# Patient Record
Sex: FEMALE | Race: WHITE | URBAN - METROPOLITAN AREA
[De-identification: names, ages, dates, MRNs, and addresses within clinical notes are randomized per-mention and may not be internally consistent; named-entity substitution may affect disease eponyms.]

---

## 2017-05-11 ENCOUNTER — HISTORICAL (OUTPATIENT)
Dept: ADMINISTRATIVE | Facility: HOSPITAL | Age: 57
End: 2017-05-11

## 2017-05-11 LAB
ABS NEUT (OLG): 1.52 X10(3)/MCL (ref 2.1–9.2)
ALBUMIN SERPL-MCNC: 3.7 GM/DL (ref 3.4–5)
ALBUMIN/GLOB SERPL: 1.2 {RATIO}
ALP SERPL-CCNC: 121 UNIT/L (ref 38–126)
ALT SERPL-CCNC: 35 UNIT/L (ref 12–78)
AST SERPL-CCNC: 34 UNIT/L (ref 15–37)
BASOPHILS # BLD AUTO: 0 X10(3)/MCL (ref 0–0.2)
BASOPHILS NFR BLD AUTO: 0.4 %
BILIRUB SERPL-MCNC: 0.4 MG/DL (ref 0.2–1)
BILIRUBIN DIRECT+TOT PNL SERPL-MCNC: 0.1 MG/DL (ref 0–0.2)
BILIRUBIN DIRECT+TOT PNL SERPL-MCNC: 0.3 MG/DL (ref 0–0.8)
BUN SERPL-MCNC: 21 MG/DL (ref 7–18)
CALCIUM SERPL-MCNC: 8.8 MG/DL (ref 8.5–10.1)
CHLORIDE SERPL-SCNC: 105 MMOL/L (ref 98–107)
CO2 SERPL-SCNC: 28 MMOL/L (ref 21–32)
CREAT SERPL-MCNC: 0.99 MG/DL (ref 0.55–1.02)
EOSINOPHIL # BLD AUTO: 0.1 X10(3)/MCL (ref 0–0.9)
EOSINOPHIL NFR BLD AUTO: 3.7 %
ERYTHROCYTE [DISTWIDTH] IN BLOOD BY AUTOMATED COUNT: 14.4 % (ref 11.5–17)
FERRITIN SERPL-MCNC: 66.3 NG/ML (ref 8–388)
GLOBULIN SER-MCNC: 3.2 GM/DL (ref 2.4–3.5)
GLUCOSE SERPL-MCNC: 95 MG/DL (ref 74–106)
HCT VFR BLD AUTO: 35.1 % (ref 37–47)
HGB BLD-MCNC: 11.4 GM/DL (ref 12–16)
IRON SATN MFR SERPL: 14.8 % (ref 20–50)
IRON SERPL-MCNC: 50 MCG/DL (ref 50–175)
LYMPHOCYTES # BLD AUTO: 0.8 X10(3)/MCL (ref 0.6–4.6)
LYMPHOCYTES NFR BLD AUTO: 30.2 %
MCH RBC QN AUTO: 27.7 PG (ref 27–31)
MCHC RBC AUTO-ENTMCNC: 32.5 GM/DL (ref 33–36)
MCV RBC AUTO: 85.4 FL (ref 80–94)
MONOCYTES # BLD AUTO: 0.2 X10(3)/MCL (ref 0.1–1.3)
MONOCYTES NFR BLD AUTO: 9 %
NEUTROPHILS # BLD AUTO: 1.5 X10(3)/MCL (ref 2.1–9.2)
NEUTROPHILS NFR BLD AUTO: 56.7 %
PLATELET # BLD AUTO: 73 X10(3)/MCL (ref 130–400)
PMV BLD AUTO: 10.8 FL (ref 9.4–12.4)
POTASSIUM SERPL-SCNC: 4.4 MMOL/L (ref 3.5–5.1)
PROT SERPL-MCNC: 6.9 GM/DL (ref 6.4–8.2)
RBC # BLD AUTO: 4.11 X10(6)/MCL (ref 4.2–5.4)
SODIUM SERPL-SCNC: 139 MMOL/L (ref 136–145)
TIBC SERPL-MCNC: 338 MCG/DL (ref 250–450)
TRANSFERRIN SERPL-MCNC: 257 MG/DL (ref 200–360)
WBC # SPEC AUTO: 2.7 X10(3)/MCL (ref 4.5–11.5)

## 2017-06-30 ENCOUNTER — HISTORICAL (OUTPATIENT)
Dept: ADMINISTRATIVE | Facility: HOSPITAL | Age: 57
End: 2017-06-30

## 2017-06-30 LAB
ABS NEUT (OLG): 1.38 X10(3)/MCL (ref 2.1–9.2)
ALBUMIN SERPL-MCNC: 4.1 GM/DL (ref 3.4–5)
ALBUMIN/GLOB SERPL: 1.3 {RATIO}
ALP SERPL-CCNC: 131 UNIT/L (ref 38–126)
ALT SERPL-CCNC: 43 UNIT/L (ref 12–78)
AST SERPL-CCNC: 27 UNIT/L (ref 15–37)
BASOPHILS # BLD AUTO: 0 X10(3)/MCL (ref 0–0.2)
BASOPHILS NFR BLD AUTO: 0.5 %
BILIRUB SERPL-MCNC: 0.5 MG/DL (ref 0.2–1)
BILIRUBIN DIRECT+TOT PNL SERPL-MCNC: 0.1 MG/DL (ref 0–0.2)
BILIRUBIN DIRECT+TOT PNL SERPL-MCNC: 0.4 MG/DL (ref 0–0.8)
BUN SERPL-MCNC: 25 MG/DL (ref 7–18)
CALCIUM SERPL-MCNC: 9.3 MG/DL (ref 8.5–10.1)
CHLORIDE SERPL-SCNC: 102 MMOL/L (ref 98–107)
CO2 SERPL-SCNC: 22 MMOL/L (ref 21–32)
CREAT SERPL-MCNC: 1.12 MG/DL (ref 0.55–1.02)
EOSINOPHIL # BLD AUTO: 0.1 X10(3)/MCL (ref 0–0.9)
EOSINOPHIL NFR BLD AUTO: 4.1 %
EOSINOPHIL NFR BLD MANUAL: 4 % (ref 0–8)
ERYTHROCYTE [DISTWIDTH] IN BLOOD BY AUTOMATED COUNT: 14.7 % (ref 11.5–17)
FERRITIN SERPL-MCNC: 503.6 NG/ML (ref 8–388)
GLOBULIN SER-MCNC: 3.2 GM/DL (ref 2.4–3.5)
GLUCOSE SERPL-MCNC: 258 MG/DL (ref 74–106)
HCT VFR BLD AUTO: 39 % (ref 37–47)
HGB BLD-MCNC: 13 GM/DL (ref 12–16)
IRON SATN MFR SERPL: 26.2 % (ref 20–50)
IRON SERPL-MCNC: 84 MCG/DL (ref 50–175)
LYMPHOCYTES # BLD AUTO: 0.5 X10(3)/MCL (ref 0.6–4.6)
LYMPHOCYTES NFR BLD AUTO: 24.2 %
LYMPHOCYTES NFR BLD MANUAL: 2 %
LYMPHOCYTES NFR BLD MANUAL: 30 % (ref 13–40)
MCH RBC QN AUTO: 29.7 PG (ref 27–31)
MCHC RBC AUTO-ENTMCNC: 33.3 GM/DL (ref 33–36)
MCV RBC AUTO: 89.2 FL (ref 80–94)
MONOCYTES # BLD AUTO: 0.2 X10(3)/MCL (ref 0.1–1.3)
MONOCYTES NFR BLD AUTO: 8.2 %
MONOCYTES NFR BLD MANUAL: 10 % (ref 2–11)
NEUTROPHILS # BLD AUTO: 1.4 X10(3)/MCL (ref 2.1–9.2)
NEUTROPHILS NFR BLD AUTO: 63 %
NEUTROPHILS NFR BLD MANUAL: 56 % (ref 47–80)
PLATELET # BLD AUTO: 50 X10(3)/MCL (ref 130–400)
PLATELET # BLD EST: ABNORMAL 10*3/UL
PMV BLD AUTO: 10.7 FL (ref 9.4–12.4)
POTASSIUM SERPL-SCNC: 4.7 MMOL/L (ref 3.5–5.1)
PROT SERPL-MCNC: 7.3 GM/DL (ref 6.4–8.2)
RBC # BLD AUTO: 4.37 X10(6)/MCL (ref 4.2–5.4)
RBC MORPH BLD: NORMAL
SODIUM SERPL-SCNC: 135 MMOL/L (ref 136–145)
TIBC SERPL-MCNC: 320 MCG/DL (ref 250–450)
TRANSFERRIN SERPL-MCNC: 265 MG/DL (ref 200–360)
WBC # SPEC AUTO: 2.2 X10(3)/MCL (ref 4.5–11.5)

## 2017-07-19 ENCOUNTER — HISTORICAL (OUTPATIENT)
Dept: ADMINISTRATIVE | Facility: HOSPITAL | Age: 57
End: 2017-07-19

## 2017-07-19 LAB
ERYTHROCYTE [DISTWIDTH] IN BLOOD BY AUTOMATED COUNT: 13.4 % (ref 11.5–14.5)
HCT VFR BLD AUTO: 37.5 % (ref 35–46)
HGB BLD-MCNC: 12.7 GM/DL (ref 12–16)
MCH RBC QN AUTO: 29.9 PG (ref 26–34)
MCHC RBC AUTO-ENTMCNC: 33.9 GM/DL (ref 31–37)
MCV RBC AUTO: 88.2 FL (ref 80–100)
PLATELET # BLD AUTO: 53 X10(3)/MCL (ref 130–400)
PMV BLD AUTO: 11.2 FL (ref 7.4–10.4)
RBC # BLD AUTO: 4.25 X10(6)/MCL (ref 4–5.2)
WBC # SPEC AUTO: 2.4 X10(3)/MCL (ref 4.5–11)

## 2017-07-24 ENCOUNTER — HISTORICAL (OUTPATIENT)
Dept: ADMINISTRATIVE | Facility: HOSPITAL | Age: 57
End: 2017-07-24

## 2017-07-24 LAB
ALBUMIN SERPL-MCNC: 4.3 GM/DL (ref 3.4–5)
ALBUMIN/GLOB SERPL: 1.3 RATIO (ref 1.1–2)
ALP SERPL-CCNC: 124 UNIT/L (ref 38–126)
ALT SERPL-CCNC: 48 UNIT/L (ref 12–78)
AST SERPL-CCNC: 33 UNIT/L (ref 15–37)
BILIRUB SERPL-MCNC: 0.3 MG/DL (ref 0.2–1)
BILIRUBIN DIRECT+TOT PNL SERPL-MCNC: 0.1 MG/DL (ref 0–0.5)
BILIRUBIN DIRECT+TOT PNL SERPL-MCNC: 0.2 MG/DL (ref 0–0.8)
BUN SERPL-MCNC: 22 MG/DL (ref 7–18)
CALCIUM SERPL-MCNC: 9.3 MG/DL (ref 8.5–10.1)
CHLORIDE SERPL-SCNC: 105 MMOL/L (ref 98–107)
CO2 SERPL-SCNC: 19 MMOL/L (ref 21–32)
CREAT SERPL-MCNC: 1.05 MG/DL (ref 0.55–1.02)
CRP SERPL HS-MCNC: 16.7 MG/L (ref 0–3)
ERYTHROCYTE [SEDIMENTATION RATE] IN BLOOD: 13 MM/HR (ref 0–20)
GLOBULIN SER-MCNC: 3.2 GM/DL (ref 2.4–3.5)
GLUCOSE SERPL-MCNC: 120 MG/DL (ref 74–106)
POTASSIUM SERPL-SCNC: 4.6 MMOL/L (ref 3.5–5.1)
PROT SERPL-MCNC: 7.5 GM/DL (ref 6.4–8.2)
SODIUM SERPL-SCNC: 139 MMOL/L (ref 136–145)

## 2017-07-25 ENCOUNTER — HISTORICAL (OUTPATIENT)
Dept: ADMINISTRATIVE | Facility: HOSPITAL | Age: 57
End: 2017-07-25

## 2017-07-25 LAB
ABS NEUT (OLG): 1.03 X10(3)/MCL (ref 2.1–9.2)
ABS NEUT (OLG): 1.11 X10(3)/MCL (ref 2.1–9.2)
ALBUMIN SERPL-MCNC: 3.6 GM/DL (ref 3.4–5)
ALBUMIN SERPL-MCNC: 3.8 GM/DL (ref 3.4–5)
ALBUMIN/GLOB SERPL: 1.1 RATIO (ref 1.1–2)
ALBUMIN/GLOB SERPL: 1.1 {RATIO}
ALP SERPL-CCNC: 104 UNIT/L (ref 38–126)
ALP SERPL-CCNC: 107 UNIT/L (ref 38–126)
ALT SERPL-CCNC: 37 UNIT/L (ref 12–78)
ALT SERPL-CCNC: 39 UNIT/L (ref 12–78)
AMPHET UR QL SCN: NORMAL
APPEARANCE, UA: CLEAR
APTT PPP: 29.8 SECOND(S) (ref 20.6–36)
AST SERPL-CCNC: 27 UNIT/L (ref 15–37)
AST SERPL-CCNC: 31 UNIT/L (ref 15–37)
BACTERIA SPEC CULT: ABNORMAL /HPF
BARBITURATE SCN PRESENT UR: NORMAL
BASOPHILS # BLD AUTO: 0 X10(3)/MCL (ref 0–0.2)
BASOPHILS NFR BLD AUTO: 0 %
BENZODIAZ UR QL SCN: NORMAL
BILIRUB SERPL-MCNC: 0.3 MG/DL (ref 0.2–1)
BILIRUB SERPL-MCNC: 0.4 MG/DL (ref 0.2–1)
BILIRUB UR QL STRIP: NEGATIVE
BILIRUBIN DIRECT+TOT PNL SERPL-MCNC: 0.1 MG/DL (ref 0–0.2)
BILIRUBIN DIRECT+TOT PNL SERPL-MCNC: 0.1 MG/DL (ref 0–0.5)
BILIRUBIN DIRECT+TOT PNL SERPL-MCNC: 0.2 MG/DL (ref 0–0.8)
BILIRUBIN DIRECT+TOT PNL SERPL-MCNC: 0.3 MG/DL (ref 0–0.8)
BUN SERPL-MCNC: 23 MG/DL (ref 7–18)
BUN SERPL-MCNC: 23 MG/DL (ref 7–18)
CALCIUM SERPL-MCNC: 8.9 MG/DL (ref 8.5–10.1)
CALCIUM SERPL-MCNC: 9.1 MG/DL (ref 8.5–10.1)
CANNABINOIDS UR QL SCN: NORMAL
CHLORIDE SERPL-SCNC: 105 MMOL/L (ref 98–107)
CHLORIDE SERPL-SCNC: 105 MMOL/L (ref 98–107)
CHOLEST SERPL-MCNC: 320 MG/DL (ref 0–200)
CHOLEST/HDLC SERPL: 10.3 {RATIO} (ref 0–4)
CK SERPL-CCNC: 88 UNIT/L (ref 26–192)
CO2 SERPL-SCNC: 25 MMOL/L (ref 21–32)
CO2 SERPL-SCNC: 25 MMOL/L (ref 21–32)
COCAINE UR QL SCN: NORMAL
COLOR UR: YELLOW
CREAT SERPL-MCNC: 0.98 MG/DL (ref 0.55–1.02)
CREAT SERPL-MCNC: 1.05 MG/DL (ref 0.55–1.02)
CRP SERPL HS-MCNC: 14.5 MG/L (ref 0–3)
EOSINOPHIL # BLD AUTO: 0.1 X10(3)/MCL (ref 0–0.9)
EOSINOPHIL # BLD AUTO: 0.1 X10(3)/MCL (ref 0–0.9)
EOSINOPHIL NFR BLD AUTO: 5 %
EOSINOPHIL NFR BLD AUTO: 6 %
ERYTHROCYTE [DISTWIDTH] IN BLOOD BY AUTOMATED COUNT: 13.2 % (ref 11.5–17)
ERYTHROCYTE [DISTWIDTH] IN BLOOD BY AUTOMATED COUNT: 13.4 % (ref 11.5–17)
ERYTHROCYTE [SEDIMENTATION RATE] IN BLOOD: 19 MM/HR (ref 0–20)
EST. AVERAGE GLUCOSE BLD GHB EST-MCNC: 137 MG/DL
GLOBULIN SER-MCNC: 3.3 GM/DL (ref 2.4–3.5)
GLOBULIN SER-MCNC: 3.4 GM/DL (ref 2.4–3.5)
GLUCOSE (UA): ABNORMAL
GLUCOSE SERPL-MCNC: 124 MG/DL (ref 74–106)
GLUCOSE SERPL-MCNC: 153 MG/DL (ref 74–106)
HBA1C MFR BLD: 6.4 % (ref 4.2–6.3)
HCT VFR BLD AUTO: 35.5 % (ref 37–47)
HCT VFR BLD AUTO: 36.2 % (ref 37–47)
HDLC SERPL-MCNC: 31 MG/DL (ref 35–60)
HGB BLD-MCNC: 11.9 GM/DL (ref 12–16)
HGB BLD-MCNC: 12.4 GM/DL (ref 12–16)
HGB UR QL STRIP: NEGATIVE
INR PPP: 1.05 (ref 0–1.27)
KETONES UR QL STRIP: NEGATIVE
LDLC SERPL CALC-MCNC: 111 MG/DL (ref 0–129)
LEUKOCYTE ESTERASE UR QL STRIP: NEGATIVE
LYMPHOCYTES # BLD AUTO: 0.7 X10(3)/MCL (ref 0.6–4.6)
LYMPHOCYTES # BLD AUTO: 0.7 X10(3)/MCL (ref 0.6–4.6)
LYMPHOCYTES NFR BLD AUTO: 34 %
LYMPHOCYTES NFR BLD AUTO: 35 %
MCH RBC QN AUTO: 30 PG (ref 27–31)
MCH RBC QN AUTO: 30.4 PG (ref 27–31)
MCHC RBC AUTO-ENTMCNC: 33.5 GM/DL (ref 33–36)
MCHC RBC AUTO-ENTMCNC: 34.3 GM/DL (ref 33–36)
MCV RBC AUTO: 88.7 FL (ref 80–94)
MCV RBC AUTO: 89.4 FL (ref 80–94)
MONOCYTES # BLD AUTO: 0.1 X10(3)/MCL (ref 0.1–1.3)
MONOCYTES # BLD AUTO: 0.2 X10(3)/MCL (ref 0.1–1.3)
MONOCYTES NFR BLD AUTO: 6 %
MONOCYTES NFR BLD AUTO: 8 %
NEUTROPHILS # BLD AUTO: 1.03 X10(3)/MCL (ref 1.4–7.9)
NEUTROPHILS # BLD AUTO: 1.11 X10(3)/MCL (ref 2.1–9.2)
NEUTROPHILS NFR BLD AUTO: 51 %
NEUTROPHILS NFR BLD AUTO: 55 %
NITRITE UR QL STRIP: NEGATIVE
OPIATES UR QL SCN: NORMAL
PCP UR QL: NORMAL
PH UR STRIP.AUTO: 6.5 [PH] (ref 5–7.5)
PH UR STRIP: 6.5 [PH] (ref 5–9)
PLATELET # BLD AUTO: 48 X10(3)/MCL (ref 130–400)
PLATELET # BLD AUTO: 55 X10(3)/MCL (ref 130–400)
PMV BLD AUTO: 10.2 FL (ref 9.4–12.4)
PMV BLD AUTO: 10.8 FL (ref 9.4–12.4)
POTASSIUM SERPL-SCNC: 3.9 MMOL/L (ref 3.5–5.1)
POTASSIUM SERPL-SCNC: 4.2 MMOL/L (ref 3.5–5.1)
PROT SERPL-MCNC: 6.9 GM/DL (ref 6.4–8.2)
PROT SERPL-MCNC: 7.2 GM/DL (ref 6.4–8.2)
PROT UR QL STRIP: NEGATIVE
PROTHROMBIN TIME: 13.5 SECOND(S) (ref 12.1–14.2)
RBC # BLD AUTO: 3.97 X10(6)/MCL (ref 4.2–5.4)
RBC # BLD AUTO: 4.08 X10(6)/MCL (ref 4.2–5.4)
RBC #/AREA URNS HPF: ABNORMAL /[HPF]
SODIUM SERPL-SCNC: 139 MMOL/L (ref 136–145)
SODIUM SERPL-SCNC: 142 MMOL/L (ref 136–145)
SP GR FLD REFRACTOMETRY: 1.01 (ref 1–1.03)
SP GR UR STRIP: 1.01 (ref 1–1.03)
SQUAMOUS EPITHELIAL, UA: ABNORMAL
TRIGL SERPL-MCNC: 892 MG/DL (ref 30–150)
TROPONIN I SERPL-MCNC: <0.02 NG/ML (ref 0.02–0.49)
UROBILINOGEN UR STRIP-ACNC: 1
VLDLC SERPL CALC-MCNC: 178 MG/DL
WBC # SPEC AUTO: 2 X10(3)/MCL (ref 4.5–11.5)
WBC # SPEC AUTO: 2 X10(3)/MCL (ref 4.5–11.5)
WBC #/AREA URNS HPF: 6 /HPF (ref 0–3)

## 2017-11-29 ENCOUNTER — HISTORICAL (OUTPATIENT)
Dept: ADMINISTRATIVE | Facility: HOSPITAL | Age: 57
End: 2017-11-29

## 2017-11-29 LAB
ALBUMIN SERPL-MCNC: 3.9 GM/DL (ref 3.4–5)
ALBUMIN/GLOB SERPL: 1.1 {RATIO}
ALP SERPL-CCNC: 106 UNIT/L (ref 38–126)
ALT SERPL-CCNC: 41 UNIT/L (ref 12–78)
AST SERPL-CCNC: 32 UNIT/L (ref 15–37)
BILIRUB SERPL-MCNC: 0.4 MG/DL (ref 0.2–1)
BILIRUBIN DIRECT+TOT PNL SERPL-MCNC: 0.1 MG/DL (ref 0–0.2)
BILIRUBIN DIRECT+TOT PNL SERPL-MCNC: 0.3 MG/DL (ref 0–0.8)
BUN SERPL-MCNC: 25 MG/DL (ref 7–18)
CALCIUM SERPL-MCNC: 9.1 MG/DL (ref 8.5–10.1)
CHLORIDE SERPL-SCNC: 104 MMOL/L (ref 98–107)
CO2 SERPL-SCNC: 22 MMOL/L (ref 21–32)
CREAT SERPL-MCNC: 1.09 MG/DL (ref 0.55–1.02)
CRP SERPL HS-MCNC: 14.4 MG/L (ref 0–3)
ERYTHROCYTE [SEDIMENTATION RATE] IN BLOOD: 19 MM/HR (ref 0–20)
GLOBULIN SER-MCNC: 3.6 GM/DL (ref 2.4–3.5)
GLUCOSE SERPL-MCNC: 118 MG/DL (ref 74–106)
POTASSIUM SERPL-SCNC: 4.6 MMOL/L (ref 3.5–5.1)
PROT SERPL-MCNC: 7.5 GM/DL (ref 6.4–8.2)
SODIUM SERPL-SCNC: 136 MMOL/L (ref 136–145)

## 2017-12-29 ENCOUNTER — HISTORICAL (OUTPATIENT)
Dept: ADMINISTRATIVE | Facility: HOSPITAL | Age: 57
End: 2017-12-29

## 2017-12-29 LAB
ABS NEUT (OLG): 1.73 X10(3)/MCL (ref 2.1–9.2)
ALBUMIN SERPL-MCNC: 3.7 GM/DL (ref 3.4–5)
ALBUMIN/GLOB SERPL: 1.1 {RATIO}
ALP SERPL-CCNC: 105 UNIT/L (ref 38–126)
ALT SERPL-CCNC: 38 UNIT/L (ref 12–78)
AST SERPL-CCNC: 31 UNIT/L (ref 15–37)
BILIRUB SERPL-MCNC: 0.3 MG/DL (ref 0.2–1)
BILIRUBIN DIRECT+TOT PNL SERPL-MCNC: 0.1 MG/DL (ref 0–0.2)
BILIRUBIN DIRECT+TOT PNL SERPL-MCNC: 0.2 MG/DL (ref 0–0.8)
BUN SERPL-MCNC: 25 MG/DL (ref 7–18)
CALCIUM SERPL-MCNC: 8.5 MG/DL (ref 8.5–10.1)
CHLORIDE SERPL-SCNC: 102 MMOL/L (ref 98–107)
CO2 SERPL-SCNC: 21 MMOL/L (ref 21–32)
CREAT SERPL-MCNC: 1.09 MG/DL (ref 0.55–1.02)
EOSINOPHIL # BLD AUTO: 0.1 X10(3)/MCL (ref 0–0.9)
EOSINOPHIL NFR BLD AUTO: 3.3 %
ERYTHROCYTE [DISTWIDTH] IN BLOOD BY AUTOMATED COUNT: 13.3 % (ref 11.5–17)
FERRITIN SERPL-MCNC: 213.3 NG/ML (ref 8–388)
GLOBULIN SER-MCNC: 3.4 GM/DL (ref 2.4–3.5)
GLUCOSE SERPL-MCNC: 201 MG/DL (ref 74–106)
HCT VFR BLD AUTO: 38 % (ref 37–47)
HGB BLD-MCNC: 12.7 GM/DL (ref 12–16)
IRON SATN MFR SERPL: 18.3 % (ref 20–50)
IRON SERPL-MCNC: 58 MCG/DL (ref 50–175)
LYMPHOCYTES # BLD AUTO: 0.7 X10(3)/MCL (ref 0.6–4.6)
LYMPHOCYTES NFR BLD AUTO: 25.5 %
MCH RBC QN AUTO: 29.5 PG (ref 27–31)
MCHC RBC AUTO-ENTMCNC: 33.4 GM/DL (ref 33–36)
MCV RBC AUTO: 88.4 FL (ref 80–94)
MONOCYTES # BLD AUTO: 0.2 X10(3)/MCL (ref 0.1–1.3)
MONOCYTES NFR BLD AUTO: 7.4 %
NEUTROPHILS # BLD AUTO: 1.7 X10(3)/MCL (ref 2.1–9.2)
NEUTROPHILS NFR BLD AUTO: 63.8 %
PLATELET # BLD AUTO: 59 X10(3)/MCL (ref 130–400)
PMV BLD AUTO: 10.1 FL (ref 9.4–12.4)
POTASSIUM SERPL-SCNC: 4.3 MMOL/L (ref 3.5–5.1)
PROT SERPL-MCNC: 7.1 GM/DL (ref 6.4–8.2)
RBC # BLD AUTO: 4.3 X10(6)/MCL (ref 4.2–5.4)
SODIUM SERPL-SCNC: 138 MMOL/L (ref 136–145)
TIBC SERPL-MCNC: 317 MCG/DL (ref 250–450)
TRANSFERRIN SERPL-MCNC: 262 MG/DL (ref 200–360)
WBC # SPEC AUTO: 2.7 X10(3)/MCL (ref 4.5–11.5)

## 2018-02-20 ENCOUNTER — HISTORICAL (OUTPATIENT)
Dept: ADMINISTRATIVE | Facility: HOSPITAL | Age: 58
End: 2018-02-20

## 2018-02-20 LAB
ALBUMIN SERPL-MCNC: 3.9 GM/DL (ref 3.4–5)
ALBUMIN/GLOB SERPL: 1 RATIO (ref 1–2)
ALP SERPL-CCNC: 102 UNIT/L (ref 45–117)
ALT SERPL-CCNC: 47 UNIT/L (ref 12–78)
AST SERPL-CCNC: 48 UNIT/L (ref 15–37)
BILIRUB SERPL-MCNC: 0.4 MG/DL (ref 0.2–1)
BILIRUBIN DIRECT+TOT PNL SERPL-MCNC: 0.1 MG/DL
BILIRUBIN DIRECT+TOT PNL SERPL-MCNC: 0.3 MG/DL
BUN SERPL-MCNC: 25 MG/DL (ref 7–18)
CALCIUM SERPL-MCNC: 9.3 MG/DL (ref 8.5–10.1)
CHLORIDE SERPL-SCNC: 99 MMOL/L (ref 98–107)
CO2 SERPL-SCNC: 25 MMOL/L (ref 21–32)
CREAT SERPL-MCNC: 1.1 MG/DL (ref 0.6–1.3)
ERYTHROCYTE [DISTWIDTH] IN BLOOD BY AUTOMATED COUNT: 13 % (ref 11.5–14.5)
GLOBULIN SER-MCNC: 4 GM/ML (ref 2.3–3.5)
GLUCOSE SERPL-MCNC: 230 MG/DL (ref 74–106)
HCT VFR BLD AUTO: 36.6 % (ref 35–46)
HGB BLD-MCNC: 12.5 GM/DL (ref 12–16)
MCH RBC QN AUTO: 29 PG (ref 26–34)
MCHC RBC AUTO-ENTMCNC: 34.2 GM/DL (ref 31–37)
MCV RBC AUTO: 84.9 FL (ref 80–100)
PLATELET # BLD AUTO: 65 X10(3)/MCL (ref 130–400)
PMV BLD AUTO: 10.3 FL (ref 7.4–10.4)
POTASSIUM SERPL-SCNC: 4.1 MMOL/L (ref 3.5–5.1)
PROT SERPL-MCNC: 7.9 GM/DL (ref 6.4–8.2)
RBC # BLD AUTO: 4.31 X10(6)/MCL (ref 4–5.2)
SODIUM SERPL-SCNC: 135 MMOL/L (ref 136–145)
WBC # SPEC AUTO: 2.5 X10(3)/MCL (ref 4.5–11)

## 2018-02-23 ENCOUNTER — HISTORICAL (OUTPATIENT)
Dept: ADMINISTRATIVE | Facility: HOSPITAL | Age: 58
End: 2018-02-23

## 2018-02-23 LAB
ABS NEUT (OLG): 1.23 X10(3)/MCL (ref 2.1–9.2)
ALBUMIN SERPL-MCNC: 3.9 GM/DL (ref 3.4–5)
ALBUMIN/GLOB SERPL: 1.2 RATIO (ref 1.1–2)
ALP SERPL-CCNC: 104 UNIT/L (ref 38–126)
ALT SERPL-CCNC: 41 UNIT/L (ref 12–78)
AST SERPL-CCNC: 33 UNIT/L (ref 15–37)
BASOPHILS # BLD AUTO: 0 X10(3)/MCL (ref 0–0.2)
BASOPHILS NFR BLD AUTO: 0 %
BILIRUB SERPL-MCNC: 0.5 MG/DL (ref 0.2–1)
BILIRUBIN DIRECT+TOT PNL SERPL-MCNC: 0.1 MG/DL (ref 0–0.5)
BILIRUBIN DIRECT+TOT PNL SERPL-MCNC: 0.4 MG/DL (ref 0–0.8)
BUN SERPL-MCNC: 27 MG/DL (ref 7–18)
CALCIUM SERPL-MCNC: 9.3 MG/DL (ref 8.5–10.1)
CHLORIDE SERPL-SCNC: 101 MMOL/L (ref 98–107)
CO2 SERPL-SCNC: 29 MMOL/L (ref 21–32)
CREAT SERPL-MCNC: 1.08 MG/DL (ref 0.55–1.02)
CRP SERPL HS-MCNC: 17 MG/L (ref 0–3)
EOSINOPHIL # BLD AUTO: 0.1 X10(3)/MCL (ref 0–0.9)
EOSINOPHIL NFR BLD AUTO: 4 %
ERYTHROCYTE [DISTWIDTH] IN BLOOD BY AUTOMATED COUNT: 13.3 % (ref 11.5–17)
ERYTHROCYTE [SEDIMENTATION RATE] IN BLOOD: 18 MM/HR (ref 0–20)
GLOBULIN SER-MCNC: 3.3 GM/DL (ref 2.4–3.5)
GLUCOSE SERPL-MCNC: 271 MG/DL (ref 74–106)
HCT VFR BLD AUTO: 35.2 % (ref 37–47)
HGB BLD-MCNC: 11.6 GM/DL (ref 12–16)
LYMPHOCYTES # BLD AUTO: 0.6 X10(3)/MCL (ref 0.6–4.6)
LYMPHOCYTES NFR BLD AUTO: 30 %
MCH RBC QN AUTO: 28.5 PG (ref 27–31)
MCHC RBC AUTO-ENTMCNC: 33 GM/DL (ref 33–36)
MCV RBC AUTO: 86.5 FL (ref 80–94)
MONOCYTES # BLD AUTO: 0.1 X10(3)/MCL (ref 0.1–1.3)
MONOCYTES NFR BLD AUTO: 7 %
NEUTROPHILS # BLD AUTO: 1.23 X10(3)/MCL (ref 2.1–9.2)
NEUTROPHILS NFR BLD AUTO: 58 %
PLATELET # BLD AUTO: 53 X10(3)/MCL (ref 130–400)
PMV BLD AUTO: 11.3 FL (ref 9.4–12.4)
POTASSIUM SERPL-SCNC: 4.6 MMOL/L (ref 3.5–5.1)
PROT SERPL-MCNC: 7.2 GM/DL (ref 6.4–8.2)
RBC # BLD AUTO: 4.07 X10(6)/MCL (ref 4.2–5.4)
SODIUM SERPL-SCNC: 136 MMOL/L (ref 136–145)
WBC # SPEC AUTO: 2.1 X10(3)/MCL (ref 4.5–11.5)

## 2018-06-22 ENCOUNTER — HISTORICAL (OUTPATIENT)
Dept: ADMINISTRATIVE | Facility: HOSPITAL | Age: 58
End: 2018-06-22

## 2018-06-22 LAB
ABS NEUT (OLG): 2.05 X10(3)/MCL (ref 2.1–9.2)
ALBUMIN SERPL-MCNC: 4 GM/DL (ref 3.4–5)
ALBUMIN/GLOB SERPL: 1.1 {RATIO}
ALP SERPL-CCNC: 94 UNIT/L (ref 38–126)
ALT SERPL-CCNC: 37 UNIT/L (ref 12–78)
AST SERPL-CCNC: 31 UNIT/L (ref 15–37)
BASOPHILS # BLD AUTO: 0 X10(3)/MCL (ref 0–0.2)
BASOPHILS NFR BLD AUTO: 0.3 %
BILIRUB SERPL-MCNC: 0.4 MG/DL (ref 0.2–1)
BILIRUBIN DIRECT+TOT PNL SERPL-MCNC: 0.1 MG/DL (ref 0–0.2)
BILIRUBIN DIRECT+TOT PNL SERPL-MCNC: 0.3 MG/DL (ref 0–0.8)
BUN SERPL-MCNC: 25 MG/DL (ref 7–18)
CALCIUM SERPL-MCNC: 9.7 MG/DL (ref 8.5–10.1)
CHLORIDE SERPL-SCNC: 107 MMOL/L (ref 98–107)
CO2 SERPL-SCNC: 25 MMOL/L (ref 21–32)
CREAT SERPL-MCNC: 1.14 MG/DL (ref 0.55–1.02)
EOSINOPHIL # BLD AUTO: 0.2 X10(3)/MCL (ref 0–0.9)
EOSINOPHIL NFR BLD AUTO: 5.5 %
ERYTHROCYTE [DISTWIDTH] IN BLOOD BY AUTOMATED COUNT: 14 % (ref 11.5–17)
FERRITIN SERPL-MCNC: 146.2 NG/ML (ref 8–388)
GLOBULIN SER-MCNC: 3.6 GM/DL (ref 2.4–3.5)
GLUCOSE SERPL-MCNC: 119 MG/DL (ref 74–106)
HCT VFR BLD AUTO: 38.5 % (ref 37–47)
HGB BLD-MCNC: 12.7 GM/DL (ref 12–16)
IRON SATN MFR SERPL: 14.3 % (ref 20–50)
IRON SERPL-MCNC: 58 MCG/DL (ref 50–175)
LYMPHOCYTES # BLD AUTO: 0.9 X10(3)/MCL (ref 0.6–4.6)
LYMPHOCYTES NFR BLD AUTO: 27.4 %
MCH RBC QN AUTO: 28.2 PG (ref 27–31)
MCHC RBC AUTO-ENTMCNC: 33 GM/DL (ref 33–36)
MCV RBC AUTO: 85.4 FL (ref 80–94)
MONOCYTES # BLD AUTO: 0.2 X10(3)/MCL (ref 0.1–1.3)
MONOCYTES NFR BLD AUTO: 7 %
NEUTROPHILS # BLD AUTO: 2 X10(3)/MCL (ref 2.1–9.2)
NEUTROPHILS NFR BLD AUTO: 59.8 %
PLATELET # BLD AUTO: 67 X10(3)/MCL (ref 130–400)
PMV BLD AUTO: 10.5 FL (ref 9.4–12.4)
POTASSIUM SERPL-SCNC: 4.8 MMOL/L (ref 3.5–5.1)
PROT SERPL-MCNC: 7.6 GM/DL (ref 6.4–8.2)
RBC # BLD AUTO: 4.51 X10(6)/MCL (ref 4.2–5.4)
SODIUM SERPL-SCNC: 139 MMOL/L (ref 136–145)
TIBC SERPL-MCNC: 406 MCG/DL (ref 250–450)
TRANSFERRIN SERPL-MCNC: 316 MG/DL (ref 200–360)
VIT B12 SERPL-MCNC: 363 PG/ML (ref 193–986)
WBC # SPEC AUTO: 3.4 X10(3)/MCL (ref 4.5–11.5)

## 2018-08-03 ENCOUNTER — HISTORICAL (OUTPATIENT)
Dept: ADMINISTRATIVE | Facility: HOSPITAL | Age: 58
End: 2018-08-03

## 2018-09-05 ENCOUNTER — HISTORICAL (OUTPATIENT)
Dept: ADMINISTRATIVE | Facility: HOSPITAL | Age: 58
End: 2018-09-05

## 2018-09-05 LAB
ABS NEUT (OLG): 1.44 X10(3)/MCL (ref 2.1–9.2)
ALBUMIN SERPL-MCNC: 4 GM/DL (ref 3.4–5)
ALBUMIN/GLOB SERPL: 1 RATIO (ref 1–2)
ALP SERPL-CCNC: 95 UNIT/L (ref 45–117)
ALT SERPL-CCNC: 38 UNIT/L (ref 12–78)
AMPHET UR QL SCN: NEGATIVE
APPEARANCE, UA: CLEAR
AST SERPL-CCNC: 37 UNIT/L (ref 15–37)
BACTERIA #/AREA URNS AUTO: ABNORMAL /[HPF]
BARBITURATE SCN PRESENT UR: NEGATIVE
BASOPHILS # BLD AUTO: 0.01 X10(3)/MCL
BASOPHILS NFR BLD AUTO: 0 %
BENZODIAZ UR QL SCN: NEGATIVE
BILIRUB SERPL-MCNC: 0.4 MG/DL (ref 0.2–1)
BILIRUB UR QL STRIP: NEGATIVE
BILIRUBIN DIRECT+TOT PNL SERPL-MCNC: 0.1 MG/DL
BILIRUBIN DIRECT+TOT PNL SERPL-MCNC: 0.3 MG/DL
BUN SERPL-MCNC: 35 MG/DL (ref 7–18)
CALCIUM SERPL-MCNC: 9.3 MG/DL (ref 8.5–10.1)
CANNABINOIDS UR QL SCN: NEGATIVE
CHLORIDE SERPL-SCNC: 100 MMOL/L (ref 98–107)
CO2 SERPL-SCNC: 26 MMOL/L (ref 21–32)
COCAINE UR QL SCN: NEGATIVE
COLOR UR: COLORLESS
CREAT SERPL-MCNC: 1.3 MG/DL (ref 0.6–1.3)
EOSINOPHIL # BLD AUTO: 0.17 X10(3)/MCL
EOSINOPHIL NFR BLD AUTO: 6 %
ERYTHROCYTE [DISTWIDTH] IN BLOOD BY AUTOMATED COUNT: 13.2 % (ref 11.5–14.5)
GLOBULIN SER-MCNC: 3.7 GM/ML (ref 2.3–3.5)
GLUCOSE (UA): >1000 MG/DL
GLUCOSE SERPL-MCNC: 162 MG/DL (ref 74–106)
HCT VFR BLD AUTO: 36.6 % (ref 35–46)
HGB BLD-MCNC: 12.3 GM/DL (ref 12–16)
HGB UR QL STRIP: NEGATIVE
HYALINE CASTS #/AREA URNS LPF: ABNORMAL /[LPF]
IMM GRANULOCYTES # BLD AUTO: 0.01 10*3/UL
IMM GRANULOCYTES NFR BLD AUTO: 0 %
KETONES UR QL STRIP: NEGATIVE
LEUKOCYTE ESTERASE UR QL STRIP: 500 LEU/UL
LYMPHOCYTES # BLD AUTO: 0.89 X10(3)/MCL
LYMPHOCYTES NFR BLD AUTO: 32 % (ref 13–40)
MCH RBC QN AUTO: 28.3 PG (ref 26–34)
MCHC RBC AUTO-ENTMCNC: 33.6 GM/DL (ref 31–37)
MCV RBC AUTO: 84.1 FL (ref 80–100)
MONOCYTES # BLD AUTO: 0.25 X10(3)/MCL
MONOCYTES NFR BLD AUTO: 9 % (ref 4–12)
NEUTROPHILS # BLD AUTO: 1.44 X10(3)/MCL
NEUTROPHILS NFR BLD AUTO: 52 X10(3)/MCL
NITRITE UR QL STRIP: NEGATIVE
OPIATES UR QL SCN: NEGATIVE
PCP UR QL: NEGATIVE
PH UR STRIP.AUTO: 6 [PH] (ref 5–8)
PH UR STRIP: 6 [PH] (ref 4.5–8)
PLATELET # BLD AUTO: 68 X10(3)/MCL (ref 130–400)
PMV BLD AUTO: 11.2 FL (ref 7.4–10.4)
POC TROPONIN: 0.01 NG/ML (ref 0–0.08)
POTASSIUM SERPL-SCNC: 4.2 MMOL/L (ref 3.5–5.1)
PROT SERPL-MCNC: 7.7 GM/DL (ref 6.4–8.2)
PROT UR QL STRIP: NEGATIVE
RBC # BLD AUTO: 4.35 X10(6)/MCL (ref 4–5.2)
RBC #/AREA URNS AUTO: ABNORMAL /[HPF]
SODIUM SERPL-SCNC: 134 MMOL/L (ref 136–145)
SP GR UR STRIP: 1 (ref 1–1.03)
SQUAMOUS #/AREA URNS LPF: ABNORMAL /[LPF]
TEMPERATURE, URINE (OHS): 25 DEGC (ref 20–25)
UROBILINOGEN UR STRIP-ACNC: NORMAL
WBC # SPEC AUTO: 2.8 X10(3)/MCL (ref 4.5–11)
WBC #/AREA URNS AUTO: ABNORMAL /HPF

## 2018-11-07 ENCOUNTER — HISTORICAL (OUTPATIENT)
Dept: ADMINISTRATIVE | Facility: HOSPITAL | Age: 58
End: 2018-11-07

## 2018-11-07 LAB
ABS NEUT (OLG): 2.06 X10(3)/MCL (ref 2.1–9.2)
ALBUMIN SERPL-MCNC: 3.6 GM/DL (ref 3.4–5)
ALBUMIN/GLOB SERPL: 1.2 RATIO (ref 1.1–2)
ALP SERPL-CCNC: 77 UNIT/L (ref 38–126)
ALT SERPL-CCNC: 37 UNIT/L (ref 12–78)
AMPHET UR QL SCN: NORMAL
APPEARANCE, UA: CLEAR
AST SERPL-CCNC: 41 UNIT/L (ref 15–37)
BACTERIA SPEC CULT: ABNORMAL /HPF
BARBITURATE SCN PRESENT UR: NORMAL
BENZODIAZ UR QL SCN: NORMAL
BILIRUB SERPL-MCNC: 0.3 MG/DL (ref 0.2–1)
BILIRUB UR QL STRIP: NEGATIVE
BILIRUBIN DIRECT+TOT PNL SERPL-MCNC: 0.1 MG/DL (ref 0–0.5)
BILIRUBIN DIRECT+TOT PNL SERPL-MCNC: 0.2 MG/DL (ref 0–0.8)
BUN SERPL-MCNC: 24 MG/DL (ref 7–18)
CALCIUM SERPL-MCNC: 9 MG/DL (ref 8.5–10.1)
CANNABINOIDS UR QL SCN: NORMAL
CHLORIDE SERPL-SCNC: 106 MMOL/L (ref 98–107)
CO2 SERPL-SCNC: 23 MMOL/L (ref 21–32)
COCAINE UR QL SCN: NORMAL
COLOR UR: YELLOW
CREAT SERPL-MCNC: 1.12 MG/DL (ref 0.55–1.02)
EOSINOPHIL # BLD AUTO: 0.1 X10(3)/MCL (ref 0–0.9)
EOSINOPHIL NFR BLD AUTO: 5 %
ERYTHROCYTE [DISTWIDTH] IN BLOOD BY AUTOMATED COUNT: 13.8 % (ref 11.5–17)
GLOBULIN SER-MCNC: 3.1 GM/DL (ref 2.4–3.5)
GLUCOSE (UA): ABNORMAL
GLUCOSE SERPL-MCNC: 146 MG/DL (ref 74–106)
HCT VFR BLD AUTO: 37 % (ref 37–47)
HGB BLD-MCNC: 11.7 GM/DL (ref 12–16)
HGB UR QL STRIP: NEGATIVE
KETONES UR QL STRIP: NEGATIVE
LACTATE SERPL-SCNC: 1.1 MMOL/L (ref 0.4–2)
LEUKOCYTE ESTERASE UR QL STRIP: NEGATIVE
LYMPHOCYTES # BLD AUTO: 0.3 X10(3)/MCL (ref 0.6–4.6)
LYMPHOCYTES NFR BLD AUTO: 11 %
MCH RBC QN AUTO: 27.9 PG (ref 27–31)
MCHC RBC AUTO-ENTMCNC: 31.6 GM/DL (ref 33–36)
MCV RBC AUTO: 88.1 FL (ref 80–94)
MONOCYTES # BLD AUTO: 0.2 X10(3)/MCL (ref 0.1–1.3)
MONOCYTES NFR BLD AUTO: 8 %
NEUTROPHILS # BLD AUTO: 2.06 X10(3)/MCL (ref 2.1–9.2)
NEUTROPHILS NFR BLD AUTO: 76 %
NITRITE UR QL STRIP: NEGATIVE
OPIATES UR QL SCN: NORMAL
PCP UR QL: NORMAL
PH UR STRIP.AUTO: 6.5 [PH] (ref 5–7.5)
PH UR STRIP: 6.5 [PH] (ref 5–9)
PLATELET # BLD AUTO: 56 X10(3)/MCL (ref 130–400)
PMV BLD AUTO: 12.1 FL (ref 9.4–12.4)
POTASSIUM SERPL-SCNC: 4.7 MMOL/L (ref 3.5–5.1)
PROT SERPL-MCNC: 6.7 GM/DL (ref 6.4–8.2)
PROT UR QL STRIP: NEGATIVE
RBC # BLD AUTO: 4.2 X10(6)/MCL (ref 4.2–5.4)
RBC #/AREA URNS HPF: ABNORMAL /[HPF]
SODIUM SERPL-SCNC: 137 MMOL/L (ref 136–145)
SP GR FLD REFRACTOMETRY: 1.02 (ref 1–1.03)
SP GR UR STRIP: 1.02 (ref 1–1.03)
SQUAMOUS EPITHELIAL, UA: ABNORMAL
TROPONIN I SERPL-MCNC: <0.02 NG/ML (ref 0.02–0.49)
TSH SERPL-ACNC: 2.71 MIU/L (ref 0.36–3.74)
UROBILINOGEN UR STRIP-ACNC: 0.2
WBC # SPEC AUTO: 2.7 X10(3)/MCL (ref 4.5–11.5)
WBC #/AREA URNS HPF: ABNORMAL /[HPF]

## 2018-11-13 LAB
FINAL CULTURE: NORMAL
FINAL CULTURE: NORMAL

## 2018-12-04 ENCOUNTER — HISTORICAL (OUTPATIENT)
Dept: ADMINISTRATIVE | Facility: HOSPITAL | Age: 58
End: 2018-12-04

## 2018-12-04 LAB
ABS NEUT (OLG): 1.07 X10(3)/MCL (ref 2.1–9.2)
ALBUMIN SERPL-MCNC: 3.6 GM/DL (ref 3.4–5)
ALBUMIN/GLOB SERPL: 1 RATIO (ref 1–2)
ALP SERPL-CCNC: 82 UNIT/L (ref 45–117)
ALT SERPL-CCNC: 44 UNIT/L (ref 12–78)
ANISOCYTOSIS BLD QL SMEAR: NORMAL
APPEARANCE, UA: CLEAR
AST SERPL-CCNC: 34 UNIT/L (ref 15–37)
BACTERIA #/AREA URNS AUTO: ABNORMAL /[HPF]
BASOPHILS NFR BLD MANUAL: 0 %
BILIRUB SERPL-MCNC: 0.3 MG/DL (ref 0.2–1)
BILIRUB UR QL STRIP: NEGATIVE
BILIRUBIN DIRECT+TOT PNL SERPL-MCNC: 0.1 MG/DL
BILIRUBIN DIRECT+TOT PNL SERPL-MCNC: 0.2 MG/DL
BUN SERPL-MCNC: 20 MG/DL (ref 7–18)
CALCIUM SERPL-MCNC: 9.2 MG/DL (ref 8.5–10.1)
CHLORIDE SERPL-SCNC: 107 MMOL/L (ref 98–107)
CO2 SERPL-SCNC: 27 MMOL/L (ref 21–32)
COLOR UR: ABNORMAL
CREAT SERPL-MCNC: 0.9 MG/DL (ref 0.6–1.3)
EOSINOPHIL NFR BLD MANUAL: 4 %
ERYTHROCYTE [DISTWIDTH] IN BLOOD BY AUTOMATED COUNT: 13.2 % (ref 11.5–14.5)
GLOBULIN SER-MCNC: 3.5 GM/ML (ref 2.3–3.5)
GLUCOSE (UA): 70 MG/DL
GLUCOSE SERPL-MCNC: 155 MG/DL (ref 74–106)
GRANULOCYTES NFR BLD MANUAL: 56 % (ref 43–75)
HCT VFR BLD AUTO: 34.6 % (ref 35–46)
HGB BLD-MCNC: 11.4 GM/DL (ref 12–16)
HGB UR QL STRIP: NEGATIVE
HYALINE CASTS #/AREA URNS LPF: ABNORMAL /[LPF]
KETONES UR QL STRIP: NEGATIVE
LEUKOCYTE ESTERASE UR QL STRIP: NEGATIVE
LYMPHOCYTES NFR BLD MANUAL: 36 % (ref 20.5–51.1)
MCH RBC QN AUTO: 27.7 PG (ref 26–34)
MCHC RBC AUTO-ENTMCNC: 32.9 GM/DL (ref 31–37)
MCV RBC AUTO: 84.2 FL (ref 80–100)
MONOCYTES NFR BLD MANUAL: 4 % (ref 2–9)
NITRITE UR QL STRIP: NEGATIVE
PH UR STRIP: 6 [PH] (ref 4.5–8)
PLATELET # BLD AUTO: 55 X10(3)/MCL (ref 130–400)
PLATELET # BLD EST: NORMAL 10*3/UL
PMV BLD AUTO: 11.1 FL (ref 7.4–10.4)
POTASSIUM SERPL-SCNC: 3.9 MMOL/L (ref 3.5–5.1)
PROT SERPL-MCNC: 7.1 GM/DL (ref 6.4–8.2)
PROT UR QL STRIP: NEGATIVE
RBC # BLD AUTO: 4.11 X10(6)/MCL (ref 4–5.2)
RBC #/AREA URNS AUTO: ABNORMAL /[HPF]
RBC MORPH BLD: NORMAL
SODIUM SERPL-SCNC: 140 MMOL/L (ref 136–145)
SP GR UR STRIP: 1 (ref 1–1.03)
SQUAMOUS #/AREA URNS LPF: ABNORMAL /[LPF]
UROBILINOGEN UR STRIP-ACNC: NORMAL
WBC # SPEC AUTO: 2 X10(3)/MCL (ref 4.5–11)
WBC #/AREA URNS AUTO: ABNORMAL /HPF

## 2019-01-07 ENCOUNTER — HISTORICAL (OUTPATIENT)
Dept: ADMINISTRATIVE | Facility: HOSPITAL | Age: 59
End: 2019-01-07

## 2019-01-07 LAB
APPEARANCE, UA: CLEAR
BACTERIA #/AREA URNS AUTO: ABNORMAL /[HPF]
BILIRUB UR QL STRIP: NEGATIVE
COLOR UR: ABNORMAL
GLUCOSE (UA): 70 MG/DL
HGB UR QL STRIP: NEGATIVE
HYALINE CASTS #/AREA URNS LPF: ABNORMAL /[LPF]
KETONES UR QL STRIP: NEGATIVE
LEUKOCYTE ESTERASE UR QL STRIP: 500 LEU/UL
NITRITE UR QL STRIP: NEGATIVE
PH UR STRIP: 7 [PH] (ref 4.5–8)
PROT UR QL STRIP: NEGATIVE
RBC #/AREA URNS AUTO: ABNORMAL /[HPF]
SP GR UR STRIP: 1 (ref 1–1.03)
SQUAMOUS #/AREA URNS LPF: ABNORMAL /[LPF]
UROBILINOGEN UR STRIP-ACNC: NORMAL
WBC #/AREA URNS AUTO: ABNORMAL /HPF

## 2019-06-07 ENCOUNTER — HISTORICAL (OUTPATIENT)
Dept: ADMINISTRATIVE | Facility: HOSPITAL | Age: 59
End: 2019-06-07

## 2019-06-07 LAB
ALBUMIN SERPL-MCNC: 3.7 GM/DL (ref 3.4–5)
ALBUMIN/GLOB SERPL: 1.1 RATIO (ref 1.1–2)
ALP SERPL-CCNC: 82 UNIT/L (ref 38–126)
ALT SERPL-CCNC: 34 UNIT/L (ref 12–78)
AST SERPL-CCNC: 33 UNIT/L (ref 15–37)
BILIRUB SERPL-MCNC: 0.3 MG/DL (ref 0.2–1)
BILIRUBIN DIRECT+TOT PNL SERPL-MCNC: 0.1 MG/DL (ref 0–0.5)
BILIRUBIN DIRECT+TOT PNL SERPL-MCNC: 0.2 MG/DL (ref 0–0.8)
BUN SERPL-MCNC: 26 MG/DL (ref 7–18)
CALCIUM SERPL-MCNC: 9 MG/DL (ref 8.5–10.1)
CHLORIDE SERPL-SCNC: 108 MMOL/L (ref 98–107)
CO2 SERPL-SCNC: 25 MMOL/L (ref 21–32)
CREAT SERPL-MCNC: 1.12 MG/DL (ref 0.55–1.02)
CRP SERPL HS-MCNC: 8.38 MG/L (ref 0–3)
ERYTHROCYTE [DISTWIDTH] IN BLOOD BY AUTOMATED COUNT: 13.8 % (ref 11.5–17)
ERYTHROCYTE [SEDIMENTATION RATE] IN BLOOD: 12 MM/HR (ref 0–20)
GLOBULIN SER-MCNC: 3.4 GM/DL (ref 2.4–3.5)
GLUCOSE SERPL-MCNC: 190 MG/DL (ref 74–106)
HCT VFR BLD AUTO: 39.2 % (ref 37–47)
HGB BLD-MCNC: 12.4 GM/DL (ref 12–16)
MCH RBC QN AUTO: 26.9 PG (ref 27–31)
MCHC RBC AUTO-ENTMCNC: 31.6 GM/DL (ref 33–36)
MCV RBC AUTO: 85 FL (ref 80–94)
PLATELET # BLD AUTO: 55 X10(3)/MCL (ref 130–400)
PMV BLD AUTO: 11.9 FL (ref 9.4–12.4)
POTASSIUM SERPL-SCNC: 4.4 MMOL/L (ref 3.5–5.1)
PROT SERPL-MCNC: 7.1 GM/DL (ref 6.4–8.2)
RBC # BLD AUTO: 4.61 X10(6)/MCL (ref 4.2–5.4)
SODIUM SERPL-SCNC: 140 MMOL/L (ref 136–145)
WBC # SPEC AUTO: 2.9 X10(3)/MCL (ref 4.5–11.5)

## 2019-06-24 ENCOUNTER — HISTORICAL (OUTPATIENT)
Dept: ADMINISTRATIVE | Facility: HOSPITAL | Age: 59
End: 2019-06-24

## 2019-08-19 ENCOUNTER — HISTORICAL (OUTPATIENT)
Dept: ADMINISTRATIVE | Facility: HOSPITAL | Age: 59
End: 2019-08-19

## 2019-08-19 LAB
ABS NEUT (OLG): 1.47 X10(3)/MCL (ref 2.1–9.2)
ALBUMIN SERPL-MCNC: 3.6 GM/DL (ref 3.4–5)
ALBUMIN/GLOB SERPL: 0.9 RATIO (ref 1.1–2)
ALP SERPL-CCNC: 110 UNIT/L (ref 45–117)
ALT SERPL-CCNC: 34 UNIT/L (ref 12–78)
AMYLASE SERPL-CCNC: 65 UNIT/L (ref 25–115)
APPEARANCE, UA: CLEAR
AST SERPL-CCNC: 29 UNIT/L (ref 15–37)
BACTERIA #/AREA URNS AUTO: ABNORMAL /[HPF]
BASOPHILS # BLD AUTO: 0.01 X10(3)/MCL
BASOPHILS NFR BLD AUTO: 0 %
BILIRUB SERPL-MCNC: 0.3 MG/DL (ref 0.2–1)
BILIRUB UR QL STRIP: NEGATIVE
BILIRUBIN DIRECT+TOT PNL SERPL-MCNC: <0.1 MG/DL
BILIRUBIN DIRECT+TOT PNL SERPL-MCNC: ABNORMAL MG/DL
BUN SERPL-MCNC: 21 MG/DL (ref 7–18)
CALCIUM SERPL-MCNC: 9.2 MG/DL (ref 8.5–10.1)
CHLORIDE SERPL-SCNC: 108 MMOL/L (ref 98–107)
CK MB SERPL-MCNC: <1 NG/ML (ref 1–3.6)
CK SERPL-CCNC: 68 UNIT/L (ref 26–192)
CO2 SERPL-SCNC: 27 MMOL/L (ref 21–32)
COLOR UR: ABNORMAL
CREAT SERPL-MCNC: 1.1 MG/DL (ref 0.6–1.3)
EOSINOPHIL # BLD AUTO: 0.09 10*3/UL
EOSINOPHIL NFR BLD AUTO: 4 %
ERYTHROCYTE [DISTWIDTH] IN BLOOD BY AUTOMATED COUNT: 13.2 % (ref 11.5–14.5)
GLOBULIN SER-MCNC: 3.9 GM/ML (ref 2.3–3.5)
GLUCOSE (UA): >1000 MG/DL
GLUCOSE SERPL-MCNC: 237 MG/DL (ref 74–106)
HCT VFR BLD AUTO: 36.6 % (ref 35–46)
HGB BLD-MCNC: 11.7 GM/DL (ref 12–16)
HGB UR QL STRIP: NEGATIVE
HYALINE CASTS #/AREA URNS LPF: ABNORMAL /[LPF]
IMM GRANULOCYTES # BLD AUTO: 0.01 10*3/UL
IMM GRANULOCYTES NFR BLD AUTO: 0 %
KETONES UR QL STRIP: NEGATIVE
LEUKOCYTE ESTERASE UR QL STRIP: 250 LEU/UL
LIPASE SERPL-CCNC: 282 UNIT/L (ref 73–393)
LYMPHOCYTES # BLD AUTO: 0.57 X10(3)/MCL
LYMPHOCYTES NFR BLD AUTO: 25 % (ref 13–40)
MCH RBC QN AUTO: 26.9 PG (ref 26–34)
MCHC RBC AUTO-ENTMCNC: 32 GM/DL (ref 31–37)
MCV RBC AUTO: 84.1 FL (ref 80–100)
MONOCYTES # BLD AUTO: 0.13 X10(3)/MCL
MONOCYTES NFR BLD AUTO: 6 % (ref 0–24)
NEUTROPHILS # BLD AUTO: 1.47 X10(3)/MCL
NEUTROPHILS NFR BLD AUTO: 65 X10(3)/MCL
NITRITE UR QL STRIP: NEGATIVE
PH UR STRIP: 6.5 [PH] (ref 4.5–8)
PLATELET # BLD AUTO: 62 X10(3)/MCL (ref 130–400)
PMV BLD AUTO: 12.1 FL (ref 7.4–10.4)
POTASSIUM SERPL-SCNC: 4.4 MMOL/L (ref 3.5–5.1)
PROT SERPL-MCNC: 7.5 GM/DL (ref 6.4–8.2)
PROT UR QL STRIP: NEGATIVE
RBC # BLD AUTO: 4.35 X10(6)/MCL (ref 4–5.2)
RBC #/AREA URNS AUTO: ABNORMAL /[HPF]
SODIUM SERPL-SCNC: 142 MMOL/L (ref 136–145)
SP GR UR STRIP: 1.01 (ref 1–1.03)
SQUAMOUS #/AREA URNS LPF: ABNORMAL /[LPF]
TROPONIN I SERPL-MCNC: <0.015 NG/ML (ref 0–0.05)
UROBILINOGEN UR STRIP-ACNC: NORMAL
WBC # SPEC AUTO: 2.3 X10(3)/MCL (ref 4.5–11)
WBC #/AREA URNS AUTO: ABNORMAL /HPF

## 2019-09-06 ENCOUNTER — HISTORICAL (OUTPATIENT)
Dept: ADMINISTRATIVE | Facility: HOSPITAL | Age: 59
End: 2019-09-06

## 2019-09-06 LAB
ABS NEUT (OLG): 1.63 X10(3)/MCL (ref 2.1–9.2)
ALBUMIN SERPL-MCNC: 3.9 GM/DL (ref 3.4–5)
ALBUMIN/GLOB SERPL: 1.3 {RATIO}
ALP SERPL-CCNC: 107 UNIT/L (ref 38–126)
ALT SERPL-CCNC: 36 UNIT/L (ref 12–78)
ANISOCYTOSIS BLD QL SMEAR: 1
AST SERPL-CCNC: 36 UNIT/L (ref 15–37)
BASOPHILS # BLD AUTO: 0 X10(3)/MCL (ref 0–0.2)
BASOPHILS NFR BLD AUTO: 0 %
BILIRUB SERPL-MCNC: 0.7 MG/DL (ref 0.2–1)
BILIRUBIN DIRECT+TOT PNL SERPL-MCNC: 0.1 MG/DL (ref 0–0.2)
BILIRUBIN DIRECT+TOT PNL SERPL-MCNC: 0.6 MG/DL (ref 0–0.8)
BUN SERPL-MCNC: 25 MG/DL (ref 7–18)
CALCIUM SERPL-MCNC: 9.6 MG/DL (ref 8.5–10.1)
CHLORIDE SERPL-SCNC: 104 MMOL/L (ref 98–107)
CO2 SERPL-SCNC: 23 MMOL/L (ref 21–32)
CREAT SERPL-MCNC: 1.16 MG/DL (ref 0.55–1.02)
DACRYOCYTES BLD QL SMEAR: 1 /MCL (ref 0–1)
EOSINOPHIL # BLD AUTO: 0.1 X10(3)/MCL (ref 0–0.9)
EOSINOPHIL NFR BLD AUTO: 3.9 %
EOSINOPHIL NFR BLD MANUAL: 3 % (ref 0–8)
ERYTHROCYTE [DISTWIDTH] IN BLOOD BY AUTOMATED COUNT: 13.5 % (ref 11.5–17)
FERRITIN SERPL-MCNC: 27.4 NG/ML (ref 8–388)
GLOBULIN SER-MCNC: 3 GM/DL (ref 2.4–3.5)
GLUCOSE SERPL-MCNC: 401 MG/DL (ref 74–106)
HCT VFR BLD AUTO: 38 % (ref 37–47)
HGB BLD-MCNC: 11.9 GM/DL (ref 12–16)
HYPOCHROMIA BLD QL SMEAR: 0
IRON SATN MFR SERPL: 14.3 % (ref 20–50)
IRON SERPL-MCNC: 60 MCG/DL (ref 50–175)
LYMPHOCYTES # BLD AUTO: 0.4 X10(3)/MCL (ref 0.6–4.6)
LYMPHOCYTES NFR BLD AUTO: 18.2 %
LYMPHOCYTES NFR BLD MANUAL: 13 % (ref 13–40)
MACROCYTES BLD QL SMEAR: 0
MCH RBC QN AUTO: 26.7 PG (ref 27–31)
MCHC RBC AUTO-ENTMCNC: 31.3 GM/DL (ref 33–36)
MCV RBC AUTO: 85.2 FL (ref 80–94)
MICROCYTES BLD QL SMEAR: 1
MONOCYTES # BLD AUTO: 0.2 X10(3)/MCL (ref 0.1–1.3)
MONOCYTES NFR BLD AUTO: 7.4 %
MONOCYTES NFR BLD MANUAL: 3 % (ref 2–11)
NEUTROPHILS # BLD AUTO: 1.6 X10(3)/MCL (ref 2.1–9.2)
NEUTROPHILS NFR BLD AUTO: 70.5 %
NEUTROPHILS NFR BLD MANUAL: 81 % (ref 47–80)
PLATELET # BLD AUTO: 62 X10(3)/MCL (ref 130–400)
PLATELET # BLD EST: ABNORMAL 10*3/UL
PMV BLD AUTO: 11.1 FL (ref 9.4–12.4)
POIKILOCYTOSIS BLD QL SMEAR: 1
POLYCHROMASIA BLD QL SMEAR: 0
POTASSIUM SERPL-SCNC: 4.5 MMOL/L (ref 3.5–5.1)
PROT SERPL-MCNC: 6.9 GM/DL (ref 6.4–8.2)
RBC # BLD AUTO: 4.46 X10(6)/MCL (ref 4.2–5.4)
SCHISTOCYTES BLD QL AUTO: 0
SODIUM SERPL-SCNC: 137 MMOL/L (ref 136–145)
SPHEROCYTES BLD QL SMEAR: 0
TARGETS BLD QL SMEAR: 0
TIBC SERPL-MCNC: 420 MCG/DL (ref 250–450)
TRANSFERRIN SERPL-MCNC: 342 MG/DL (ref 200–360)
VIT B12 SERPL-MCNC: 644 PG/ML (ref 193–986)
WBC # SPEC AUTO: 2.3 X10(3)/MCL (ref 4.5–11.5)

## 2019-09-24 ENCOUNTER — HISTORICAL (OUTPATIENT)
Dept: ADMINISTRATIVE | Facility: HOSPITAL | Age: 59
End: 2019-09-24

## 2019-09-24 LAB
ALBUMIN SERPL-MCNC: 3.9 GM/DL (ref 3.4–5)
ALBUMIN/GLOB SERPL: 1.2 RATIO (ref 1.1–2)
ALP SERPL-CCNC: 104 UNIT/L (ref 38–126)
ALT SERPL-CCNC: 33 UNIT/L (ref 12–78)
AST SERPL-CCNC: 31 UNIT/L (ref 15–37)
BILIRUB SERPL-MCNC: 0.4 MG/DL (ref 0.2–1)
BILIRUBIN DIRECT+TOT PNL SERPL-MCNC: 0.1 MG/DL (ref 0–0.5)
BILIRUBIN DIRECT+TOT PNL SERPL-MCNC: 0.3 MG/DL (ref 0–0.8)
BUN SERPL-MCNC: 21 MG/DL (ref 7–18)
CALCIUM SERPL-MCNC: 8.9 MG/DL (ref 8.5–10.1)
CHLORIDE SERPL-SCNC: 104 MMOL/L (ref 98–107)
CO2 SERPL-SCNC: 25 MMOL/L (ref 21–32)
CREAT SERPL-MCNC: 1.23 MG/DL (ref 0.55–1.02)
CRP SERPL HS-MCNC: 13.4 MG/L (ref 0–3)
ERYTHROCYTE [DISTWIDTH] IN BLOOD BY AUTOMATED COUNT: 14.2 % (ref 11.5–17)
ERYTHROCYTE [SEDIMENTATION RATE] IN BLOOD: 14 MM/HR (ref 0–20)
GLOBULIN SER-MCNC: 3.2 GM/DL (ref 2.4–3.5)
GLUCOSE SERPL-MCNC: 213 MG/DL (ref 74–106)
HCT VFR BLD AUTO: 37.2 % (ref 37–47)
HGB BLD-MCNC: 11.5 GM/DL (ref 12–16)
MCH RBC QN AUTO: 26.6 PG (ref 27–31)
MCHC RBC AUTO-ENTMCNC: 30.9 GM/DL (ref 33–36)
MCV RBC AUTO: 85.9 FL (ref 80–94)
PLATELET # BLD AUTO: 63 X10(3)/MCL (ref 130–400)
PMV BLD AUTO: 12.1 FL (ref 9.4–12.4)
POTASSIUM SERPL-SCNC: 3.8 MMOL/L (ref 3.5–5.1)
PROT SERPL-MCNC: 7.1 GM/DL (ref 6.4–8.2)
RBC # BLD AUTO: 4.33 X10(6)/MCL (ref 4.2–5.4)
SODIUM SERPL-SCNC: 139 MMOL/L (ref 136–145)
WBC # SPEC AUTO: 2.5 X10(3)/MCL (ref 4.5–11.5)

## 2019-10-11 ENCOUNTER — HISTORICAL (OUTPATIENT)
Dept: ADMINISTRATIVE | Facility: HOSPITAL | Age: 59
End: 2019-10-11

## 2019-11-07 ENCOUNTER — HISTORICAL (OUTPATIENT)
Dept: ADMINISTRATIVE | Facility: HOSPITAL | Age: 59
End: 2019-11-07

## 2019-11-09 LAB — FINAL CULTURE: NORMAL

## 2019-11-11 ENCOUNTER — HISTORICAL (OUTPATIENT)
Dept: ADMINISTRATIVE | Facility: HOSPITAL | Age: 59
End: 2019-11-11

## 2019-11-29 ENCOUNTER — HISTORICAL (OUTPATIENT)
Dept: ADMINISTRATIVE | Facility: HOSPITAL | Age: 59
End: 2019-11-29

## 2019-12-10 ENCOUNTER — HISTORICAL (OUTPATIENT)
Dept: ADMINISTRATIVE | Facility: HOSPITAL | Age: 59
End: 2019-12-10

## 2019-12-10 LAB
ABS NEUT (OLG): 2.57 X10(3)/MCL (ref 2.1–9.2)
BASOPHILS # BLD AUTO: 0 X10(3)/MCL (ref 0–0.2)
BASOPHILS NFR BLD AUTO: 0.3 %
EOSINOPHIL # BLD AUTO: 0.1 X10(3)/MCL (ref 0–0.9)
EOSINOPHIL NFR BLD AUTO: 3.5 %
ERYTHROCYTE [DISTWIDTH] IN BLOOD BY AUTOMATED COUNT: 13.1 % (ref 11.5–17)
FERRITIN SERPL-MCNC: 280.7 NG/ML (ref 8–388)
HCT VFR BLD AUTO: 40.2 % (ref 37–47)
HGB BLD-MCNC: 13.2 GM/DL (ref 12–16)
IRON SATN MFR SERPL: 17.9 % (ref 20–50)
IRON SERPL-MCNC: 58 MCG/DL (ref 50–175)
LYMPHOCYTES # BLD AUTO: 0.8 X10(3)/MCL (ref 0.6–4.6)
LYMPHOCYTES NFR BLD AUTO: 20.6 %
MCH RBC QN AUTO: 28.9 PG (ref 27–31)
MCHC RBC AUTO-ENTMCNC: 32.8 GM/DL (ref 33–36)
MCV RBC AUTO: 88.2 FL (ref 80–94)
MONOCYTES # BLD AUTO: 0.2 X10(3)/MCL (ref 0.1–1.3)
MONOCYTES NFR BLD AUTO: 6.4 %
NEUTROPHILS # BLD AUTO: 2.6 X10(3)/MCL (ref 2.1–9.2)
NEUTROPHILS NFR BLD AUTO: 68.9 %
PLATELET # BLD AUTO: 65 X10(3)/MCL (ref 130–400)
PMV BLD AUTO: 10.6 FL (ref 9.4–12.4)
RBC # BLD AUTO: 4.56 X10(6)/MCL (ref 4.2–5.4)
TIBC SERPL-MCNC: 324 MCG/DL (ref 250–450)
TRANSFERRIN SERPL-MCNC: 247 MG/DL (ref 200–360)
WBC # SPEC AUTO: 3.7 X10(3)/MCL (ref 4.5–11.5)

## 2019-12-14 ENCOUNTER — HISTORICAL (OUTPATIENT)
Dept: ADMINISTRATIVE | Facility: HOSPITAL | Age: 59
End: 2019-12-14

## 2019-12-16 LAB — FINAL CULTURE: NORMAL

## 2019-12-19 ENCOUNTER — HISTORICAL (OUTPATIENT)
Dept: ADMINISTRATIVE | Facility: HOSPITAL | Age: 59
End: 2019-12-19

## 2019-12-19 LAB
BUN SERPL-MCNC: 23 MG/DL (ref 7–18)
CALCIUM SERPL-MCNC: 9.6 MG/DL (ref 8.5–10.1)
CHLORIDE SERPL-SCNC: 103 MMOL/L (ref 98–107)
CHOLEST SERPL-MCNC: 195 MG/DL
CHOLEST/HDLC SERPL: 5.9 {RATIO} (ref 0–4.4)
CO2 SERPL-SCNC: 25 MMOL/L (ref 21–32)
CREAT SERPL-MCNC: 1 MG/DL (ref 0.6–1.3)
CREAT/UREA NIT SERPL: 23
EST. AVERAGE GLUCOSE BLD GHB EST-MCNC: 258 MG/DL
GLUCOSE SERPL-MCNC: 422 MG/DL (ref 74–106)
HBA1C MFR BLD: 10.6 % (ref 4.2–6.3)
HDLC SERPL-MCNC: 33 MG/DL (ref 40–59)
LDLC SERPL CALC-MCNC: ABNORMAL MG/DL
POTASSIUM SERPL-SCNC: 5 MMOL/L (ref 3.5–5.1)
SODIUM SERPL-SCNC: 138 MMOL/L (ref 136–145)
TRIGL SERPL-MCNC: 854 MG/DL
VLDLC SERPL CALC-MCNC: ABNORMAL MG/DL

## 2020-01-21 ENCOUNTER — HISTORICAL (OUTPATIENT)
Dept: ADMINISTRATIVE | Facility: HOSPITAL | Age: 60
End: 2020-01-21

## 2020-01-21 LAB
ABS NEUT (OLG): 2.51 X10(3)/MCL (ref 2.1–9.2)
ALBUMIN SERPL-MCNC: 3.5 GM/DL (ref 3.4–5)
ALBUMIN/GLOB SERPL: 1.2 RATIO (ref 1.1–2)
ALP SERPL-CCNC: 142 UNIT/L (ref 38–126)
ALT SERPL-CCNC: 59 UNIT/L (ref 12–78)
AST SERPL-CCNC: 54 UNIT/L (ref 15–37)
BASOPHILS # BLD AUTO: 0 X10(3)/MCL (ref 0–0.2)
BASOPHILS NFR BLD AUTO: 0 %
BILIRUB SERPL-MCNC: 0.5 MG/DL (ref 0.2–1)
BILIRUBIN DIRECT+TOT PNL SERPL-MCNC: 0.2 MG/DL (ref 0–0.5)
BILIRUBIN DIRECT+TOT PNL SERPL-MCNC: 0.3 MG/DL (ref 0–0.8)
BUN SERPL-MCNC: 24 MG/DL (ref 7–18)
CALCIUM SERPL-MCNC: 8.8 MG/DL (ref 8.5–10.1)
CHLORIDE SERPL-SCNC: 95 MMOL/L (ref 98–107)
CO2 SERPL-SCNC: 22 MMOL/L (ref 21–32)
CREAT SERPL-MCNC: 1.06 MG/DL (ref 0.55–1.02)
CRP SERPL HS-MCNC: 11.8 MG/L (ref 0–3)
EOSINOPHIL # BLD AUTO: 0.2 X10(3)/MCL (ref 0–0.9)
EOSINOPHIL NFR BLD AUTO: 4 %
ERYTHROCYTE [DISTWIDTH] IN BLOOD BY AUTOMATED COUNT: 13.2 % (ref 11.5–17)
ERYTHROCYTE [SEDIMENTATION RATE] IN BLOOD: 8 MM/HR (ref 0–20)
GLOBULIN SER-MCNC: 3 GM/DL (ref 2.4–3.5)
GLUCOSE SERPL-MCNC: 357 MG/DL (ref 74–106)
HCT VFR BLD AUTO: 39.9 % (ref 37–47)
HGB BLD-MCNC: 13.7 GM/DL (ref 12–16)
LYMPHOCYTES # BLD AUTO: 1.2 X10(3)/MCL (ref 0.6–4.6)
LYMPHOCYTES NFR BLD AUTO: 28 %
MCH RBC QN AUTO: 30 PG (ref 27–31)
MCHC RBC AUTO-ENTMCNC: 34.3 GM/DL (ref 33–36)
MCV RBC AUTO: 87.3 FL (ref 80–94)
MONOCYTES # BLD AUTO: 0.3 X10(3)/MCL (ref 0.1–1.3)
MONOCYTES NFR BLD AUTO: 7 %
NEUTROPHILS # BLD AUTO: 2.51 X10(3)/MCL (ref 2.1–9.2)
NEUTROPHILS NFR BLD AUTO: 60 %
PLATELET # BLD AUTO: 62 X10(3)/MCL (ref 130–400)
PMV BLD AUTO: 11.3 FL (ref 9.4–12.4)
POTASSIUM SERPL-SCNC: 4.5 MMOL/L (ref 3.5–5.1)
PROT SERPL-MCNC: 6.5 GM/DL (ref 6.4–8.2)
RBC # BLD AUTO: 4.57 X10(6)/MCL (ref 4.2–5.4)
SODIUM SERPL-SCNC: 131 MMOL/L (ref 136–145)
WBC # SPEC AUTO: 4.2 X10(3)/MCL (ref 4.5–11.5)

## 2020-03-13 ENCOUNTER — HISTORICAL (OUTPATIENT)
Dept: ADMINISTRATIVE | Facility: HOSPITAL | Age: 60
End: 2020-03-13

## 2020-03-13 LAB
ABS NEUT (OLG): 1.51 X10(3)/MCL (ref 2.1–9.2)
ALBUMIN SERPL-MCNC: 3.4 GM/DL (ref 3.4–5)
ALBUMIN/GLOB SERPL: 0.9 RATIO (ref 1.1–2)
ALP SERPL-CCNC: 150 UNIT/L (ref 45–117)
ALT SERPL-CCNC: 47 UNIT/L (ref 12–78)
APPEARANCE, UA: CLEAR
APTT PPP: 30 SECOND(S) (ref 23.3–37)
AST SERPL-CCNC: 46 UNIT/L (ref 15–37)
BACTERIA #/AREA URNS AUTO: ABNORMAL /HPF
BASOPHILS # BLD AUTO: 0 X10(3)/MCL (ref 0–0.2)
BASOPHILS NFR BLD AUTO: 0 %
BILIRUB SERPL-MCNC: 0.6 MG/DL (ref 0.2–1)
BILIRUB UR QL STRIP: NEGATIVE
BILIRUBIN DIRECT+TOT PNL SERPL-MCNC: 0.2 MG/DL (ref 0–0.2)
BILIRUBIN DIRECT+TOT PNL SERPL-MCNC: 0.4 MG/DL
BUN SERPL-MCNC: 30 MG/DL (ref 7–18)
CALCIUM SERPL-MCNC: 8.8 MG/DL (ref 8.5–10.1)
CHLORIDE SERPL-SCNC: 103 MMOL/L (ref 98–107)
CO2 SERPL-SCNC: 17 MMOL/L (ref 21–32)
COLOR UR: ABNORMAL
CREAT SERPL-MCNC: 0.9 MG/DL (ref 0.6–1.3)
EOSINOPHIL # BLD AUTO: 0.2 X10(3)/MCL (ref 0–0.9)
EOSINOPHIL NFR BLD AUTO: 7 %
ERYTHROCYTE [DISTWIDTH] IN BLOOD BY AUTOMATED COUNT: 12.7 % (ref 11.5–14.5)
GLOBULIN SER-MCNC: 3.8 GM/ML (ref 2.3–3.5)
GLUCOSE (UA): >1000 MG/DL
GLUCOSE SERPL-MCNC: 415 MG/DL (ref 74–106)
HCT VFR BLD AUTO: 38.5 % (ref 35–46)
HGB BLD-MCNC: 12.8 GM/DL (ref 12–16)
HGB UR QL STRIP: 0.03 MG/DL
HYALINE CASTS #/AREA URNS LPF: ABNORMAL /LPF
IMM GRANULOCYTES # BLD AUTO: 0.01 10*3/UL
IMM GRANULOCYTES NFR BLD AUTO: 0 %
INR PPP: 1.1 (ref 0.9–1.2)
KETONES UR QL STRIP: 20 MG/DL
LEUKOCYTE ESTERASE UR QL STRIP: 25 LEU/UL
LIPASE SERPL-CCNC: 327 UNIT/L (ref 73–393)
LYMPHOCYTES # BLD AUTO: 0.5 X10(3)/MCL (ref 0.6–4.6)
LYMPHOCYTES NFR BLD AUTO: 21 %
MAGNESIUM SERPL-MCNC: 2.2 MG/DL (ref 1.6–2.6)
MCH RBC QN AUTO: 29.9 PG (ref 26–34)
MCHC RBC AUTO-ENTMCNC: 33.2 GM/DL (ref 31–37)
MCV RBC AUTO: 90 FL (ref 80–100)
MONOCYTES # BLD AUTO: 0.1 X10(3)/MCL (ref 0.1–1.3)
MONOCYTES NFR BLD AUTO: 5 %
NEUTROPHILS # BLD AUTO: 1.51 X10(3)/MCL (ref 2.1–9.2)
NEUTROPHILS NFR BLD AUTO: 66 %
NITRITE UR QL STRIP: NEGATIVE
PH UR STRIP: 5.5 [PH] (ref 4.5–8)
PLATELET # BLD AUTO: 41 X10(3)/MCL (ref 130–400)
PMV BLD AUTO: 12.1 FL (ref 7.4–10.4)
POTASSIUM SERPL-SCNC: 3.7 MMOL/L (ref 3.5–5.1)
PROT SERPL-MCNC: 7.2 GM/DL (ref 6.4–8.2)
PROT UR QL STRIP: NEGATIVE
PROTHROMBIN TIME: 14.1 SECOND(S) (ref 11.9–14.4)
RBC # BLD AUTO: 4.28 X10(6)/MCL (ref 4–5.2)
RBC #/AREA URNS AUTO: ABNORMAL /HPF
SODIUM SERPL-SCNC: 134 MMOL/L (ref 136–145)
SP GR UR STRIP: 1.03 (ref 1–1.03)
SQUAMOUS #/AREA URNS LPF: ABNORMAL /LPF
TROPONIN I SERPL-MCNC: <0.015 NG/ML (ref 0–0.05)
UROBILINOGEN UR STRIP-ACNC: NORMAL
WBC # SPEC AUTO: 2.3 X10(3)/MCL (ref 4.5–11)
WBC #/AREA URNS AUTO: ABNORMAL /HPF

## 2020-03-21 ENCOUNTER — HISTORICAL (OUTPATIENT)
Dept: ADMINISTRATIVE | Facility: HOSPITAL | Age: 60
End: 2020-03-21

## 2020-03-21 LAB
ABS NEUT (OLG): 0.91 X10(3)/MCL (ref 2.1–9.2)
ALBUMIN SERPL-MCNC: 3.3 GM/DL (ref 3.4–5)
ALBUMIN/GLOB SERPL: 0.9 RATIO (ref 1.1–2)
ALP SERPL-CCNC: 140 UNIT/L (ref 38–126)
ALT SERPL-CCNC: 39 UNIT/L (ref 12–78)
ANISOCYTOSIS BLD QL SMEAR: SLIGHT
AST SERPL-CCNC: 40 UNIT/L (ref 15–37)
BASOPHILS NFR BLD MANUAL: 1 % (ref 0–2)
BILIRUB SERPL-MCNC: 0.3 MG/DL (ref 0.2–1)
BILIRUBIN DIRECT+TOT PNL SERPL-MCNC: 0.1 MG/DL (ref 0–0.5)
BILIRUBIN DIRECT+TOT PNL SERPL-MCNC: 0.2 MG/DL (ref 0–0.8)
BNP BLD-MCNC: 76 PG/ML (ref 0–100)
BUN SERPL-MCNC: 23 MG/DL (ref 7–18)
CALCIUM SERPL-MCNC: 9 MG/DL (ref 8.5–10.1)
CHLORIDE SERPL-SCNC: 107 MMOL/L (ref 98–107)
CO2 SERPL-SCNC: 26 MMOL/L (ref 21–32)
CREAT SERPL-MCNC: 0.9 MG/DL (ref 0.55–1.02)
D DIMER PPP IA.FEU-MCNC: 718 NG/ML FEU (ref 0–500)
EOSINOPHIL NFR BLD MANUAL: 8 % (ref 0–8)
ERYTHROCYTE [DISTWIDTH] IN BLOOD BY AUTOMATED COUNT: 12.8 % (ref 11.5–17)
ERYTHROCYTE [SEDIMENTATION RATE] IN BLOOD: 36 MM/HR (ref 0–20)
FLUAV AG UPPER RESP QL IA.RAPID: NEGATIVE
FLUBV AG UPPER RESP QL IA.RAPID: NEGATIVE
GLOBULIN SER-MCNC: 3.7 GM/DL (ref 2.4–3.5)
GLUCOSE SERPL-MCNC: 374 MG/DL (ref 74–106)
HCT VFR BLD AUTO: 36.8 % (ref 37–47)
HGB BLD-MCNC: 12 GM/DL (ref 12–16)
LACTATE SERPL-SCNC: 1 MMOL/L (ref 0.4–2)
LDH SERPL-CCNC: 284 UNIT/L (ref 84–246)
LYMPHOCYTES NFR BLD MANUAL: 22 % (ref 13–40)
MCH RBC QN AUTO: 29.3 PG (ref 27–31)
MCHC RBC AUTO-ENTMCNC: 32.6 GM/DL (ref 33–36)
MCV RBC AUTO: 90 FL (ref 80–94)
MONOCYTES NFR BLD MANUAL: 3 % (ref 2–11)
NEUTROPHILS NFR BLD MANUAL: 66 % (ref 47–80)
PLATELET # BLD AUTO: 59 X10(3)/MCL (ref 130–400)
PLATELET # BLD EST: ABNORMAL 10*3/UL
PMV BLD AUTO: 10.9 FL (ref 7.4–10.4)
POC TROPONIN: 0.02 NG/ML (ref 0–0.08)
POTASSIUM SERPL-SCNC: 4.1 MMOL/L (ref 3.5–5.1)
PROT SERPL-MCNC: 7 GM/DL (ref 6.4–8.2)
RBC # BLD AUTO: 4.09 X10(6)/MCL (ref 4.2–5.4)
RBC MORPH BLD: ABNORMAL
SODIUM SERPL-SCNC: 140 MMOL/L (ref 136–145)
WBC # SPEC AUTO: 1.7 X10(3)/MCL (ref 4.5–11.5)

## 2020-03-22 LAB
ABS NEUT (OLG): 1 X10(3)/MCL (ref 2.1–9.2)
BASOPHILS # BLD AUTO: 0 X10(3)/MCL (ref 0–0.2)
BASOPHILS NFR BLD AUTO: 0 %
BUN SERPL-MCNC: 16 MG/DL (ref 7–18)
CALCIUM SERPL-MCNC: 8.7 MG/DL (ref 8.5–10.1)
CHLORIDE SERPL-SCNC: 109 MMOL/L (ref 98–107)
CO2 SERPL-SCNC: 26 MMOL/L (ref 21–32)
CREAT SERPL-MCNC: 0.71 MG/DL (ref 0.55–1.02)
CREAT/UREA NIT SERPL: 22.5
EOSINOPHIL # BLD AUTO: 0.1 X10(3)/MCL (ref 0–0.9)
EOSINOPHIL NFR BLD AUTO: 6 %
ERYTHROCYTE [DISTWIDTH] IN BLOOD BY AUTOMATED COUNT: 13.1 % (ref 11.5–17)
GLUCOSE SERPL-MCNC: 209 MG/DL (ref 74–106)
HCT VFR BLD AUTO: 35.5 % (ref 37–47)
HGB BLD-MCNC: 11.1 GM/DL (ref 12–16)
IMM GRANULOCYTES # BLD AUTO: 0 10*3/UL
IMM GRANULOCYTES NFR BLD AUTO: 0 %
LYMPHOCYTES # BLD AUTO: 0.7 X10(3)/MCL (ref 0.6–4.6)
LYMPHOCYTES NFR BLD AUTO: 34 %
MCH RBC QN AUTO: 29 PG (ref 27–31)
MCHC RBC AUTO-ENTMCNC: 31.3 GM/DL (ref 33–36)
MCV RBC AUTO: 92.7 FL (ref 80–94)
MONOCYTES # BLD AUTO: 0.1 X10(3)/MCL (ref 0.1–1.3)
MONOCYTES NFR BLD AUTO: 7 %
NEUTROPHILS # BLD AUTO: 1 X10(3)/MCL (ref 2.1–9.2)
NEUTROPHILS NFR BLD AUTO: 52 %
PLATELET # BLD AUTO: 60 X10(3)/MCL (ref 130–400)
PMV BLD AUTO: 11.2 FL (ref 9.4–12.4)
POTASSIUM SERPL-SCNC: 3.9 MMOL/L (ref 3.5–5.1)
RBC # BLD AUTO: 3.83 X10(6)/MCL (ref 4.2–5.4)
SODIUM SERPL-SCNC: 141 MMOL/L (ref 136–145)
WBC # SPEC AUTO: 1.9 X10(3)/MCL (ref 4.5–11.5)

## 2020-03-23 LAB
ABS NEUT (OLG): 1.23 X10(3)/MCL (ref 2.1–9.2)
BASOPHILS # BLD AUTO: 0 X10(3)/MCL (ref 0–0.2)
BASOPHILS NFR BLD AUTO: 0 %
BUN SERPL-MCNC: 15 MG/DL (ref 7–18)
CALCIUM SERPL-MCNC: 8.8 MG/DL (ref 8.5–10.1)
CHLORIDE SERPL-SCNC: 111 MMOL/L (ref 98–107)
CO2 SERPL-SCNC: 22 MMOL/L (ref 21–32)
CREAT SERPL-MCNC: 0.68 MG/DL (ref 0.55–1.02)
CREAT/UREA NIT SERPL: 22.1
EOSINOPHIL # BLD AUTO: 0.1 X10(3)/MCL (ref 0–0.9)
EOSINOPHIL NFR BLD AUTO: 6 %
ERYTHROCYTE [DISTWIDTH] IN BLOOD BY AUTOMATED COUNT: 12.9 % (ref 11.5–17)
GLUCOSE SERPL-MCNC: 180 MG/DL (ref 74–106)
HCT VFR BLD AUTO: 38.4 % (ref 37–47)
HGB BLD-MCNC: 11.8 GM/DL (ref 12–16)
LYMPHOCYTES # BLD AUTO: 0.6 X10(3)/MCL (ref 0.6–4.6)
LYMPHOCYTES NFR BLD AUTO: 27 %
MCH RBC QN AUTO: 28.9 PG (ref 27–31)
MCHC RBC AUTO-ENTMCNC: 30.7 GM/DL (ref 33–36)
MCV RBC AUTO: 93.9 FL (ref 80–94)
MONOCYTES # BLD AUTO: 0.2 X10(3)/MCL (ref 0.1–1.3)
MONOCYTES NFR BLD AUTO: 8 %
NEUTROPHILS # BLD AUTO: 1.23 X10(3)/MCL (ref 2.1–9.2)
NEUTROPHILS NFR BLD AUTO: 59 %
PLATELET # BLD AUTO: 61 X10(3)/MCL (ref 130–400)
PMV BLD AUTO: 10.2 FL (ref 9.4–12.4)
POTASSIUM SERPL-SCNC: 4.2 MMOL/L (ref 3.5–5.1)
RBC # BLD AUTO: 4.09 X10(6)/MCL (ref 4.2–5.4)
SODIUM SERPL-SCNC: 141 MMOL/L (ref 136–145)
WBC # SPEC AUTO: 2.1 X10(3)/MCL (ref 4.5–11.5)

## 2020-03-24 LAB
ABS NEUT (OLG): 1.34 X10(3)/MCL (ref 2.1–9.2)
BASOPHILS # BLD AUTO: 0 X10(3)/MCL (ref 0–0.2)
BASOPHILS NFR BLD AUTO: 0 %
BUN SERPL-MCNC: 16 MG/DL (ref 7–18)
CALCIUM SERPL-MCNC: 9.3 MG/DL (ref 8.5–10.1)
CHLORIDE SERPL-SCNC: 111 MMOL/L (ref 98–107)
CO2 SERPL-SCNC: 26 MMOL/L (ref 21–32)
CREAT SERPL-MCNC: 0.72 MG/DL (ref 0.55–1.02)
CREAT/UREA NIT SERPL: 22.2
EOSINOPHIL # BLD AUTO: 0.2 X10(3)/MCL (ref 0–0.9)
EOSINOPHIL NFR BLD AUTO: 7 %
ERYTHROCYTE [DISTWIDTH] IN BLOOD BY AUTOMATED COUNT: 13 % (ref 11.5–17)
GLUCOSE SERPL-MCNC: 197 MG/DL (ref 74–106)
HCT VFR BLD AUTO: 39 % (ref 37–47)
HGB BLD-MCNC: 12.1 GM/DL (ref 12–16)
LYMPHOCYTES # BLD AUTO: 0.7 X10(3)/MCL (ref 0.6–4.6)
LYMPHOCYTES NFR BLD AUTO: 28 %
MCH RBC QN AUTO: 28.8 PG (ref 27–31)
MCHC RBC AUTO-ENTMCNC: 31 GM/DL (ref 33–36)
MCV RBC AUTO: 92.9 FL (ref 80–94)
MONOCYTES # BLD AUTO: 0.2 X10(3)/MCL (ref 0.1–1.3)
MONOCYTES NFR BLD AUTO: 9 %
NEUTROPHILS # BLD AUTO: 1.34 X10(3)/MCL (ref 2.1–9.2)
NEUTROPHILS NFR BLD AUTO: 55 %
PLATELET # BLD AUTO: 62 X10(3)/MCL (ref 130–400)
PMV BLD AUTO: 10.4 FL (ref 9.4–12.4)
POTASSIUM SERPL-SCNC: 4.4 MMOL/L (ref 3.5–5.1)
RBC # BLD AUTO: 4.2 X10(6)/MCL (ref 4.2–5.4)
SODIUM SERPL-SCNC: 144 MMOL/L (ref 136–145)
WBC # SPEC AUTO: 2.5 X10(3)/MCL (ref 4.5–11.5)

## 2020-03-26 LAB
FINAL CULTURE: NORMAL
FINAL CULTURE: NORMAL

## 2020-03-28 LAB
ABS NEUT (OLG): 2.63 X10(3)/MCL (ref 2.1–9.2)
BASOPHILS # BLD AUTO: 0 X10(3)/MCL (ref 0–0.2)
BASOPHILS NFR BLD AUTO: 0 %
EOSINOPHIL # BLD AUTO: 0.2 X10(3)/MCL (ref 0–0.9)
EOSINOPHIL NFR BLD AUTO: 6 %
ERYTHROCYTE [DISTWIDTH] IN BLOOD BY AUTOMATED COUNT: 12.7 % (ref 11.5–17)
HCT VFR BLD AUTO: 45.4 % (ref 37–47)
HGB BLD-MCNC: 14.2 GM/DL (ref 12–16)
LYMPHOCYTES # BLD AUTO: 0.8 X10(3)/MCL (ref 0.6–4.6)
LYMPHOCYTES NFR BLD AUTO: 20 %
MCH RBC QN AUTO: 28.5 PG (ref 27–31)
MCHC RBC AUTO-ENTMCNC: 31.3 GM/DL (ref 33–36)
MCV RBC AUTO: 91 FL (ref 80–94)
MONOCYTES # BLD AUTO: 0.3 X10(3)/MCL (ref 0.1–1.3)
MONOCYTES NFR BLD AUTO: 7 %
NEUTROPHILS # BLD AUTO: 2.63 X10(3)/MCL (ref 2.1–9.2)
NEUTROPHILS NFR BLD AUTO: 67 %
PLATELET # BLD AUTO: 94 X10(3)/MCL (ref 130–400)
PMV BLD AUTO: 10 FL (ref 9.4–12.4)
RBC # BLD AUTO: 4.99 X10(6)/MCL (ref 4.2–5.4)
WBC # SPEC AUTO: 3.9 X10(3)/MCL (ref 4.5–11.5)

## 2020-04-20 ENCOUNTER — HISTORICAL (OUTPATIENT)
Dept: ADMINISTRATIVE | Facility: HOSPITAL | Age: 60
End: 2020-04-20

## 2020-04-20 LAB
ABS NEUT (OLG): 2.03 X10(3)/MCL (ref 2.1–9.2)
ALBUMIN SERPL-MCNC: 3.8 GM/DL (ref 3.5–5)
ALBUMIN SERPL-MCNC: 3.8 GM/DL (ref 3.5–5)
ALBUMIN/GLOB SERPL: 1.3 RATIO (ref 1.1–2)
ALBUMIN/GLOB SERPL: 1.3 RATIO (ref 1.1–2)
ALP SERPL-CCNC: 115 UNIT/L (ref 40–150)
ALP SERPL-CCNC: 115 UNIT/L (ref 40–150)
ALT SERPL-CCNC: 45 UNIT/L (ref 0–55)
ALT SERPL-CCNC: 45 UNIT/L (ref 0–55)
AST SERPL-CCNC: 54 UNIT/L (ref 5–34)
AST SERPL-CCNC: 55 UNIT/L (ref 5–34)
BASOPHILS # BLD AUTO: 0 X10(3)/MCL (ref 0–0.2)
BASOPHILS NFR BLD AUTO: 0 %
BILIRUB SERPL-MCNC: 0.6 MG/DL
BILIRUB SERPL-MCNC: 0.6 MG/DL
BILIRUBIN DIRECT+TOT PNL SERPL-MCNC: 0.2 MG/DL (ref 0–0.5)
BILIRUBIN DIRECT+TOT PNL SERPL-MCNC: 0.3 MG/DL (ref 0–0.5)
BILIRUBIN DIRECT+TOT PNL SERPL-MCNC: 0.3 MG/DL (ref 0–0.8)
BILIRUBIN DIRECT+TOT PNL SERPL-MCNC: 0.4 MG/DL (ref 0–0.8)
BUN SERPL-MCNC: 30 MG/DL (ref 9.8–20.1)
BUN SERPL-MCNC: 30 MG/DL (ref 9.8–20.1)
CALCIUM SERPL-MCNC: 9 MG/DL (ref 8.4–10.2)
CALCIUM SERPL-MCNC: 9.2 MG/DL (ref 8.4–10.2)
CHLORIDE SERPL-SCNC: 104 MMOL/L (ref 98–107)
CHLORIDE SERPL-SCNC: 106 MMOL/L (ref 98–107)
CO2 SERPL-SCNC: 30 MMOL/L (ref 22–29)
CO2 SERPL-SCNC: 30 MMOL/L (ref 22–29)
CREAT SERPL-MCNC: 0.98 MG/DL (ref 0.55–1.02)
CREAT SERPL-MCNC: 1.08 MG/DL (ref 0.55–1.02)
CRP SERPL HS-MCNC: 3.47 MG/L (ref 0–5)
EOSINOPHIL # BLD AUTO: 0.2 X10(3)/MCL (ref 0–0.9)
EOSINOPHIL NFR BLD AUTO: 6 %
ERYTHROCYTE [DISTWIDTH] IN BLOOD BY AUTOMATED COUNT: 12.8 % (ref 11.5–17)
ERYTHROCYTE [SEDIMENTATION RATE] IN BLOOD: 7 MM/HR (ref 0–20)
FERRITIN SERPL-MCNC: 91.96 NG/ML (ref 4.63–204)
GLOBULIN SER-MCNC: 3 GM/DL (ref 2.4–3.5)
GLOBULIN SER-MCNC: 3 GM/DL (ref 2.4–3.5)
GLUCOSE SERPL-MCNC: 194 MG/DL (ref 74–100)
GLUCOSE SERPL-MCNC: 217 MG/DL (ref 74–100)
HCT VFR BLD AUTO: 41.6 % (ref 37–47)
HGB BLD-MCNC: 13.3 GM/DL (ref 12–16)
IRON SATN MFR SERPL: 27 %
IRON SERPL-MCNC: 87 UG/DL (ref 50–170)
LYMPHOCYTES # BLD AUTO: 0.7 X10(3)/MCL (ref 0.6–4.6)
LYMPHOCYTES NFR BLD AUTO: 22.2 %
MCH RBC QN AUTO: 29.2 PG (ref 27–31)
MCHC RBC AUTO-ENTMCNC: 32 GM/DL (ref 33–36)
MCV RBC AUTO: 91.4 FL (ref 80–94)
MONOCYTES # BLD AUTO: 0.2 X10(3)/MCL (ref 0.1–1.3)
MONOCYTES NFR BLD AUTO: 7.6 %
NEUTROPHILS # BLD AUTO: 2 X10(3)/MCL (ref 2.1–9.2)
NEUTROPHILS NFR BLD AUTO: 64.2 %
PLATELET # BLD AUTO: 43 X10(3)/MCL (ref 130–400)
PMV BLD AUTO: 11.1 FL (ref 9.4–12.4)
POTASSIUM SERPL-SCNC: 4.5 MMOL/L (ref 3.5–5.1)
POTASSIUM SERPL-SCNC: 5.1 MMOL/L (ref 3.5–5.1)
PROT SERPL-MCNC: 6.8 GM/DL (ref 6.4–8.3)
PROT SERPL-MCNC: 6.8 GM/DL (ref 6.4–8.3)
RBC # BLD AUTO: 4.55 X10(6)/MCL (ref 4.2–5.4)
SODIUM SERPL-SCNC: 141 MMOL/L (ref 136–145)
SODIUM SERPL-SCNC: 142 MMOL/L (ref 136–145)
TIBC SERPL-MCNC: 238 UG/DL (ref 70–310)
TIBC SERPL-MCNC: 325 UG/DL
TRANSFERRIN SERPL-MCNC: 280 MG/DL (ref 180–382)
WBC # SPEC AUTO: 3.2 X10(3)/MCL (ref 4.5–11.5)

## 2020-05-18 ENCOUNTER — HISTORICAL (OUTPATIENT)
Dept: ADMINISTRATIVE | Facility: HOSPITAL | Age: 60
End: 2020-05-18

## 2020-05-18 LAB
ABS NEUT (OLG): 1.82 X10(3)/MCL (ref 2.1–9.2)
BASOPHILS # BLD AUTO: 0 X10(3)/MCL (ref 0–0.2)
BASOPHILS NFR BLD AUTO: 0.3 %
EOSINOPHIL # BLD AUTO: 0.2 X10(3)/MCL (ref 0–0.9)
EOSINOPHIL NFR BLD AUTO: 6.1 %
ERYTHROCYTE [DISTWIDTH] IN BLOOD BY AUTOMATED COUNT: 12.3 % (ref 11.5–17)
HCT VFR BLD AUTO: 40 % (ref 37–47)
HGB BLD-MCNC: 12.7 GM/DL (ref 12–16)
LYMPHOCYTES # BLD AUTO: 0.8 X10(3)/MCL (ref 0.6–4.6)
LYMPHOCYTES NFR BLD AUTO: 27 %
MCH RBC QN AUTO: 28.7 PG (ref 27–31)
MCHC RBC AUTO-ENTMCNC: 31.8 GM/DL (ref 33–36)
MCV RBC AUTO: 90.5 FL (ref 80–94)
MONOCYTES # BLD AUTO: 0.1 X10(3)/MCL (ref 0.1–1.3)
MONOCYTES NFR BLD AUTO: 4.4 %
NEUTROPHILS # BLD AUTO: 1.8 X10(3)/MCL (ref 2.1–9.2)
NEUTROPHILS NFR BLD AUTO: 62.2 %
PLATELET # BLD AUTO: 45 X10(3)/MCL (ref 130–400)
PMV BLD AUTO: 11.9 FL (ref 9.4–12.4)
RBC # BLD AUTO: 4.42 X10(6)/MCL (ref 4.2–5.4)
WBC # SPEC AUTO: 2.9 X10(3)/MCL (ref 4.5–11.5)

## 2020-06-15 ENCOUNTER — HISTORICAL (OUTPATIENT)
Dept: ADMINISTRATIVE | Facility: HOSPITAL | Age: 60
End: 2020-06-15

## 2020-07-06 ENCOUNTER — HISTORICAL (OUTPATIENT)
Dept: ADMINISTRATIVE | Facility: HOSPITAL | Age: 60
End: 2020-07-06

## 2020-07-06 LAB
ABS NEUT (OLG): 2.11 X10(3)/MCL (ref 2.1–9.2)
ALBUMIN SERPL-MCNC: 3.4 GM/DL (ref 3.4–5)
ALBUMIN/GLOB SERPL: 0.9 RATIO (ref 1.1–2)
ALP SERPL-CCNC: 192 UNIT/L (ref 45–117)
ALT SERPL-CCNC: 92 UNIT/L (ref 12–78)
APPEARANCE, UA: CLEAR
AST SERPL-CCNC: 62 UNIT/L (ref 15–37)
BACTERIA #/AREA URNS AUTO: ABNORMAL /HPF
BASOPHILS # BLD AUTO: 0 X10(3)/MCL (ref 0–0.2)
BASOPHILS NFR BLD AUTO: 0 %
BILIRUB SERPL-MCNC: 0.4 MG/DL (ref 0.2–1)
BILIRUB UR QL STRIP: NEGATIVE
BILIRUBIN DIRECT+TOT PNL SERPL-MCNC: 0.2 MG/DL
BILIRUBIN DIRECT+TOT PNL SERPL-MCNC: 0.2 MG/DL (ref 0–0.2)
BUN SERPL-MCNC: 29 MG/DL (ref 7–18)
CALCIUM SERPL-MCNC: 8.8 MG/DL (ref 8.5–10.1)
CHLORIDE SERPL-SCNC: 105 MMOL/L (ref 98–107)
CHOLEST SERPL-MCNC: 109 MG/DL
CHOLEST/HDLC SERPL: 2.3 {RATIO} (ref 0–4.4)
CO2 SERPL-SCNC: 26 MMOL/L (ref 21–32)
COLOR UR: YELLOW
CREAT SERPL-MCNC: 0.8 MG/DL (ref 0.6–1.3)
CREAT UR-MCNC: 52 MG/DL
EOSINOPHIL # BLD AUTO: 0.1 X10(3)/MCL (ref 0–0.9)
EOSINOPHIL NFR BLD AUTO: 4 %
ERYTHROCYTE [DISTWIDTH] IN BLOOD BY AUTOMATED COUNT: 12.4 % (ref 11.5–14.5)
EST. AVERAGE GLUCOSE BLD GHB EST-MCNC: 272 MG/DL
GLOBULIN SER-MCNC: 3.6 GM/ML (ref 2.3–3.5)
GLUCOSE (UA): >1000 MG/DL
GLUCOSE SERPL-MCNC: 110 MG/DL (ref 74–106)
HBA1C MFR BLD: 11.1 % (ref 4.2–6.3)
HCT VFR BLD AUTO: 40.3 % (ref 35–46)
HDLC SERPL-MCNC: 48 MG/DL (ref 40–59)
HGB BLD-MCNC: 13.1 GM/DL (ref 12–16)
HGB UR QL STRIP: NEGATIVE
HYALINE CASTS #/AREA URNS LPF: ABNORMAL /LPF
IMM GRANULOCYTES # BLD AUTO: 0.01 10*3/UL
IMM GRANULOCYTES NFR BLD AUTO: 0 %
KETONES UR QL STRIP: 20 MG/DL
LDLC SERPL CALC-MCNC: 37 MG/DL
LEUKOCYTE ESTERASE UR QL STRIP: NEGATIVE
LYMPHOCYTES # BLD AUTO: 0.8 X10(3)/MCL (ref 0.6–4.6)
LYMPHOCYTES NFR BLD AUTO: 25 %
MCH RBC QN AUTO: 28.4 PG (ref 26–34)
MCHC RBC AUTO-ENTMCNC: 32.5 GM/DL (ref 31–37)
MCV RBC AUTO: 87.4 FL (ref 80–100)
MICROALBUMIN UR-MCNC: 45.1 MG/L (ref 0–19)
MICROALBUMIN/CREAT RATIO PNL UR: 86.7 MCG/MG CR (ref 0–29)
MONOCYTES # BLD AUTO: 0.2 X10(3)/MCL (ref 0.1–1.3)
MONOCYTES NFR BLD AUTO: 5 %
NEUTROPHILS # BLD AUTO: 2.11 X10(3)/MCL (ref 2.1–9.2)
NEUTROPHILS NFR BLD AUTO: 65 %
NITRITE UR QL STRIP: NEGATIVE
PH UR STRIP: 5.5 [PH] (ref 4.5–8)
PLATELET # BLD AUTO: 43 X10(3)/MCL (ref 130–400)
PMV BLD AUTO: 12.5 FL (ref 7.4–10.4)
POTASSIUM SERPL-SCNC: 3.3 MMOL/L (ref 3.5–5.1)
PROT SERPL-MCNC: 7 GM/DL (ref 6.4–8.2)
PROT UR QL STRIP: 10 MG/DL
RBC # BLD AUTO: 4.61 X10(6)/MCL (ref 4–5.2)
RBC #/AREA URNS AUTO: ABNORMAL /HPF
SODIUM SERPL-SCNC: 139 MMOL/L (ref 136–145)
SP GR UR STRIP: 1.03 (ref 1–1.03)
SQUAMOUS #/AREA URNS LPF: ABNORMAL /LPF
T4 FREE SERPL-MCNC: 0.83 NG/DL (ref 0.76–1.46)
TRIGL SERPL-MCNC: 118 MG/DL
TSH SERPL-ACNC: 1.38 MIU/L (ref 0.36–3.74)
UROBILINOGEN UR STRIP-ACNC: NORMAL
VLDLC SERPL CALC-MCNC: 24 MG/DL
WBC # SPEC AUTO: 3.2 X10(3)/MCL (ref 4.5–11)
WBC #/AREA URNS AUTO: ABNORMAL /HPF

## 2020-07-20 ENCOUNTER — HISTORICAL (OUTPATIENT)
Dept: ADMINISTRATIVE | Facility: HOSPITAL | Age: 60
End: 2020-07-20

## 2020-07-20 LAB
ALBUMIN SERPL-MCNC: 3.2 GM/DL (ref 3.5–5)
ALBUMIN/GLOB SERPL: 1.2 RATIO (ref 1.1–2)
ALP SERPL-CCNC: 136 UNIT/L (ref 40–150)
ALT SERPL-CCNC: 53 UNIT/L (ref 0–55)
AST SERPL-CCNC: 61 UNIT/L (ref 5–34)
BILIRUB SERPL-MCNC: 0.6 MG/DL
BILIRUBIN DIRECT+TOT PNL SERPL-MCNC: 0.3 MG/DL (ref 0–0.5)
BILIRUBIN DIRECT+TOT PNL SERPL-MCNC: 0.3 MG/DL (ref 0–0.8)
BUN SERPL-MCNC: 29.3 MG/DL (ref 9.8–20.1)
CALCIUM SERPL-MCNC: 8.6 MG/DL (ref 8.4–10.2)
CHLORIDE SERPL-SCNC: 108 MMOL/L (ref 98–107)
CO2 SERPL-SCNC: 31 MMOL/L (ref 22–29)
CREAT SERPL-MCNC: 0.95 MG/DL (ref 0.55–1.02)
CRP SERPL HS-MCNC: 2.93 MG/DL
ERYTHROCYTE [SEDIMENTATION RATE] IN BLOOD: 8 MM/HR (ref 0–20)
GLOBULIN SER-MCNC: 2.7 GM/DL (ref 2.4–3.5)
GLUCOSE SERPL-MCNC: 128 MG/DL (ref 74–100)
POTASSIUM SERPL-SCNC: 4.4 MMOL/L (ref 3.5–5.1)
PROT SERPL-MCNC: 5.9 GM/DL (ref 6.4–8.3)
SODIUM SERPL-SCNC: 144 MMOL/L (ref 136–145)

## 2020-08-04 ENCOUNTER — HISTORICAL (OUTPATIENT)
Dept: ADMINISTRATIVE | Facility: HOSPITAL | Age: 60
End: 2020-08-04

## 2020-08-04 LAB
ALBUMIN SERPL-MCNC: 3.2 GM/DL (ref 3.4–5)
ALBUMIN/GLOB SERPL: 0.8 RATIO (ref 1.1–2)
ALP SERPL-CCNC: 171 UNIT/L (ref 45–117)
ALT SERPL-CCNC: 70 UNIT/L (ref 12–78)
AST SERPL-CCNC: 63 UNIT/L (ref 15–37)
BILIRUB SERPL-MCNC: 0.4 MG/DL (ref 0.2–1)
BILIRUBIN DIRECT+TOT PNL SERPL-MCNC: 0.2 MG/DL
BILIRUBIN DIRECT+TOT PNL SERPL-MCNC: 0.2 MG/DL (ref 0–0.2)
BUN SERPL-MCNC: 28 MG/DL (ref 7–18)
CALCIUM SERPL-MCNC: 8.8 MG/DL (ref 8.5–10.1)
CHLORIDE SERPL-SCNC: 107 MMOL/L (ref 98–107)
CO2 SERPL-SCNC: 26 MMOL/L (ref 21–32)
CREAT SERPL-MCNC: 1.1 MG/DL (ref 0.6–1.3)
GLOBULIN SER-MCNC: 3.8 GM/ML (ref 2.3–3.5)
GLUCOSE SERPL-MCNC: 226 MG/DL (ref 74–106)
POTASSIUM SERPL-SCNC: 4.5 MMOL/L (ref 3.5–5.1)
PROT SERPL-MCNC: 7 GM/DL (ref 6.4–8.2)
SODIUM SERPL-SCNC: 139 MMOL/L (ref 136–145)
T4 FREE SERPL-MCNC: 0.78 NG/DL (ref 0.76–1.46)

## 2020-08-20 ENCOUNTER — HISTORICAL (OUTPATIENT)
Dept: ADMINISTRATIVE | Facility: HOSPITAL | Age: 60
End: 2020-08-20

## 2020-08-20 LAB
ABS NEUT (OLG): 1.46 X10(3)/MCL (ref 2.1–9.2)
ALBUMIN SERPL-MCNC: 3.7 GM/DL (ref 3.5–5)
ALBUMIN/GLOB SERPL: 1.2 RATIO (ref 1.1–2)
ALP SERPL-CCNC: 124 UNIT/L (ref 40–150)
ALT SERPL-CCNC: 29 UNIT/L (ref 0–55)
AST SERPL-CCNC: 39 UNIT/L (ref 5–34)
BASOPHILS # BLD AUTO: 0 X10(3)/MCL (ref 0–0.2)
BASOPHILS NFR BLD AUTO: 0.4 %
BILIRUB SERPL-MCNC: 0.4 MG/DL
BILIRUBIN DIRECT+TOT PNL SERPL-MCNC: 0.2 MG/DL (ref 0–0.5)
BILIRUBIN DIRECT+TOT PNL SERPL-MCNC: 0.2 MG/DL (ref 0–0.8)
BUN SERPL-MCNC: 29.1 MG/DL (ref 9.8–20.1)
CALCIUM SERPL-MCNC: 8.7 MG/DL (ref 8.4–10.2)
CHLORIDE SERPL-SCNC: 107 MMOL/L (ref 98–107)
CO2 SERPL-SCNC: 24 MMOL/L (ref 22–29)
CREAT SERPL-MCNC: 0.94 MG/DL (ref 0.55–1.02)
EOSINOPHIL # BLD AUTO: 0.2 X10(3)/MCL (ref 0–0.9)
EOSINOPHIL NFR BLD AUTO: 7 %
ERYTHROCYTE [DISTWIDTH] IN BLOOD BY AUTOMATED COUNT: 12.5 % (ref 11.5–17)
FERRITIN SERPL-MCNC: 57.88 NG/ML (ref 4.63–204)
GLOBULIN SER-MCNC: 3.2 GM/DL (ref 2.4–3.5)
GLUCOSE SERPL-MCNC: 113 MG/DL (ref 74–100)
HCT VFR BLD AUTO: 37 % (ref 37–47)
HGB BLD-MCNC: 12.1 GM/DL (ref 12–16)
IRON SATN MFR SERPL: 18 % (ref 20–50)
IRON SERPL-MCNC: 69 UG/DL (ref 50–170)
LYMPHOCYTES # BLD AUTO: 0.6 X10(3)/MCL (ref 0.6–4.6)
LYMPHOCYTES NFR BLD AUTO: 24.8 %
MCH RBC QN AUTO: 28.3 PG (ref 27–31)
MCHC RBC AUTO-ENTMCNC: 32.7 GM/DL (ref 33–36)
MCV RBC AUTO: 86.7 FL (ref 80–94)
MONOCYTES # BLD AUTO: 0.3 X10(3)/MCL (ref 0.1–1.3)
MONOCYTES NFR BLD AUTO: 10.9 %
NEUTROPHILS # BLD AUTO: 1.5 X10(3)/MCL (ref 2.1–9.2)
NEUTROPHILS NFR BLD AUTO: 56.5 %
PLATELET # BLD AUTO: 68 X10(3)/MCL (ref 130–400)
PMV BLD AUTO: 10.3 FL (ref 9.4–12.4)
POTASSIUM SERPL-SCNC: 4.8 MMOL/L (ref 3.5–5.1)
PROT SERPL-MCNC: 6.9 GM/DL (ref 6.4–8.3)
RBC # BLD AUTO: 4.27 X10(6)/MCL (ref 4.2–5.4)
SODIUM SERPL-SCNC: 142 MMOL/L (ref 136–145)
TIBC SERPL-MCNC: 325 UG/DL (ref 70–310)
TIBC SERPL-MCNC: 394 UG/DL (ref 250–450)
TRANSFERRIN SERPL-MCNC: 368 MG/DL (ref 180–382)
VIT B12 SERPL-MCNC: 620 PG/ML (ref 213–816)
WBC # SPEC AUTO: 2.6 X10(3)/MCL (ref 4.5–11.5)

## 2020-09-06 ENCOUNTER — HISTORICAL (OUTPATIENT)
Dept: ADMINISTRATIVE | Facility: HOSPITAL | Age: 60
End: 2020-09-06

## 2020-10-06 ENCOUNTER — HISTORICAL (OUTPATIENT)
Dept: ADMINISTRATIVE | Facility: HOSPITAL | Age: 60
End: 2020-10-06

## 2020-10-06 LAB
ALBUMIN SERPL-MCNC: 4.1 GM/DL (ref 3.5–5)
ALBUMIN/GLOB SERPL: 1.4 RATIO (ref 1.1–2)
ALP SERPL-CCNC: 113 UNIT/L (ref 40–150)
ALT SERPL-CCNC: 25 UNIT/L (ref 0–55)
AST SERPL-CCNC: 27 UNIT/L (ref 5–34)
BILIRUB SERPL-MCNC: 0.4 MG/DL
BILIRUBIN DIRECT+TOT PNL SERPL-MCNC: 0.2 MG/DL (ref 0–0.5)
BILIRUBIN DIRECT+TOT PNL SERPL-MCNC: 0.2 MG/DL (ref 0–0.8)
BUN SERPL-MCNC: 23.8 MG/DL (ref 9.8–20.1)
CALCIUM SERPL-MCNC: 9 MG/DL (ref 8.4–10.2)
CHLORIDE SERPL-SCNC: 107 MMOL/L (ref 98–107)
CO2 SERPL-SCNC: 23 MMOL/L (ref 22–29)
CREAT SERPL-MCNC: 0.82 MG/DL (ref 0.55–1.02)
CRP SERPL HS-MCNC: 0.79 MG/DL
ERYTHROCYTE [DISTWIDTH] IN BLOOD BY AUTOMATED COUNT: 12.4 % (ref 11.5–17)
ERYTHROCYTE [SEDIMENTATION RATE] IN BLOOD: 13 MM/HR (ref 0–20)
GLOBULIN SER-MCNC: 2.9 GM/DL (ref 2.4–3.5)
GLUCOSE SERPL-MCNC: 166 MG/DL (ref 74–100)
HCT VFR BLD AUTO: 38.9 % (ref 37–47)
HGB BLD-MCNC: 12.4 GM/DL (ref 12–16)
MCH RBC QN AUTO: 26.7 PG (ref 27–31)
MCHC RBC AUTO-ENTMCNC: 31.9 GM/DL (ref 33–36)
MCV RBC AUTO: 83.8 FL (ref 80–94)
PLATELET # BLD AUTO: 66 X10(3)/MCL (ref 130–400)
PMV BLD AUTO: 12 FL (ref 9.4–12.4)
POTASSIUM SERPL-SCNC: 4.3 MMOL/L (ref 3.5–5.1)
PROT SERPL-MCNC: 7 GM/DL (ref 6.4–8.3)
RBC # BLD AUTO: 4.64 X10(6)/MCL (ref 4.2–5.4)
SODIUM SERPL-SCNC: 140 MMOL/L (ref 136–145)
WBC # SPEC AUTO: 2.4 X10(3)/MCL (ref 4.5–11.5)

## 2020-10-13 ENCOUNTER — HISTORICAL (OUTPATIENT)
Dept: ADMINISTRATIVE | Facility: HOSPITAL | Age: 60
End: 2020-10-13

## 2020-10-13 LAB
ABS NEUT (OLG): 1.62 X10(3)/MCL (ref 2.1–9.2)
ALBUMIN SERPL-MCNC: 3.8 GM/DL (ref 3.4–5)
ALBUMIN/GLOB SERPL: 1 RATIO (ref 1.1–2)
ALP SERPL-CCNC: 114 UNIT/L (ref 45–117)
ALT SERPL-CCNC: 37 UNIT/L (ref 12–78)
AST SERPL-CCNC: 29 UNIT/L (ref 15–37)
BASOPHILS # BLD AUTO: 0 X10(3)/MCL (ref 0–0.2)
BASOPHILS NFR BLD AUTO: 0 %
BILIRUB SERPL-MCNC: 0.3 MG/DL (ref 0.2–1)
BILIRUBIN DIRECT+TOT PNL SERPL-MCNC: 0.1 MG/DL (ref 0–0.2)
BILIRUBIN DIRECT+TOT PNL SERPL-MCNC: 0.2 MG/DL
BUN SERPL-MCNC: 25 MG/DL (ref 7–18)
CALCIUM SERPL-MCNC: 9.6 MG/DL (ref 8.5–10.1)
CHLORIDE SERPL-SCNC: 110 MMOL/L (ref 98–107)
CHOLEST SERPL-MCNC: 179 MG/DL
CHOLEST/HDLC SERPL: 3.4 {RATIO} (ref 0–4.4)
CO2 SERPL-SCNC: 31 MMOL/L (ref 21–32)
CREAT SERPL-MCNC: 0.8 MG/DL (ref 0.6–1.3)
EOSINOPHIL # BLD AUTO: 0.2 X10(3)/MCL (ref 0–0.9)
EOSINOPHIL NFR BLD AUTO: 9 %
ERYTHROCYTE [DISTWIDTH] IN BLOOD BY AUTOMATED COUNT: 12.3 % (ref 11.5–14.5)
EST. AVERAGE GLUCOSE BLD GHB EST-MCNC: 148 MG/DL
GLOBULIN SER-MCNC: 4 GM/ML (ref 2.3–3.5)
GLUCOSE SERPL-MCNC: 93 MG/DL (ref 74–106)
HBA1C MFR BLD: 6.8 % (ref 4.2–6.3)
HCT VFR BLD AUTO: 39.5 % (ref 35–46)
HDLC SERPL-MCNC: 53 MG/DL (ref 40–59)
HGB BLD-MCNC: 12.7 GM/DL (ref 12–16)
IMM GRANULOCYTES # BLD AUTO: 0.01 10*3/UL
IMM GRANULOCYTES NFR BLD AUTO: 0 %
LDLC SERPL CALC-MCNC: 83 MG/DL
LYMPHOCYTES # BLD AUTO: 0.6 X10(3)/MCL (ref 0.6–4.6)
LYMPHOCYTES NFR BLD AUTO: 24 %
MCH RBC QN AUTO: 27 PG (ref 26–34)
MCHC RBC AUTO-ENTMCNC: 32.2 GM/DL (ref 31–37)
MCV RBC AUTO: 83.9 FL (ref 80–100)
MONOCYTES # BLD AUTO: 0.2 X10(3)/MCL (ref 0.1–1.3)
MONOCYTES NFR BLD AUTO: 7 %
NEUTROPHILS # BLD AUTO: 1.62 X10(3)/MCL (ref 2.1–9.2)
NEUTROPHILS NFR BLD AUTO: 59 %
PLATELET # BLD AUTO: 58 X10(3)/MCL (ref 130–400)
PMV BLD AUTO: 10.8 FL (ref 7.4–10.4)
POTASSIUM SERPL-SCNC: 4.3 MMOL/L (ref 3.5–5.1)
PROT SERPL-MCNC: 7.8 GM/DL (ref 6.4–8.2)
RBC # BLD AUTO: 4.71 X10(6)/MCL (ref 4–5.2)
SODIUM SERPL-SCNC: 143 MMOL/L (ref 136–145)
T4 FREE SERPL-MCNC: 0.9 NG/DL (ref 0.76–1.46)
TRIGL SERPL-MCNC: 214 MG/DL
TSH SERPL-ACNC: 2.52 MIU/L (ref 0.36–3.74)
VLDLC SERPL CALC-MCNC: 43 MG/DL
WBC # SPEC AUTO: 2.7 X10(3)/MCL (ref 4.5–11)

## 2020-11-16 ENCOUNTER — HISTORICAL (OUTPATIENT)
Dept: ADMINISTRATIVE | Facility: HOSPITAL | Age: 60
End: 2020-11-16

## 2020-11-16 LAB
ABS NEUT (OLG): 1.36 X10(3)/MCL (ref 2.1–9.2)
ANISOCYTOSIS BLD QL SMEAR: 1
BASOPHILS NFR BLD MANUAL: 1 % (ref 0–2)
EOSINOPHIL NFR BLD MANUAL: 8 % (ref 0–8)
ERYTHROCYTE [DISTWIDTH] IN BLOOD BY AUTOMATED COUNT: 12.9 % (ref 11.5–17)
HCT VFR BLD AUTO: 37.7 % (ref 37–47)
HGB BLD-MCNC: 12.1 GM/DL (ref 12–16)
LYMPHOCYTES NFR BLD MANUAL: 17 % (ref 13–40)
MCH RBC QN AUTO: 26.2 PG (ref 27–31)
MCHC RBC AUTO-ENTMCNC: 32.1 GM/DL (ref 33–36)
MCV RBC AUTO: 81.8 FL (ref 80–94)
MICROCYTES BLD QL SMEAR: 1
MONOCYTES NFR BLD MANUAL: 4 % (ref 2–11)
NEUTROPHILS NFR BLD MANUAL: 70 % (ref 47–80)
PLATELET # BLD AUTO: 64 X10(3)/MCL (ref 130–400)
PLATELET # BLD EST: ABNORMAL 10*3/UL
PMV BLD AUTO: 12 FL (ref 7.4–10.4)
RBC # BLD AUTO: 4.61 X10(6)/MCL (ref 4.2–5.4)
RBC MORPH BLD: ABNORMAL
WBC # SPEC AUTO: 2.3 X10(3)/MCL (ref 4.5–11.5)

## 2020-11-23 ENCOUNTER — HISTORICAL (OUTPATIENT)
Dept: ADMINISTRATIVE | Facility: HOSPITAL | Age: 60
End: 2020-11-23

## 2020-11-23 LAB
ABS NEUT (OLG): 1.64 X10(3)/MCL (ref 2.1–9.2)
ALBUMIN SERPL-MCNC: 4 GM/DL (ref 3.5–5)
ALBUMIN/GLOB SERPL: 1.3 RATIO (ref 1.1–2)
ALP SERPL-CCNC: 116 UNIT/L (ref 40–150)
ALT SERPL-CCNC: 26 UNIT/L (ref 0–55)
AST SERPL-CCNC: 30 UNIT/L (ref 5–34)
BASOPHILS # BLD AUTO: 0 X10(3)/MCL (ref 0–0.2)
BASOPHILS NFR BLD AUTO: 0.4 %
BILIRUB SERPL-MCNC: 0.4 MG/DL
BILIRUBIN DIRECT+TOT PNL SERPL-MCNC: 0.2 MG/DL (ref 0–0.5)
BILIRUBIN DIRECT+TOT PNL SERPL-MCNC: 0.2 MG/DL (ref 0–0.8)
BUN SERPL-MCNC: 20 MG/DL (ref 9.8–20.1)
CALCIUM SERPL-MCNC: 9.3 MG/DL (ref 8.4–10.2)
CHLORIDE SERPL-SCNC: 105 MMOL/L (ref 98–107)
CO2 SERPL-SCNC: 24 MMOL/L (ref 22–29)
CREAT SERPL-MCNC: 0.98 MG/DL (ref 0.55–1.02)
EOSINOPHIL # BLD AUTO: 0.1 X10(3)/MCL (ref 0–0.9)
EOSINOPHIL NFR BLD AUTO: 4.4 %
ERYTHROCYTE [DISTWIDTH] IN BLOOD BY AUTOMATED COUNT: 13 % (ref 11.5–17)
FERRITIN SERPL-MCNC: 28.47 NG/ML (ref 4.63–204)
GLOBULIN SER-MCNC: 3.1 GM/DL (ref 2.4–3.5)
GLUCOSE SERPL-MCNC: 183 MG/DL (ref 74–100)
HCT VFR BLD AUTO: 37.3 % (ref 37–47)
HGB BLD-MCNC: 12.3 GM/DL (ref 12–16)
IRON SATN MFR SERPL: 22 % (ref 20–50)
IRON SERPL-MCNC: 87 UG/DL (ref 50–170)
LYMPHOCYTES # BLD AUTO: 0.5 X10(3)/MCL (ref 0.6–4.6)
LYMPHOCYTES NFR BLD AUTO: 21.5 %
MCH RBC QN AUTO: 26.7 PG (ref 27–31)
MCHC RBC AUTO-ENTMCNC: 33 GM/DL (ref 33–36)
MCV RBC AUTO: 81.1 FL (ref 80–94)
MONOCYTES # BLD AUTO: 0.2 X10(3)/MCL (ref 0.1–1.3)
MONOCYTES NFR BLD AUTO: 8.4 %
NEUTROPHILS # BLD AUTO: 1.6 X10(3)/MCL (ref 2.1–9.2)
NEUTROPHILS NFR BLD AUTO: 65.3 %
PLATELET # BLD AUTO: 62 X10(3)/MCL (ref 130–400)
PMV BLD AUTO: 10.6 FL (ref 9.4–12.4)
POTASSIUM SERPL-SCNC: 4.5 MMOL/L (ref 3.5–5.1)
PROT SERPL-MCNC: 7.1 GM/DL (ref 6.4–8.3)
RBC # BLD AUTO: 4.6 X10(6)/MCL (ref 4.2–5.4)
SODIUM SERPL-SCNC: 141 MMOL/L (ref 136–145)
TIBC SERPL-MCNC: 317 UG/DL (ref 70–310)
TIBC SERPL-MCNC: 404 UG/DL (ref 250–450)
TRANSFERRIN SERPL-MCNC: 348 MG/DL (ref 180–382)
VIT B12 SERPL-MCNC: 578 PG/ML (ref 213–816)
WBC # SPEC AUTO: 2.5 X10(3)/MCL (ref 4.5–11.5)

## 2020-12-11 ENCOUNTER — HISTORICAL (OUTPATIENT)
Dept: ADMINISTRATIVE | Facility: HOSPITAL | Age: 60
End: 2020-12-11

## 2020-12-11 LAB
ABS NEUT (OLG): 0.87 X10(3)/MCL (ref 2.1–9.2)
ALBUMIN SERPL-MCNC: 3.4 GM/DL (ref 3.5–5)
ALBUMIN/GLOB SERPL: 1 RATIO (ref 1.1–2)
ALP SERPL-CCNC: 111 UNIT/L (ref 40–150)
ALT SERPL-CCNC: 33 UNIT/L (ref 0–55)
AST SERPL-CCNC: 42 UNIT/L (ref 5–34)
BASOPHILS NFR BLD MANUAL: 0 %
BILIRUB SERPL-MCNC: 0.3 MG/DL
BILIRUBIN DIRECT+TOT PNL SERPL-MCNC: 0.1 MG/DL (ref 0–0.8)
BILIRUBIN DIRECT+TOT PNL SERPL-MCNC: 0.2 MG/DL (ref 0–0.5)
BNP BLD-MCNC: <10 PG/ML
BUN SERPL-MCNC: 22 MG/DL (ref 9.8–20.1)
CALCIUM SERPL-MCNC: 9 MG/DL (ref 8.4–10.2)
CHLORIDE SERPL-SCNC: 105 MMOL/L (ref 98–107)
CK MB SERPL-MCNC: 0.7 NG/ML
CK SERPL-CCNC: 99 U/L (ref 29–168)
CO2 SERPL-SCNC: 25 MMOL/L (ref 22–29)
CREAT SERPL-MCNC: 0.97 MG/DL (ref 0.55–1.02)
EOSINOPHIL NFR BLD MANUAL: 0 %
ERYTHROCYTE [DISTWIDTH] IN BLOOD BY AUTOMATED COUNT: 13.4 % (ref 11.5–14.5)
GLOBULIN SER-MCNC: 3.3 GM/DL (ref 2.4–3.5)
GLUCOSE SERPL-MCNC: 178 MG/DL (ref 74–100)
GRANULOCYTES NFR BLD MANUAL: 74 % (ref 43–75)
HCO3 UR-SCNC: 25.4 MMOL/L (ref 22–26)
HCT VFR BLD AUTO: 38.5 % (ref 35–46)
HGB BLD-MCNC: 11.4 GM/DL (ref 12–16)
HYPOCHROMIA BLD QL SMEAR: ABNORMAL
LACTATE SERPL-SCNC: 0.9 MMOL/L (ref 0.5–2.2)
LYMPHOCYTES NFR BLD MANUAL: 16 % (ref 20.5–51.1)
MCH RBC QN AUTO: 26 PG (ref 26–34)
MCHC RBC AUTO-ENTMCNC: 29.6 GM/DL (ref 31–37)
MCV RBC AUTO: 87.9 FL (ref 80–100)
MONOCYTES NFR BLD MANUAL: 10 % (ref 2–9)
O2 HGB ARTERIAL: 90.7 % (ref 94–100)
PCO2 BLDA: 45 MMHG (ref 35–45)
PH SMN: 7.36 [PH] (ref 7.35–7.45)
PLATELET # BLD AUTO: 44 X10(3)/MCL (ref 130–400)
PLATELET # BLD EST: ABNORMAL 10*3/UL
PMV BLD AUTO: 10.6 FL (ref 7.4–10.4)
PO2 BLDA: 58 MMHG (ref 80–100)
POC ALLENS TEST: POSITIVE
POC BE: -0.3 (ref -2–2)
POC CO HGB: 2 %
POC CO2: 26.8 MMOL/L (ref 22–26)
POC MET HGB: 0.4 % (ref 0.4–1.5)
POC SAMPLESOURCE: ABNORMAL
POC SATURATED O2: 92.9 %
POC SITE: ABNORMAL
POC THB: 11.6 GM/DL (ref 12–16)
POC TREATMENT: ABNORMAL
POC TROPONIN: 0 NG/ML (ref 0–0.08)
POTASSIUM SERPL-SCNC: 4.7 MMOL/L (ref 3.5–5.1)
PROT SERPL-MCNC: 6.7 GM/DL (ref 6.4–8.3)
RBC # BLD AUTO: 4.38 X10(6)/MCL (ref 4–5.2)
RBC MORPH BLD: ABNORMAL
SARS-COV-2 AG RESP QL IA.RAPID: POSITIVE
SETTING 1 ABG: ABNORMAL
SODIUM SERPL-SCNC: 138 MMOL/L (ref 136–145)
WBC # SPEC AUTO: 1.6 X10(3)/MCL (ref 4.5–11)

## 2020-12-12 LAB
ABS NEUT (OLG): 0.87 X10(3)/MCL (ref 2.1–9.2)
ALBUMIN SERPL-MCNC: 3.3 GM/DL (ref 3.5–5)
ALBUMIN/GLOB SERPL: 1 RATIO (ref 1.1–2)
ALP SERPL-CCNC: 110 UNIT/L (ref 40–150)
ALT SERPL-CCNC: 31 UNIT/L (ref 0–55)
AMPHET UR QL SCN: NEGATIVE
ANISOCYTOSIS BLD QL SMEAR: NORMAL
AST SERPL-CCNC: 43 UNIT/L (ref 5–34)
BARBITURATE SCN PRESENT UR: NEGATIVE
BASOPHILS NFR BLD MANUAL: 0 %
BENZODIAZ UR QL SCN: NEGATIVE
BILIRUB SERPL-MCNC: 0.3 MG/DL
BILIRUBIN DIRECT+TOT PNL SERPL-MCNC: 0.1 MG/DL (ref 0–0.5)
BILIRUBIN DIRECT+TOT PNL SERPL-MCNC: 0.2 MG/DL (ref 0–0.8)
BUN SERPL-MCNC: 19.1 MG/DL (ref 9.8–20.1)
CALCIUM SERPL-MCNC: 8.9 MG/DL (ref 8.4–10.2)
CANNABINOIDS UR QL SCN: NEGATIVE
CHLORIDE SERPL-SCNC: 105 MMOL/L (ref 98–107)
CO2 SERPL-SCNC: 22 MMOL/L (ref 22–29)
COCAINE UR QL SCN: NEGATIVE
CREAT SERPL-MCNC: 0.93 MG/DL (ref 0.55–1.02)
CRP SERPL-MCNC: 4.88 MG/DL
D DIMER PPP IA.FEU-MCNC: 0.96 MCG/ML FEU
DEPRECATED CALCIDIOL+CALCIFEROL SERPL-MC: 29.9 NG/ML (ref 30–80)
EOSINOPHIL NFR BLD MANUAL: 0 %
ERYTHROCYTE [DISTWIDTH] IN BLOOD BY AUTOMATED COUNT: 13.5 % (ref 11.5–14.5)
ERYTHROCYTE [SEDIMENTATION RATE] IN BLOOD: 22 MM/HR (ref 0–20)
EST. AVERAGE GLUCOSE BLD GHB EST-MCNC: 148.5 MG/DL
FERRITIN SERPL-MCNC: 101.16 NG/ML (ref 4.63–204)
GLOBULIN SER-MCNC: 3.3 GM/DL (ref 2.4–3.5)
GLUCOSE SERPL-MCNC: 189 MG/DL (ref 74–100)
GRANULOCYTES NFR BLD MANUAL: 64 % (ref 43–75)
HBA1C MFR BLD: 6.8 %
HCT VFR BLD AUTO: 36 % (ref 35–46)
HGB BLD-MCNC: 11.3 GM/DL (ref 12–16)
LACTATE SERPL-SCNC: 0.9 MMOL/L (ref 0.5–2.2)
LDH SERPL-CCNC: 402 UNIT/L (ref 140–271)
LYMPHOCYTES NFR BLD MANUAL: 30 % (ref 20.5–51.1)
MCH RBC QN AUTO: 26.1 PG (ref 26–34)
MCHC RBC AUTO-ENTMCNC: 31.4 GM/DL (ref 31–37)
MCV RBC AUTO: 83.1 FL (ref 80–100)
MONOCYTES NFR BLD MANUAL: 4 % (ref 2–9)
NEUTS BAND NFR BLD MANUAL: 2 % (ref 0–10)
OPIATES UR QL SCN: NEGATIVE
PCP UR QL: NEGATIVE
PH UR STRIP.AUTO: 6 [PH] (ref 3–11)
PLATELET # BLD AUTO: 43 X10(3)/MCL (ref 130–400)
PLATELET # BLD EST: NORMAL 10*3/UL
PMV BLD AUTO: 11.6 FL (ref 7.4–10.4)
POIKILOCYTOSIS BLD QL SMEAR: NORMAL
POLYCHROMASIA BLD QL SMEAR: NORMAL
POTASSIUM SERPL-SCNC: 4.9 MMOL/L (ref 3.5–5.1)
PROT SERPL-MCNC: 6.6 GM/DL (ref 6.4–8.3)
RBC # BLD AUTO: 4.33 X10(6)/MCL (ref 4–5.2)
RBC MORPH BLD: NORMAL
SCHISTOCYTES BLD QL AUTO: NORMAL
SODIUM SERPL-SCNC: 139 MMOL/L (ref 136–145)
WBC # SPEC AUTO: 1.4 X10(3)/MCL (ref 4.5–11)

## 2020-12-13 LAB
ALBUMIN SERPL-MCNC: 3.1 GM/DL (ref 3.5–5)
ALBUMIN/GLOB SERPL: 0.9 RATIO (ref 1.1–2)
ALP SERPL-CCNC: 110 UNIT/L (ref 40–150)
ALT SERPL-CCNC: 27 UNIT/L (ref 0–55)
AST SERPL-CCNC: 42 UNIT/L (ref 5–34)
BILIRUB SERPL-MCNC: 0.3 MG/DL
BILIRUBIN DIRECT+TOT PNL SERPL-MCNC: 0.1 MG/DL (ref 0–0.8)
BILIRUBIN DIRECT+TOT PNL SERPL-MCNC: 0.2 MG/DL (ref 0–0.5)
BUN SERPL-MCNC: 26 MG/DL (ref 9.8–20.1)
CALCIUM SERPL-MCNC: 9 MG/DL (ref 8.4–10.2)
CHLORIDE SERPL-SCNC: 107 MMOL/L (ref 98–107)
CO2 SERPL-SCNC: 20 MMOL/L (ref 22–29)
CREAT SERPL-MCNC: 0.82 MG/DL (ref 0.55–1.02)
GLOBULIN SER-MCNC: 3.4 GM/DL (ref 2.4–3.5)
GLUCOSE SERPL-MCNC: 278 MG/DL (ref 74–100)
POTASSIUM SERPL-SCNC: 4.2 MMOL/L (ref 3.5–5.1)
PROT SERPL-MCNC: 6.5 GM/DL (ref 6.4–8.3)
SODIUM SERPL-SCNC: 138 MMOL/L (ref 136–145)

## 2020-12-14 LAB
ABS NEUT (OLG): 2.48 X10(3)/MCL (ref 2.1–9.2)
ALBUMIN SERPL-MCNC: 3.2 GM/DL (ref 3.5–5)
ALBUMIN/GLOB SERPL: 0.9 RATIO (ref 1.1–2)
ALP SERPL-CCNC: 123 UNIT/L (ref 40–150)
ALT SERPL-CCNC: 27 UNIT/L (ref 0–55)
AST SERPL-CCNC: 53 UNIT/L (ref 5–34)
BILIRUB SERPL-MCNC: 0.3 MG/DL
BILIRUBIN DIRECT+TOT PNL SERPL-MCNC: 0.1 MG/DL (ref 0–0.8)
BILIRUBIN DIRECT+TOT PNL SERPL-MCNC: 0.2 MG/DL (ref 0–0.5)
BUN SERPL-MCNC: 24 MG/DL (ref 9.8–20.1)
CALCIUM SERPL-MCNC: 9 MG/DL (ref 8.4–10.2)
CHLORIDE SERPL-SCNC: 108 MMOL/L (ref 98–107)
CO2 SERPL-SCNC: 22 MMOL/L (ref 22–29)
CREAT SERPL-MCNC: 0.87 MG/DL (ref 0.55–1.02)
CRP SERPL-MCNC: 4.02 MG/DL
D DIMER PPP IA.FEU-MCNC: 0.86 MCG/ML FEU
ERYTHROCYTE [DISTWIDTH] IN BLOOD BY AUTOMATED COUNT: 13.4 % (ref 11.5–14.5)
ERYTHROCYTE [SEDIMENTATION RATE] IN BLOOD: 33 MM/HR (ref 0–20)
FERRITIN SERPL-MCNC: 199.14 NG/ML (ref 4.63–204)
GLOBULIN SER-MCNC: 3.7 GM/DL (ref 2.4–3.5)
GLUCOSE SERPL-MCNC: 234 MG/DL (ref 74–100)
HCT VFR BLD AUTO: 38.1 % (ref 35–46)
HGB BLD-MCNC: 12.1 GM/DL (ref 12–16)
IMM GRANULOCYTES # BLD AUTO: 0.01 10*3/UL
IMM GRANULOCYTES NFR BLD AUTO: 0 %
LDH SERPL-CCNC: 588 UNIT/L (ref 140–271)
LYMPHOCYTES # BLD AUTO: 0.4 X10(3)/MCL (ref 0.6–4.6)
LYMPHOCYTES NFR BLD AUTO: 12 %
MCH RBC QN AUTO: 25.7 PG (ref 26–34)
MCHC RBC AUTO-ENTMCNC: 31.8 GM/DL (ref 31–37)
MCV RBC AUTO: 80.9 FL (ref 80–100)
MONOCYTES # BLD AUTO: 0.2 X10(3)/MCL (ref 0.1–1.3)
MONOCYTES NFR BLD AUTO: 6 %
NEUTROPHILS # BLD AUTO: 2.48 X10(3)/MCL (ref 2.1–9.2)
NEUTROPHILS NFR BLD AUTO: 81 %
PLATELET # BLD AUTO: 60 X10(3)/MCL (ref 130–400)
PMV BLD AUTO: 10.2 FL (ref 7.4–10.4)
POTASSIUM SERPL-SCNC: 4.3 MMOL/L (ref 3.5–5.1)
PROT SERPL-MCNC: 6.9 GM/DL (ref 6.4–8.3)
RBC # BLD AUTO: 4.71 X10(6)/MCL (ref 4–5.2)
SODIUM SERPL-SCNC: 140 MMOL/L (ref 136–145)
WBC # SPEC AUTO: 3 X10(3)/MCL (ref 4.5–11)

## 2020-12-15 LAB
ALBUMIN SERPL-MCNC: 3 GM/DL (ref 3.5–5)
ALBUMIN/GLOB SERPL: 0.8 RATIO (ref 1.1–2)
ALP SERPL-CCNC: 134 UNIT/L (ref 40–150)
ALT SERPL-CCNC: 24 UNIT/L (ref 0–55)
AST SERPL-CCNC: 44 UNIT/L (ref 5–34)
BILIRUB SERPL-MCNC: 0.5 MG/DL
BILIRUBIN DIRECT+TOT PNL SERPL-MCNC: 0.2 MG/DL (ref 0–0.8)
BILIRUBIN DIRECT+TOT PNL SERPL-MCNC: 0.3 MG/DL (ref 0–0.5)
BUN SERPL-MCNC: 24 MG/DL (ref 9.8–20.1)
CALCIUM SERPL-MCNC: 8.8 MG/DL (ref 8.4–10.2)
CHLORIDE SERPL-SCNC: 109 MMOL/L (ref 98–107)
CO2 SERPL-SCNC: 20 MMOL/L (ref 22–29)
CREAT SERPL-MCNC: 0.83 MG/DL (ref 0.55–1.02)
GLOBULIN SER-MCNC: 3.7 GM/DL (ref 2.4–3.5)
GLUCOSE SERPL-MCNC: 290 MG/DL (ref 74–100)
HCO3 UR-SCNC: 23.4 MMOL/L (ref 22–26)
O2 HGB ARTERIAL: 91 % (ref 94–100)
PCO2 BLDA: 36 MMHG (ref 35–45)
PH SMN: 7.42 [PH] (ref 7.35–7.45)
PO2 BLDA: 59 MMHG (ref 80–100)
POC ALLENS TEST: POSITIVE
POC BE: -0.7 (ref -2–2)
POC CO HGB: 1.8 %
POC CO2: 24.5 MMOL/L (ref 22–26)
POC MET HGB: 0.6 % (ref 0.4–1.5)
POC SAMPLESOURCE: ABNORMAL
POC SATURATED O2: 93.1 %
POC SITE: ABNORMAL
POC THB: 12.1 GM/DL (ref 12–16)
POC TREATMENT: ABNORMAL
POTASSIUM SERPL-SCNC: 4.2 MMOL/L (ref 3.5–5.1)
PROT SERPL-MCNC: 6.7 GM/DL (ref 6.4–8.3)
SETTING 1 ABG: ABNORMAL
SETTING 2 ABG: ABNORMAL
SODIUM SERPL-SCNC: 140 MMOL/L (ref 136–145)

## 2020-12-16 LAB
ABS NEUT (OLG): 1.91 X10(3)/MCL (ref 2.1–9.2)
ALBUMIN SERPL-MCNC: 2.9 GM/DL (ref 3.5–5)
ALBUMIN/GLOB SERPL: 0.8 RATIO (ref 1.1–2)
ALP SERPL-CCNC: 146 UNIT/L (ref 40–150)
ALT SERPL-CCNC: 21 UNIT/L (ref 0–55)
AST SERPL-CCNC: 35 UNIT/L (ref 5–34)
BILIRUB SERPL-MCNC: 0.3 MG/DL
BILIRUBIN DIRECT+TOT PNL SERPL-MCNC: 0.1 MG/DL (ref 0–0.8)
BILIRUBIN DIRECT+TOT PNL SERPL-MCNC: 0.2 MG/DL (ref 0–0.5)
BUN SERPL-MCNC: 31 MG/DL (ref 9.8–20.1)
CALCIUM SERPL-MCNC: 9.3 MG/DL (ref 8.4–10.2)
CHLORIDE SERPL-SCNC: 112 MMOL/L (ref 98–107)
CO2 SERPL-SCNC: 20 MMOL/L (ref 22–29)
CREAT SERPL-MCNC: 0.84 MG/DL (ref 0.55–1.02)
CRP SERPL-MCNC: 4.26 MG/DL
D DIMER PPP IA.FEU-MCNC: 1.55 MCG/ML FEU
ERYTHROCYTE [DISTWIDTH] IN BLOOD BY AUTOMATED COUNT: 13.3 % (ref 11.5–14.5)
ERYTHROCYTE [SEDIMENTATION RATE] IN BLOOD: 29 MM/HR (ref 0–20)
FERRITIN SERPL-MCNC: 172.16 NG/ML (ref 4.63–204)
GLOBULIN SER-MCNC: 3.6 GM/DL (ref 2.4–3.5)
GLUCOSE SERPL-MCNC: 351 MG/DL (ref 74–100)
HAV IGM SERPL QL IA: NONREACTIVE
HBV CORE IGM SERPL QL IA: NONREACTIVE
HBV SURFACE AG SERPL QL IA: NONREACTIVE
HCT VFR BLD AUTO: 35.9 % (ref 35–46)
HCV AB SERPL QL IA: NONREACTIVE
HGB BLD-MCNC: 11.6 GM/DL (ref 12–16)
HIV 1+2 AB+HIV1 P24 AG SERPL QL IA: NONREACTIVE
IMM GRANULOCYTES # BLD AUTO: 0.01 10*3/UL
IMM GRANULOCYTES NFR BLD AUTO: 0 %
LDH SERPL-CCNC: 603 UNIT/L (ref 140–271)
LYMPHOCYTES # BLD AUTO: 0.4 X10(3)/MCL (ref 0.6–4.6)
LYMPHOCYTES NFR BLD AUTO: 16 %
MCH RBC QN AUTO: 25.7 PG (ref 26–34)
MCHC RBC AUTO-ENTMCNC: 32.3 GM/DL (ref 31–37)
MCV RBC AUTO: 79.4 FL (ref 80–100)
MONOCYTES # BLD AUTO: 0.1 X10(3)/MCL (ref 0.1–1.3)
MONOCYTES NFR BLD AUTO: 4 %
NEUTROPHILS # BLD AUTO: 1.91 X10(3)/MCL (ref 2.1–9.2)
NEUTROPHILS NFR BLD AUTO: 80 %
PLATELET # BLD AUTO: 79 X10(3)/MCL (ref 130–400)
PMV BLD AUTO: 11.3 FL (ref 7.4–10.4)
POTASSIUM SERPL-SCNC: 4.3 MMOL/L (ref 3.5–5.1)
PROT SERPL-MCNC: 6.5 GM/DL (ref 6.4–8.3)
RBC # BLD AUTO: 4.52 X10(6)/MCL (ref 4–5.2)
SODIUM SERPL-SCNC: 144 MMOL/L (ref 136–145)
T PALLIDUM AB SER QL: NONREACTIVE
WBC # SPEC AUTO: 2.4 X10(3)/MCL (ref 4.5–11)

## 2020-12-17 LAB
ABS NEUT (OLG): 3.24 X10(3)/MCL (ref 2.1–9.2)
ALBUMIN SERPL-MCNC: 3 GM/DL (ref 3.5–5)
ALBUMIN/GLOB SERPL: 0.8 RATIO (ref 1.1–2)
ALP SERPL-CCNC: 148 UNIT/L (ref 40–150)
ALT SERPL-CCNC: 22 UNIT/L (ref 0–55)
AST SERPL-CCNC: 30 UNIT/L (ref 5–34)
BASOPHILS NFR BLD MANUAL: 0 %
BILIRUB SERPL-MCNC: 0.4 MG/DL
BILIRUBIN DIRECT+TOT PNL SERPL-MCNC: 0.2 MG/DL (ref 0–0.5)
BILIRUBIN DIRECT+TOT PNL SERPL-MCNC: 0.2 MG/DL (ref 0–0.8)
BUN SERPL-MCNC: 36 MG/DL (ref 9.8–20.1)
BUN SERPL-MCNC: 36 MG/DL (ref 9.8–20.1)
CALCIUM SERPL-MCNC: 9 MG/DL (ref 8.4–10.2)
CALCIUM SERPL-MCNC: 9.1 MG/DL (ref 8.4–10.2)
CHLORIDE SERPL-SCNC: 113 MMOL/L (ref 98–107)
CHLORIDE SERPL-SCNC: 114 MMOL/L (ref 98–107)
CO2 SERPL-SCNC: 22 MMOL/L (ref 22–29)
CO2 SERPL-SCNC: 24 MMOL/L (ref 22–29)
CREAT SERPL-MCNC: 0.85 MG/DL (ref 0.55–1.02)
CREAT SERPL-MCNC: 0.91 MG/DL (ref 0.55–1.02)
CREAT/UREA NIT SERPL: 42
CRP SERPL-MCNC: 2.15 MG/DL
D DIMER PPP IA.FEU-MCNC: 1.01 MCG/ML FEU
DACRYOCYTES BLD QL SMEAR: ABNORMAL
EOSINOPHIL NFR BLD MANUAL: 0 %
ERYTHROCYTE [DISTWIDTH] IN BLOOD BY AUTOMATED COUNT: 13.6 % (ref 11.5–14.5)
ERYTHROCYTE [SEDIMENTATION RATE] IN BLOOD: 25 MM/HR (ref 0–20)
FERRITIN SERPL-MCNC: 182.92 NG/ML (ref 4.63–204)
FINAL CULTURE: NORMAL
FINAL CULTURE: NORMAL
GLOBULIN SER-MCNC: 3.6 GM/DL (ref 2.4–3.5)
GLUCOSE SERPL-MCNC: 331 MG/DL (ref 74–100)
GLUCOSE SERPL-MCNC: 335 MG/DL (ref 74–100)
GRANULOCYTES NFR BLD MANUAL: 90 % (ref 43–75)
HCT VFR BLD AUTO: 36.9 % (ref 35–46)
HGB BLD-MCNC: 11.8 GM/DL (ref 12–16)
LDH SERPL-CCNC: 619 UNIT/L (ref 140–271)
LYMPHOCYTES NFR BLD MANUAL: 8 % (ref 20.5–51.1)
MCH RBC QN AUTO: 26 PG (ref 26–34)
MCHC RBC AUTO-ENTMCNC: 32 GM/DL (ref 31–37)
MCV RBC AUTO: 81.3 FL (ref 80–100)
MONOCYTES NFR BLD MANUAL: 2 % (ref 2–9)
PLATELET # BLD AUTO: 104 X10(3)/MCL (ref 130–400)
PLATELET # BLD EST: ABNORMAL 10*3/UL
PMV BLD AUTO: 10.9 FL (ref 7.4–10.4)
POIKILOCYTOSIS BLD QL SMEAR: ABNORMAL
POTASSIUM SERPL-SCNC: 4 MMOL/L (ref 3.5–5.1)
POTASSIUM SERPL-SCNC: 4.1 MMOL/L (ref 3.5–5.1)
PROT SERPL-MCNC: 6.6 GM/DL (ref 6.4–8.3)
RBC # BLD AUTO: 4.54 X10(6)/MCL (ref 4–5.2)
RBC MORPH BLD: ABNORMAL
SCHISTOCYTES BLD QL AUTO: ABNORMAL
SODIUM SERPL-SCNC: 147 MMOL/L (ref 136–145)
SODIUM SERPL-SCNC: 148 MMOL/L (ref 136–145)
WBC # SPEC AUTO: 3.8 X10(3)/MCL (ref 4.5–11)

## 2020-12-18 LAB
ABS NEUT (OLG): 2 X10(3)/MCL (ref 2.1–9.2)
ALBUMIN SERPL-MCNC: 2.8 GM/DL (ref 3.5–5)
ALBUMIN/GLOB SERPL: 0.9 RATIO (ref 1.1–2)
ALP SERPL-CCNC: 130 UNIT/L (ref 40–150)
ALT SERPL-CCNC: 20 UNIT/L (ref 0–55)
AST SERPL-CCNC: 32 UNIT/L (ref 5–34)
BILIRUB SERPL-MCNC: 0.5 MG/DL
BILIRUBIN DIRECT+TOT PNL SERPL-MCNC: 0.1 MG/DL (ref 0–0.8)
BILIRUBIN DIRECT+TOT PNL SERPL-MCNC: 0.4 MG/DL (ref 0–0.5)
BUN SERPL-MCNC: 34 MG/DL (ref 9.8–20.1)
CALCIUM SERPL-MCNC: 8.7 MG/DL (ref 8.4–10.2)
CHLORIDE SERPL-SCNC: 115 MMOL/L (ref 98–107)
CO2 SERPL-SCNC: 22 MMOL/L (ref 22–29)
CREAT SERPL-MCNC: 0.81 MG/DL (ref 0.55–1.02)
CRP SERPL-MCNC: 0.98 MG/DL
D DIMER PPP IA.FEU-MCNC: 0.64 MCG/ML FEU
ERYTHROCYTE [DISTWIDTH] IN BLOOD BY AUTOMATED COUNT: 13.6 % (ref 11.5–14.5)
ERYTHROCYTE [SEDIMENTATION RATE] IN BLOOD: 15 MM/HR (ref 0–20)
FERRITIN SERPL-MCNC: 211.41 NG/ML (ref 4.63–204)
GLOBULIN SER-MCNC: 3.1 GM/DL (ref 2.4–3.5)
GLUCOSE SERPL-MCNC: 284 MG/DL (ref 74–100)
HCT VFR BLD AUTO: 34.3 % (ref 35–46)
HGB BLD-MCNC: 10.9 GM/DL (ref 12–16)
IMM GRANULOCYTES # BLD AUTO: 0.01 10*3/UL
IMM GRANULOCYTES NFR BLD AUTO: 0 %
LDH SERPL-CCNC: 579 UNIT/L (ref 140–271)
LYMPHOCYTES # BLD AUTO: 0.2 X10(3)/MCL (ref 0.6–4.6)
LYMPHOCYTES NFR BLD AUTO: 10 %
MCH RBC QN AUTO: 25.2 PG (ref 26–34)
MCHC RBC AUTO-ENTMCNC: 31.8 GM/DL (ref 31–37)
MCV RBC AUTO: 79.4 FL (ref 80–100)
MONOCYTES # BLD AUTO: 0.1 X10(3)/MCL (ref 0.1–1.3)
MONOCYTES NFR BLD AUTO: 5 %
NEUTROPHILS # BLD AUTO: 2 X10(3)/MCL (ref 2.1–9.2)
NEUTROPHILS NFR BLD AUTO: 84 %
PLATELET # BLD AUTO: 73 X10(3)/MCL (ref 130–400)
PMV BLD AUTO: 9.9 FL (ref 7.4–10.4)
POTASSIUM SERPL-SCNC: 4 MMOL/L (ref 3.5–5.1)
PROT SERPL-MCNC: 5.9 GM/DL (ref 6.4–8.3)
RBC # BLD AUTO: 4.32 X10(6)/MCL (ref 4–5.2)
SODIUM SERPL-SCNC: 147 MMOL/L (ref 136–145)
WBC # SPEC AUTO: 2.4 X10(3)/MCL (ref 4.5–11)

## 2020-12-19 LAB
ABS NEUT (OLG): 2.88 X10(3)/MCL (ref 2.1–9.2)
ALBUMIN SERPL-MCNC: 2.7 GM/DL (ref 3.5–5)
ALBUMIN/GLOB SERPL: 0.9 RATIO (ref 1.1–2)
ALP SERPL-CCNC: 123 UNIT/L (ref 40–150)
ALT SERPL-CCNC: 22 UNIT/L (ref 0–55)
AST SERPL-CCNC: 40 UNIT/L (ref 5–34)
BILIRUB SERPL-MCNC: 0.5 MG/DL
BILIRUBIN DIRECT+TOT PNL SERPL-MCNC: 0.2 MG/DL (ref 0–0.8)
BILIRUBIN DIRECT+TOT PNL SERPL-MCNC: 0.3 MG/DL (ref 0–0.5)
BUN SERPL-MCNC: 34 MG/DL (ref 9.8–20.1)
CALCIUM SERPL-MCNC: 8.6 MG/DL (ref 8.4–10.2)
CHLORIDE SERPL-SCNC: 113 MMOL/L (ref 98–107)
CO2 SERPL-SCNC: 22 MMOL/L (ref 22–29)
CREAT SERPL-MCNC: 0.78 MG/DL (ref 0.55–1.02)
CRP SERPL-MCNC: 0.53 MG/DL
D DIMER PPP IA.FEU-MCNC: 0.79 MCG/ML FEU
ERYTHROCYTE [DISTWIDTH] IN BLOOD BY AUTOMATED COUNT: 13.6 % (ref 11.5–14.5)
ERYTHROCYTE [SEDIMENTATION RATE] IN BLOOD: 12 MM/HR (ref 0–20)
FERRITIN SERPL-MCNC: 247.77 NG/ML (ref 4.63–204)
GLOBULIN SER-MCNC: 3 GM/DL (ref 2.4–3.5)
GLUCOSE SERPL-MCNC: 338 MG/DL (ref 74–100)
HCT VFR BLD AUTO: 33.7 % (ref 35–46)
HGB BLD-MCNC: 10.9 GM/DL (ref 12–16)
IMM GRANULOCYTES # BLD AUTO: 0.03 10*3/UL
IMM GRANULOCYTES NFR BLD AUTO: 1 %
LDH SERPL-CCNC: 696 UNIT/L (ref 140–271)
LYMPHOCYTES # BLD AUTO: 0.2 X10(3)/MCL (ref 0.6–4.6)
LYMPHOCYTES NFR BLD AUTO: 5 %
MCH RBC QN AUTO: 26.3 PG (ref 26–34)
MCHC RBC AUTO-ENTMCNC: 32.3 GM/DL (ref 31–37)
MCV RBC AUTO: 81.2 FL (ref 80–100)
MONOCYTES # BLD AUTO: 0.2 X10(3)/MCL (ref 0.1–1.3)
MONOCYTES NFR BLD AUTO: 6 %
NEUTROPHILS # BLD AUTO: 2.88 X10(3)/MCL (ref 2.1–9.2)
NEUTROPHILS NFR BLD AUTO: 88 %
PLATELET # BLD AUTO: 98 X10(3)/MCL (ref 130–400)
PMV BLD AUTO: 10.7 FL (ref 7.4–10.4)
POTASSIUM SERPL-SCNC: 3.9 MMOL/L (ref 3.5–5.1)
PROT SERPL-MCNC: 5.7 GM/DL (ref 6.4–8.3)
RBC # BLD AUTO: 4.15 X10(6)/MCL (ref 4–5.2)
SODIUM SERPL-SCNC: 146 MMOL/L (ref 136–145)
WBC # SPEC AUTO: 3.3 X10(3)/MCL (ref 4.5–11)

## 2020-12-20 LAB
ABS NEUT (OLG): 3.38 X10(3)/MCL (ref 2.1–9.2)
ALBUMIN SERPL-MCNC: 2.9 GM/DL (ref 3.5–5)
ALBUMIN/GLOB SERPL: 0.9 RATIO (ref 1.1–2)
ALP SERPL-CCNC: 138 UNIT/L (ref 40–150)
ALT SERPL-CCNC: 23 UNIT/L (ref 0–55)
AST SERPL-CCNC: 46 UNIT/L (ref 5–34)
BILIRUB SERPL-MCNC: 0.6 MG/DL
BILIRUBIN DIRECT+TOT PNL SERPL-MCNC: 0.3 MG/DL (ref 0–0.5)
BILIRUBIN DIRECT+TOT PNL SERPL-MCNC: 0.3 MG/DL (ref 0–0.8)
BUN SERPL-MCNC: 28 MG/DL (ref 9.8–20.1)
CALCIUM SERPL-MCNC: 8.7 MG/DL (ref 8.4–10.2)
CHLORIDE SERPL-SCNC: 109 MMOL/L (ref 98–107)
CO2 SERPL-SCNC: 24 MMOL/L (ref 22–29)
CREAT SERPL-MCNC: 0.74 MG/DL (ref 0.55–1.02)
ERYTHROCYTE [DISTWIDTH] IN BLOOD BY AUTOMATED COUNT: 13.5 % (ref 11.5–14.5)
GLOBULIN SER-MCNC: 3.3 GM/DL (ref 2.4–3.5)
GLUCOSE SERPL-MCNC: 282 MG/DL (ref 74–100)
HCT VFR BLD AUTO: 36.2 % (ref 35–46)
HGB BLD-MCNC: 11.6 GM/DL (ref 12–16)
IMM GRANULOCYTES # BLD AUTO: 0.03 10*3/UL
IMM GRANULOCYTES NFR BLD AUTO: 1 %
LYMPHOCYTES # BLD AUTO: 0.1 X10(3)/MCL (ref 0.6–4.6)
LYMPHOCYTES NFR BLD AUTO: 4 %
MCH RBC QN AUTO: 26 PG (ref 26–34)
MCHC RBC AUTO-ENTMCNC: 32 GM/DL (ref 31–37)
MCV RBC AUTO: 81 FL (ref 80–100)
MONOCYTES # BLD AUTO: 0.2 X10(3)/MCL (ref 0.1–1.3)
MONOCYTES NFR BLD AUTO: 5 %
NEUTROPHILS # BLD AUTO: 3.38 X10(3)/MCL (ref 2.1–9.2)
NEUTROPHILS NFR BLD AUTO: 90 %
PLATELET # BLD AUTO: 117 X10(3)/MCL (ref 130–400)
PMV BLD AUTO: 10.7 FL (ref 7.4–10.4)
POTASSIUM SERPL-SCNC: 3.7 MMOL/L (ref 3.5–5.1)
PROT SERPL-MCNC: 6.2 GM/DL (ref 6.4–8.3)
RBC # BLD AUTO: 4.47 X10(6)/MCL (ref 4–5.2)
SODIUM SERPL-SCNC: 142 MMOL/L (ref 136–145)
WBC # SPEC AUTO: 3.7 X10(3)/MCL (ref 4.5–11)

## 2020-12-21 LAB
ABS NEUT (OLG): 3.71 X10(3)/MCL (ref 2.1–9.2)
ALBUMIN SERPL-MCNC: 2.6 GM/DL (ref 3.5–5)
ALBUMIN/GLOB SERPL: 0.9 RATIO (ref 1.1–2)
ALP SERPL-CCNC: 126 UNIT/L (ref 40–150)
ALT SERPL-CCNC: 25 UNIT/L (ref 0–55)
AST SERPL-CCNC: 42 UNIT/L (ref 5–34)
BILIRUB SERPL-MCNC: 0.6 MG/DL
BILIRUBIN DIRECT+TOT PNL SERPL-MCNC: 0.3 MG/DL (ref 0–0.5)
BILIRUBIN DIRECT+TOT PNL SERPL-MCNC: 0.3 MG/DL (ref 0–0.8)
BUN SERPL-MCNC: 25 MG/DL (ref 9.8–20.1)
CALCIUM SERPL-MCNC: 8.4 MG/DL (ref 8.4–10.2)
CHLORIDE SERPL-SCNC: 107 MMOL/L (ref 98–107)
CO2 SERPL-SCNC: 26 MMOL/L (ref 22–29)
CREAT SERPL-MCNC: 0.67 MG/DL (ref 0.55–1.02)
CRP SERPL-MCNC: 1.16 MG/DL
D DIMER PPP IA.FEU-MCNC: 0.82 MCG/ML FEU
ERYTHROCYTE [DISTWIDTH] IN BLOOD BY AUTOMATED COUNT: 13.6 % (ref 11.5–14.5)
ERYTHROCYTE [SEDIMENTATION RATE] IN BLOOD: 16 MM/HR (ref 0–20)
FERRITIN SERPL-MCNC: 339.92 NG/ML (ref 4.63–204)
GLOBULIN SER-MCNC: 3 GM/DL (ref 2.4–3.5)
GLUCOSE SERPL-MCNC: 287 MG/DL (ref 74–100)
HCO3 UR-SCNC: 29.5 MMOL/L (ref 22–26)
HCT VFR BLD AUTO: 33.5 % (ref 35–46)
HGB BLD-MCNC: 11 GM/DL (ref 12–16)
IMM GRANULOCYTES # BLD AUTO: 0.02 10*3/UL
IMM GRANULOCYTES NFR BLD AUTO: 0 %
LDH SERPL-CCNC: 883 UNIT/L (ref 140–271)
LYMPHOCYTES # BLD AUTO: 0.1 X10(3)/MCL (ref 0.6–4.6)
LYMPHOCYTES NFR BLD AUTO: 3 %
MCH RBC QN AUTO: 26.1 PG (ref 26–34)
MCHC RBC AUTO-ENTMCNC: 32.8 GM/DL (ref 31–37)
MCV RBC AUTO: 79.4 FL (ref 80–100)
MONOCYTES # BLD AUTO: 0.2 X10(3)/MCL (ref 0.1–1.3)
MONOCYTES NFR BLD AUTO: 4 %
NEUTROPHILS # BLD AUTO: 3.71 X10(3)/MCL (ref 2.1–9.2)
NEUTROPHILS NFR BLD AUTO: 92 %
O2 HGB ARTERIAL: 78.3 % (ref 94–100)
PCO2 BLDA: 51 MMHG (ref 35–45)
PH SMN: 7.37 [PH] (ref 7.35–7.45)
PLATELET # BLD AUTO: 94 X10(3)/MCL (ref 130–400)
PMV BLD AUTO: 11 FL (ref 7.4–10.4)
PO2 BLDA: 45 MMHG (ref 80–100)
POC ALLENS TEST: POSITIVE
POC BE: 3.3 (ref -2–2)
POC CO HGB: 2 %
POC CO2: 31.1 MMOL/L (ref 22–26)
POC MET HGB: 0.9 % (ref 0.4–1.5)
POC SAMPLESOURCE: ABNORMAL
POC SATURATED O2: 80.6 %
POC SITE: ABNORMAL
POC THB: 11.4 GM/DL (ref 12–16)
POC TREATMENT: ABNORMAL
POTASSIUM SERPL-SCNC: 4.1 MMOL/L (ref 3.5–5.1)
PROT SERPL-MCNC: 5.6 GM/DL (ref 6.4–8.3)
RBC # BLD AUTO: 4.22 X10(6)/MCL (ref 4–5.2)
SETTING 1 ABG: ABNORMAL
SETTING 2 ABG: ABNORMAL
SETTING 3 ABG: ABNORMAL
SODIUM SERPL-SCNC: 139 MMOL/L (ref 136–145)
WBC # SPEC AUTO: 4 X10(3)/MCL (ref 4.5–11)

## 2020-12-22 LAB
ABS NEUT (OLG): 5.53 X10(3)/MCL (ref 2.1–9.2)
ALBUMIN SERPL-MCNC: 2.6 GM/DL (ref 3.5–5)
ALBUMIN/GLOB SERPL: 0.7 RATIO (ref 1.1–2)
ALP SERPL-CCNC: 139 UNIT/L (ref 40–150)
ALT SERPL-CCNC: 27 UNIT/L (ref 0–55)
AST SERPL-CCNC: 65 UNIT/L (ref 5–34)
BILIRUB SERPL-MCNC: 0.5 MG/DL
BILIRUBIN DIRECT+TOT PNL SERPL-MCNC: 0.2 MG/DL (ref 0–0.5)
BILIRUBIN DIRECT+TOT PNL SERPL-MCNC: 0.3 MG/DL (ref 0–0.8)
BUN SERPL-MCNC: 26 MG/DL (ref 9.8–20.1)
CALCIUM SERPL-MCNC: 8.4 MG/DL (ref 8.4–10.2)
CHLORIDE SERPL-SCNC: 109 MMOL/L (ref 98–107)
CO2 SERPL-SCNC: 26 MMOL/L (ref 22–29)
CREAT SERPL-MCNC: 0.72 MG/DL (ref 0.55–1.02)
ERYTHROCYTE [DISTWIDTH] IN BLOOD BY AUTOMATED COUNT: 14 % (ref 11.5–14.5)
GLOBULIN SER-MCNC: 3.5 GM/DL (ref 2.4–3.5)
GLUCOSE SERPL-MCNC: 315 MG/DL (ref 74–100)
HCO3 UR-SCNC: 30.4 MMOL/L (ref 22–26)
HCO3 UR-SCNC: 30.7 MMOL/L (ref 22–26)
HCO3 UR-SCNC: 31.7 MMOL/L (ref 22–26)
HCO3 UR-SCNC: 31.9 MMOL/L (ref 22–26)
HCO3 UR-SCNC: ABNORMAL MMOL/L (ref 22–26)
HCT VFR BLD AUTO: 35.8 % (ref 35–46)
HGB BLD-MCNC: 11.7 GM/DL (ref 12–16)
IMM GRANULOCYTES # BLD AUTO: 0.05 10*3/UL
IMM GRANULOCYTES NFR BLD AUTO: 1 %
LYMPHOCYTES # BLD AUTO: 0.1 X10(3)/MCL (ref 0.6–4.6)
LYMPHOCYTES NFR BLD AUTO: 2 %
MCH RBC QN AUTO: 26.7 PG (ref 26–34)
MCHC RBC AUTO-ENTMCNC: 32.7 GM/DL (ref 31–37)
MCV RBC AUTO: 81.7 FL (ref 80–100)
MONOCYTES # BLD AUTO: 0.3 X10(3)/MCL (ref 0.1–1.3)
MONOCYTES NFR BLD AUTO: 5 %
NEUTROPHILS # BLD AUTO: 5.53 X10(3)/MCL (ref 2.1–9.2)
NEUTROPHILS NFR BLD AUTO: 93 %
O2 HGB ARTERIAL: 92.6 % (ref 94–100)
O2 HGB ARTERIAL: 92.7 % (ref 94–100)
O2 HGB ARTERIAL: 93.6 % (ref 94–100)
O2 HGB ARTERIAL: 96.2 % (ref 94–100)
O2 HGB ARTERIAL: 96.4 % (ref 94–100)
PCO2 BLDA: 78 MMHG (ref 35–45)
PCO2 BLDA: 85 MMHG (ref 35–45)
PCO2 BLDA: 88 MMHG (ref 35–45)
PCO2 BLDA: 98 MMHG (ref 35–45)
PCO2 BLDA: >125 MMHG (ref 35–45)
PH SMN: 7.02 [PH] (ref 7.35–7.45)
PH SMN: 7.1 [PH] (ref 7.35–7.45)
PH SMN: 7.15 [PH] (ref 7.35–7.45)
PH SMN: 7.18 [PH] (ref 7.35–7.45)
PH SMN: 7.22 [PH] (ref 7.35–7.45)
PLATELET # BLD AUTO: 97 X10(3)/MCL (ref 130–400)
PMV BLD AUTO: 11.7 FL (ref 7.4–10.4)
PO2 BLDA: 123 MMHG (ref 80–100)
PO2 BLDA: 125 MMHG (ref 80–100)
PO2 BLDA: 76 MMHG (ref 80–100)
PO2 BLDA: 85 MMHG (ref 80–100)
PO2 BLDA: 86 MMHG (ref 80–100)
POC ALLENS TEST: POSITIVE
POC BE: -0.4 (ref -2–2)
POC BE: -1.4 (ref -2–2)
POC BE: 1.3 (ref -2–2)
POC BE: 2.6 (ref -2–2)
POC BE: ABNORMAL (ref -2–2)
POC CO HGB: 2 %
POC CO HGB: 2.2 %
POC CO HGB: 2.3 %
POC CO HGB: 2.3 %
POC CO HGB: 2.6 %
POC CO2: 33.4 MMOL/L (ref 22–26)
POC CO2: 33.4 MMOL/L (ref 22–26)
POC CO2: 34.3 MMOL/L (ref 22–26)
POC CO2: 34.3 MMOL/L (ref 22–26)
POC CO2: ABNORMAL MMOL/L (ref 22–26)
POC MET HGB: 0.6 % (ref 0.4–1.5)
POC MET HGB: 0.8 % (ref 0.4–1.5)
POC MET HGB: 1 % (ref 0.4–1.5)
POC MET HGB: 1 % (ref 0.4–1.5)
POC MET HGB: 1.2 % (ref 0.4–1.5)
POC SAMPLESOURCE: ABNORMAL
POC SATURATED O2: 95.9 %
POC SATURATED O2: 95.9 %
POC SATURATED O2: 96.7 %
POC SATURATED O2: 99.1 %
POC SATURATED O2: 99.7 %
POC SITE: ABNORMAL
POC THB: 10.5 GM/DL (ref 12–16)
POC THB: 11.5 GM/DL (ref 12–16)
POC THB: 11.7 GM/DL (ref 12–16)
POC THB: 12.6 GM/DL (ref 12–16)
POC THB: 12.7 GM/DL (ref 12–16)
POC TREATMENT: ABNORMAL
POTASSIUM SERPL-SCNC: 4.1 MMOL/L (ref 3.5–5.1)
PROT SERPL-MCNC: 6.1 GM/DL (ref 6.4–8.3)
RBC # BLD AUTO: 4.38 X10(6)/MCL (ref 4–5.2)
SETTING 1 ABG: ABNORMAL
SETTING 2 ABG: ABNORMAL
SETTING 3 ABG: ABNORMAL
SETTING 4 ABG: ABNORMAL
SODIUM SERPL-SCNC: 142 MMOL/L (ref 136–145)
TRIGL SERPL-MCNC: 536 MG/DL (ref 37–140)
WBC # SPEC AUTO: 6 X10(3)/MCL (ref 4.5–11)

## 2020-12-23 LAB
ABS NEUT (OLG): 7.26 X10(3)/MCL (ref 2.1–9.2)
ALBUMIN SERPL-MCNC: 2.3 GM/DL (ref 3.5–5)
ALBUMIN/GLOB SERPL: 0.6 RATIO (ref 1.1–2)
ALP SERPL-CCNC: 119 UNIT/L (ref 40–150)
ALT SERPL-CCNC: 29 UNIT/L (ref 0–55)
AST SERPL-CCNC: 25 UNIT/L (ref 5–34)
BILIRUB SERPL-MCNC: 0.4 MG/DL
BILIRUBIN DIRECT+TOT PNL SERPL-MCNC: 0.2 MG/DL (ref 0–0.5)
BILIRUBIN DIRECT+TOT PNL SERPL-MCNC: 0.2 MG/DL (ref 0–0.8)
BUN SERPL-MCNC: 33 MG/DL (ref 9.8–20.1)
CALCIUM SERPL-MCNC: 8.7 MG/DL (ref 8.4–10.2)
CHLORIDE SERPL-SCNC: 110 MMOL/L (ref 98–107)
CO2 SERPL-SCNC: 30 MMOL/L (ref 22–29)
CREAT SERPL-MCNC: 0.71 MG/DL (ref 0.55–1.02)
CRP SERPL-MCNC: 4.11 MG/DL
D DIMER PPP IA.FEU-MCNC: 1.24 MCG/ML FEU
ERYTHROCYTE [DISTWIDTH] IN BLOOD BY AUTOMATED COUNT: 14 % (ref 11.5–14.5)
ERYTHROCYTE [SEDIMENTATION RATE] IN BLOOD: 19 MM/HR (ref 0–20)
FERRITIN SERPL-MCNC: 279.36 NG/ML (ref 4.63–204)
GLOBULIN SER-MCNC: 3.6 GM/DL (ref 2.4–3.5)
GLUCOSE SERPL-MCNC: 270 MG/DL (ref 74–100)
HCO3 UR-SCNC: 38.6 MMOL/L (ref 22–26)
HCO3 UR-SCNC: 39.2 MMOL/L (ref 22–26)
HCO3 UR-SCNC: 39.6 MMOL/L (ref 22–26)
HCT VFR BLD AUTO: 35.3 % (ref 35–46)
HGB BLD-MCNC: 11.7 GM/DL (ref 12–16)
IMM GRANULOCYTES # BLD AUTO: 0.1 10*3/UL
IMM GRANULOCYTES NFR BLD AUTO: 1 %
LDH SERPL-CCNC: 561 UNIT/L (ref 140–271)
LYMPHOCYTES # BLD AUTO: 0 X10(3)/MCL (ref 0.6–4.6)
LYMPHOCYTES NFR BLD AUTO: 1 %
MCH RBC QN AUTO: 27 PG (ref 26–34)
MCHC RBC AUTO-ENTMCNC: 33.1 GM/DL (ref 31–37)
MCV RBC AUTO: 81.5 FL (ref 80–100)
MONOCYTES # BLD AUTO: 0.2 X10(3)/MCL (ref 0.1–1.3)
MONOCYTES NFR BLD AUTO: 3 %
NEUTROPHILS # BLD AUTO: 7.26 X10(3)/MCL (ref 2.1–9.2)
NEUTROPHILS NFR BLD AUTO: 95 %
O2 HGB ARTERIAL: 88.8 % (ref 94–100)
O2 HGB ARTERIAL: 89.9 % (ref 94–100)
O2 HGB ARTERIAL: 92.5 % (ref 94–100)
PCO2 BLDA: 67 MMHG (ref 35–45)
PCO2 BLDA: 75 MMHG (ref 35–45)
PCO2 BLDA: 76 MMHG (ref 35–45)
PH SMN: 7.32 [PH] (ref 7.35–7.45)
PH SMN: 7.32 [PH] (ref 7.35–7.45)
PH SMN: 7.38 [PH] (ref 7.35–7.45)
PLATELET # BLD AUTO: 128 X10(3)/MCL (ref 130–400)
PMV BLD AUTO: 12.6 FL (ref 7.4–10.4)
PO2 BLDA: 53 MMHG (ref 80–100)
PO2 BLDA: 57 MMHG (ref 80–100)
PO2 BLDA: 64 MMHG (ref 80–100)
POC ALLENS TEST: POSITIVE
POC BE: 10 (ref -2–2)
POC BE: 10.4 (ref -2–2)
POC BE: 12.2 (ref -2–2)
POC CO HGB: 2 %
POC CO HGB: 2.1 %
POC CO HGB: 2.2 %
POC CO2: 40.9 MMOL/L (ref 22–26)
POC CO2: 41.5 MMOL/L (ref 22–26)
POC CO2: 41.7 MMOL/L (ref 22–26)
POC MET HGB: 0.7 % (ref 0.4–1.5)
POC MET HGB: 0.8 % (ref 0.4–1.5)
POC MET HGB: 0.9 % (ref 0.4–1.5)
POC SAMPLESOURCE: ABNORMAL
POC SATURATED O2: 91.5 %
POC SATURATED O2: 92.5 %
POC SATURATED O2: 95.3 %
POC SITE: ABNORMAL
POC THB: 10.6 GM/DL (ref 12–16)
POC THB: 11.5 GM/DL (ref 12–16)
POC THB: 11.9 GM/DL (ref 12–16)
POC TREATMENT: ABNORMAL
POTASSIUM SERPL-SCNC: 4.3 MMOL/L (ref 3.5–5.1)
PROT SERPL-MCNC: 5.9 GM/DL (ref 6.4–8.3)
RBC # BLD AUTO: 4.33 X10(6)/MCL (ref 4–5.2)
SETTING 1 ABG: ABNORMAL
SETTING 2 ABG: ABNORMAL
SETTING 3 ABG: ABNORMAL
SETTING 4 ABG: ABNORMAL
SODIUM SERPL-SCNC: 148 MMOL/L (ref 136–145)
TRIGL SERPL-MCNC: 555 MG/DL (ref 37–140)
WBC # SPEC AUTO: 7.6 X10(3)/MCL (ref 4.5–11)

## 2020-12-24 LAB
ABS NEUT (OLG): 5.47 X10(3)/MCL (ref 2.1–9.2)
ALBUMIN SERPL-MCNC: 2.1 GM/DL (ref 3.5–5)
ALBUMIN/GLOB SERPL: 0.7 RATIO (ref 1.1–2)
ALP SERPL-CCNC: 126 UNIT/L (ref 40–150)
ALT SERPL-CCNC: 51 UNIT/L (ref 0–55)
AST SERPL-CCNC: 49 UNIT/L (ref 5–34)
BILIRUB SERPL-MCNC: 0.4 MG/DL
BILIRUBIN DIRECT+TOT PNL SERPL-MCNC: 0.2 MG/DL (ref 0–0.5)
BILIRUBIN DIRECT+TOT PNL SERPL-MCNC: 0.2 MG/DL (ref 0–0.8)
BUN SERPL-MCNC: 28 MG/DL (ref 9.8–20.1)
BUN SERPL-MCNC: 35 MG/DL (ref 9.8–20.1)
CALCIUM SERPL-MCNC: 7.9 MG/DL (ref 8.4–10.2)
CALCIUM SERPL-MCNC: 8.3 MG/DL (ref 8.4–10.2)
CHLORIDE SERPL-SCNC: 106 MMOL/L (ref 98–107)
CHLORIDE SERPL-SCNC: 111 MMOL/L (ref 98–107)
CO2 SERPL-SCNC: 34 MMOL/L (ref 22–29)
CO2 SERPL-SCNC: 36 MMOL/L (ref 22–29)
CREAT SERPL-MCNC: 0.72 MG/DL (ref 0.55–1.02)
CREAT SERPL-MCNC: 0.84 MG/DL (ref 0.55–1.02)
CREAT/UREA NIT SERPL: 42
EOSINOPHIL # BLD AUTO: 0 X10(3)/MCL (ref 0–0.9)
EOSINOPHIL NFR BLD AUTO: 0 %
ERYTHROCYTE [DISTWIDTH] IN BLOOD BY AUTOMATED COUNT: 14.6 % (ref 11.5–14.5)
GLOBULIN SER-MCNC: 3.2 GM/DL (ref 2.4–3.5)
GLUCOSE SERPL-MCNC: 210 MG/DL (ref 74–100)
GLUCOSE SERPL-MCNC: 385 MG/DL (ref 74–100)
HCO3 UR-SCNC: 45.1 MMOL/L (ref 22–26)
HCT VFR BLD AUTO: 32.8 % (ref 35–46)
HGB BLD-MCNC: 10.7 GM/DL (ref 12–16)
IMM GRANULOCYTES # BLD AUTO: 0.07 10*3/UL
IMM GRANULOCYTES NFR BLD AUTO: 1 %
LYMPHOCYTES # BLD AUTO: 0.1 X10(3)/MCL (ref 0.6–4.6)
LYMPHOCYTES NFR BLD AUTO: 2 %
MCH RBC QN AUTO: 27.1 PG (ref 26–34)
MCHC RBC AUTO-ENTMCNC: 32.6 GM/DL (ref 31–37)
MCV RBC AUTO: 83 FL (ref 80–100)
MONOCYTES # BLD AUTO: 0.2 X10(3)/MCL (ref 0.1–1.3)
MONOCYTES NFR BLD AUTO: 3 %
NEUTROPHILS # BLD AUTO: 5.47 X10(3)/MCL (ref 2.1–9.2)
NEUTROPHILS NFR BLD AUTO: 93 %
O2 HGB ARTERIAL: 92.4 % (ref 94–100)
PCO2 BLDA: 78 MMHG (ref 35–45)
PH SMN: 7.37 [PH] (ref 7.35–7.45)
PLATELET # BLD AUTO: 125 X10(3)/MCL (ref 130–400)
PMV BLD AUTO: 14 FL (ref 7.4–10.4)
PO2 BLDA: 66 MMHG (ref 80–100)
POC ALLENS TEST: POSITIVE
POC BE: 16.6 (ref -2–2)
POC CO HGB: 2.3 %
POC CO2: 47.5 MMOL/L (ref 22–26)
POC MET HGB: 0.9 % (ref 0.4–1.5)
POC SAMPLESOURCE: ABNORMAL
POC SATURATED O2: 95.5 %
POC SITE: ABNORMAL
POC THB: 10.8 GM/DL (ref 12–16)
POC TREATMENT: ABNORMAL
POTASSIUM SERPL-SCNC: 4.6 MMOL/L (ref 3.5–5.1)
POTASSIUM SERPL-SCNC: 4.6 MMOL/L (ref 3.5–5.1)
PROT SERPL-MCNC: 5.3 GM/DL (ref 6.4–8.3)
RBC # BLD AUTO: 3.95 X10(6)/MCL (ref 4–5.2)
SETTING 1 ABG: ABNORMAL
SETTING 2 ABG: ABNORMAL
SETTING 3 ABG: ABNORMAL
SETTING 4 ABG: ABNORMAL
SODIUM SERPL-SCNC: 148 MMOL/L (ref 136–145)
SODIUM SERPL-SCNC: 153 MMOL/L (ref 136–145)
TRIGL SERPL-MCNC: 401 MG/DL (ref 37–140)
WBC # SPEC AUTO: 5.8 X10(3)/MCL (ref 4.5–11)

## 2020-12-25 LAB
ABS NEUT (OLG): 2.35 X10(3)/MCL (ref 2.1–9.2)
ALBUMIN SERPL-MCNC: 1.9 GM/DL (ref 3.5–5)
ALBUMIN/GLOB SERPL: 0.7 RATIO (ref 1.1–2)
ALP SERPL-CCNC: 96 UNIT/L (ref 40–150)
ALT SERPL-CCNC: 42 UNIT/L (ref 0–55)
AST SERPL-CCNC: 28 UNIT/L (ref 5–34)
BILIRUB SERPL-MCNC: 0.3 MG/DL
BILIRUBIN DIRECT+TOT PNL SERPL-MCNC: 0.1 MG/DL (ref 0–0.8)
BILIRUBIN DIRECT+TOT PNL SERPL-MCNC: 0.2 MG/DL (ref 0–0.5)
BUN SERPL-MCNC: 29 MG/DL (ref 9.8–20.1)
CALCIUM SERPL-MCNC: 7.9 MG/DL (ref 8.4–10.2)
CHLORIDE SERPL-SCNC: 104 MMOL/L (ref 98–107)
CO2 SERPL-SCNC: 37 MMOL/L (ref 22–29)
CREAT SERPL-MCNC: 0.7 MG/DL (ref 0.55–1.02)
CRP SERPL-MCNC: 2.05 MG/DL
D DIMER PPP IA.FEU-MCNC: 1.51 MCG/ML FEU
EOSINOPHIL # BLD AUTO: 0 X10(3)/MCL (ref 0–0.9)
EOSINOPHIL NFR BLD AUTO: 0 %
ERYTHROCYTE [DISTWIDTH] IN BLOOD BY AUTOMATED COUNT: 14.2 % (ref 11.5–14.5)
ERYTHROCYTE [SEDIMENTATION RATE] IN BLOOD: 34 MM/HR (ref 0–20)
FERRITIN SERPL-MCNC: 249.38 NG/ML (ref 4.63–204)
GLOBULIN SER-MCNC: 2.8 GM/DL (ref 2.4–3.5)
GLUCOSE SERPL-MCNC: 371 MG/DL (ref 74–100)
HCO3 UR-SCNC: 44.8 MMOL/L (ref 22–26)
HCT VFR BLD AUTO: 28.5 % (ref 35–46)
HGB BLD-MCNC: 8.5 GM/DL (ref 12–16)
IMM GRANULOCYTES # BLD AUTO: 0.04 10*3/UL
IMM GRANULOCYTES NFR BLD AUTO: 2 %
LDH SERPL-CCNC: 363 UNIT/L (ref 140–271)
LYMPHOCYTES # BLD AUTO: 0.1 X10(3)/MCL (ref 0.6–4.6)
LYMPHOCYTES NFR BLD AUTO: 4 %
MCH RBC QN AUTO: 25.9 PG (ref 26–34)
MCHC RBC AUTO-ENTMCNC: 29.8 GM/DL (ref 31–37)
MCV RBC AUTO: 86.9 FL (ref 80–100)
MONOCYTES # BLD AUTO: 0.1 X10(3)/MCL (ref 0.1–1.3)
MONOCYTES NFR BLD AUTO: 5 %
NEUTROPHILS # BLD AUTO: 2.35 X10(3)/MCL (ref 2.1–9.2)
NEUTROPHILS NFR BLD AUTO: 89 %
O2 HGB ARTERIAL: 94.9 % (ref 94–100)
PCO2 BLDA: 87 MMHG (ref 35–45)
PH SMN: 7.32 [PH] (ref 7.35–7.45)
PLATELET # BLD AUTO: 77 X10(3)/MCL (ref 130–400)
PMV BLD AUTO: 11.9 FL (ref 7.4–10.4)
PO2 BLDA: 80 MMHG (ref 80–100)
POC ALLENS TEST: ABNORMAL
POC BE: 15.5 (ref -2–2)
POC CO HGB: 2.5 %
POC CO2: 47.5 MMOL/L (ref 22–26)
POC MET HGB: 0.7 % (ref 0.4–1.5)
POC SAMPLESOURCE: ABNORMAL
POC SATURATED O2: 97.9 %
POC SITE: ABNORMAL
POC THB: 10.4 GM/DL (ref 12–16)
POC TREATMENT: ABNORMAL
POTASSIUM SERPL-SCNC: 4.7 MMOL/L (ref 3.5–5.1)
PROT SERPL-MCNC: 4.7 GM/DL (ref 6.4–8.3)
RBC # BLD AUTO: 3.28 X10(6)/MCL (ref 4–5.2)
SETTING 1 ABG: ABNORMAL
SETTING 2 ABG: ABNORMAL
SODIUM SERPL-SCNC: 146 MMOL/L (ref 136–145)
TRIGL SERPL-MCNC: 325 MG/DL (ref 37–140)
WBC # SPEC AUTO: 2.6 X10(3)/MCL (ref 4.5–11)

## 2020-12-26 LAB
ABS NEUT (OLG): 6.23 X10(3)/MCL (ref 2.1–9.2)
ALBUMIN SERPL-MCNC: 2 GM/DL (ref 3.5–5)
ALBUMIN/GLOB SERPL: 0.6 RATIO (ref 1.1–2)
ALP SERPL-CCNC: 99 UNIT/L (ref 40–150)
ALT SERPL-CCNC: 34 UNIT/L (ref 0–55)
APPEARANCE, UA: ABNORMAL
AST SERPL-CCNC: 25 UNIT/L (ref 5–34)
BACTERIA #/AREA URNS AUTO: ABNORMAL /HPF
BASOPHILS # BLD AUTO: 0 X10(3)/MCL (ref 0–0.2)
BASOPHILS NFR BLD AUTO: 0 %
BILIRUB SERPL-MCNC: 0.3 MG/DL
BILIRUB UR QL STRIP: NEGATIVE
BILIRUBIN DIRECT+TOT PNL SERPL-MCNC: 0.1 MG/DL (ref 0–0.8)
BILIRUBIN DIRECT+TOT PNL SERPL-MCNC: 0.2 MG/DL (ref 0–0.5)
BUN SERPL-MCNC: 30 MG/DL (ref 9.8–20.1)
CALCIUM SERPL-MCNC: 8 MG/DL (ref 8.4–10.2)
CHLORIDE SERPL-SCNC: 99 MMOL/L (ref 98–107)
CO2 SERPL-SCNC: 37 MMOL/L (ref 22–29)
COLOR UR: ABNORMAL
CREAT SERPL-MCNC: 0.71 MG/DL (ref 0.55–1.02)
EOSINOPHIL # BLD AUTO: 0 X10(3)/MCL (ref 0–0.9)
EOSINOPHIL NFR BLD AUTO: 0 %
ERYTHROCYTE [DISTWIDTH] IN BLOOD BY AUTOMATED COUNT: 14 % (ref 11.5–14.5)
GLOBULIN SER-MCNC: 3.2 GM/DL (ref 2.4–3.5)
GLUCOSE (UA): 1000 MG/DL
GLUCOSE SERPL-MCNC: 357 MG/DL (ref 74–100)
HCO3 UR-SCNC: 47.3 MMOL/L (ref 22–26)
HCT VFR BLD AUTO: 29.5 % (ref 35–46)
HGB BLD-MCNC: 9.1 GM/DL (ref 12–16)
HGB UR QL STRIP: >1 MG/DL
HYALINE CASTS #/AREA URNS LPF: ABNORMAL /LPF
IMM GRANULOCYTES # BLD AUTO: 0.21 10*3/UL
IMM GRANULOCYTES NFR BLD AUTO: 3 %
KETONES UR QL STRIP: ABNORMAL
LEUKOCYTE ESTERASE UR QL STRIP: 250 LEU/UL
LYMPHOCYTES # BLD AUTO: 0.3 X10(3)/MCL (ref 0.6–4.6)
LYMPHOCYTES NFR BLD AUTO: 4 %
MCH RBC QN AUTO: 26.8 PG (ref 26–34)
MCHC RBC AUTO-ENTMCNC: 30.8 GM/DL (ref 31–37)
MCV RBC AUTO: 86.8 FL (ref 80–100)
MONOCYTES # BLD AUTO: 0.5 X10(3)/MCL (ref 0.1–1.3)
MONOCYTES NFR BLD AUTO: 7 %
NEUTROPHILS # BLD AUTO: 6.23 X10(3)/MCL (ref 2.1–9.2)
NEUTROPHILS NFR BLD AUTO: 86 %
NITRITE UR QL STRIP: NEGATIVE
O2 HGB ARTERIAL: 92 % (ref 94–100)
PCO2 BLDA: 80 MMHG (ref 35–45)
PH SMN: 7.38 [PH] (ref 7.35–7.45)
PH UR STRIP: 6 [PH] (ref 4.5–8)
PLATELET # BLD AUTO: 130 X10(3)/MCL (ref 130–400)
PMV BLD AUTO: 11.2 FL (ref 7.4–10.4)
PO2 BLDA: 67 MMHG (ref 80–100)
POC ALLENS TEST: POSITIVE
POC BE: 19.2 (ref -2–2)
POC CO HGB: 2.5 %
POC CO2: 49.8 MMOL/L (ref 22–26)
POC MET HGB: 1.3 % (ref 0.4–1.5)
POC SAMPLESOURCE: ABNORMAL
POC SATURATED O2: 95.6 %
POC SITE: ABNORMAL
POC THB: 9.2 GM/DL (ref 12–16)
POC TREATMENT: ABNORMAL
POTASSIUM SERPL-SCNC: 5 MMOL/L (ref 3.5–5.1)
PROT SERPL-MCNC: 5.2 GM/DL (ref 6.4–8.3)
PROT UR QL STRIP: 70 MG/DL
RBC # BLD AUTO: 3.4 X10(6)/MCL (ref 4–5.2)
RBC #/AREA URNS AUTO: >=100 /HPF
SETTING 1 ABG: ABNORMAL
SETTING 2 ABG: 500
SETTING 3 ABG: ABNORMAL
SETTING 4 ABG: ABNORMAL
SODIUM SERPL-SCNC: 142 MMOL/L (ref 136–145)
SP GR UR STRIP: 1.02 (ref 1–1.03)
SQUAMOUS #/AREA URNS LPF: ABNORMAL /LPF
TRIGL SERPL-MCNC: 586 MG/DL (ref 37–140)
UROBILINOGEN UR STRIP-ACNC: NORMAL
WBC # SPEC AUTO: 7.3 X10(3)/MCL (ref 4.5–11)
WBC #/AREA URNS AUTO: ABNORMAL /HPF

## 2020-12-27 LAB
ABS NEUT (OLG): 9.26 X10(3)/MCL (ref 2.1–9.2)
ALBUMIN SERPL-MCNC: 2 GM/DL (ref 3.5–5)
ALBUMIN/GLOB SERPL: 0.6 RATIO (ref 1.1–2)
ALP SERPL-CCNC: 99 UNIT/L (ref 40–150)
ALT SERPL-CCNC: 40 UNIT/L (ref 0–55)
ANISOCYTOSIS BLD QL SMEAR: ABNORMAL
AST SERPL-CCNC: 34 UNIT/L (ref 5–34)
BASOPHILS NFR BLD MANUAL: 0 %
BILIRUB SERPL-MCNC: 0.5 MG/DL
BILIRUBIN DIRECT+TOT PNL SERPL-MCNC: 0.2 MG/DL (ref 0–0.8)
BILIRUBIN DIRECT+TOT PNL SERPL-MCNC: 0.3 MG/DL (ref 0–0.5)
BUN SERPL-MCNC: 40 MG/DL (ref 9.8–20.1)
CALCIUM SERPL-MCNC: 8.3 MG/DL (ref 8.4–10.2)
CHLORIDE SERPL-SCNC: 94 MMOL/L (ref 98–107)
CO2 SERPL-SCNC: 36 MMOL/L (ref 22–29)
CREAT SERPL-MCNC: 0.81 MG/DL (ref 0.55–1.02)
CRP SERPL-MCNC: 3.88 MG/DL
D DIMER PPP IA.FEU-MCNC: 0.79 MCG/ML FEU
EOSINOPHIL NFR BLD MANUAL: 0 %
ERYTHROCYTE [DISTWIDTH] IN BLOOD BY AUTOMATED COUNT: 14.1 % (ref 11.5–14.5)
ERYTHROCYTE [SEDIMENTATION RATE] IN BLOOD: 58 MM/HR (ref 0–20)
FERRITIN SERPL-MCNC: 261.06 NG/ML (ref 4.63–204)
GLOBULIN SER-MCNC: 3.5 GM/DL (ref 2.4–3.5)
GLUCOSE SERPL-MCNC: 343 MG/DL (ref 74–100)
GRANULOCYTES NFR BLD MANUAL: 76 % (ref 43–75)
HCO3 UR-SCNC: 44.2 MMOL/L (ref 22–26)
HCT VFR BLD AUTO: 27.1 % (ref 35–46)
HGB BLD-MCNC: 8.4 GM/DL (ref 12–16)
LACTATE SERPL-SCNC: 1.3 MMOL/L (ref 0.5–2.2)
LDH SERPL-CCNC: 364 UNIT/L (ref 140–271)
LYMPHOCYTES NFR BLD MANUAL: 2 % (ref 20.5–51.1)
MACROCYTES BLD QL SMEAR: ABNORMAL
MCH RBC QN AUTO: 27 PG (ref 26–34)
MCHC RBC AUTO-ENTMCNC: 31 GM/DL (ref 31–37)
MCV RBC AUTO: 87.1 FL (ref 80–100)
METAMYELOCYTES NFR BLD MANUAL: 1 %
MONOCYTES NFR BLD MANUAL: 3 % (ref 2–9)
MYELOCYTES NFR BLD MANUAL: 6 %
NEUTS BAND NFR BLD MANUAL: 12 % (ref 0–10)
O2 HGB ARTERIAL: 92.9 % (ref 94–100)
PCO2 BLDA: 80 MMHG (ref 35–45)
PH SMN: 7.35 [PH] (ref 7.35–7.45)
PLATELET # BLD AUTO: 155 X10(3)/MCL (ref 130–400)
PLATELET # BLD EST: ADEQUATE 10*3/UL
PMV BLD AUTO: 12 FL (ref 7.4–10.4)
PO2 BLDA: 66 MMHG (ref 80–100)
POC ALLENS TEST: POSITIVE
POC BE: 16.7 (ref -2–2)
POC CO HGB: 2.8 %
POC CO2: 46.7 MMOL/L (ref 22–26)
POC MET HGB: 1.1 % (ref 0.4–1.5)
POC SAMPLESOURCE: ABNORMAL
POC SATURATED O2: 96.7 %
POC SITE: ABNORMAL
POC THB: 7 GM/DL (ref 12–16)
POC TREATMENT: ABNORMAL
POLYCHROMASIA BLD QL SMEAR: ABNORMAL
POTASSIUM SERPL-SCNC: 5.6 MMOL/L (ref 3.5–5.1)
PROT SERPL-MCNC: 5.5 GM/DL (ref 6.4–8.3)
RBC # BLD AUTO: 3.11 X10(6)/MCL (ref 4–5.2)
RBC MORPH BLD: ABNORMAL
SETTING 1 ABG: ABNORMAL
SETTING 2 ABG: ABNORMAL
SETTING 3 ABG: ABNORMAL
SODIUM SERPL-SCNC: 137 MMOL/L (ref 136–145)
TRIGL SERPL-MCNC: 763 MG/DL (ref 37–140)
WBC # SPEC AUTO: 11.4 X10(3)/MCL (ref 4.5–11)

## 2020-12-28 LAB
ABS NEUT (OLG): 18.34 X10(3)/MCL (ref 2.1–9.2)
ABS NEUT (OLG): 18.51 X10(3)/MCL (ref 2.1–9.2)
ALBUMIN SERPL-MCNC: 1.7 GM/DL (ref 3.5–5)
ALBUMIN SERPL-MCNC: 1.7 GM/DL (ref 3.5–5)
ALBUMIN/GLOB SERPL: 0.6 RATIO (ref 1.1–2)
ALBUMIN/GLOB SERPL: 0.6 RATIO (ref 1.1–2)
ALP SERPL-CCNC: 100 UNIT/L (ref 40–150)
ALP SERPL-CCNC: 92 UNIT/L (ref 40–150)
ALT SERPL-CCNC: 4221 UNIT/L (ref 0–55)
ALT SERPL-CCNC: 5887 UNIT/L (ref 0–55)
ANISOCYTOSIS BLD QL SMEAR: ABNORMAL
ANISOCYTOSIS BLD QL SMEAR: ABNORMAL
AST SERPL-CCNC: >4202 UNIT/L (ref 5–34)
AST SERPL-CCNC: ABNORMAL UNIT/L (ref 5–34)
BASOPHILS NFR BLD MANUAL: 0 %
BASOPHILS NFR BLD MANUAL: 0 %
BILIRUB SERPL-MCNC: 0.6 MG/DL
BILIRUB SERPL-MCNC: 1 MG/DL
BILIRUBIN DIRECT+TOT PNL SERPL-MCNC: 0.2 MG/DL (ref 0–0.8)
BILIRUBIN DIRECT+TOT PNL SERPL-MCNC: 0.3 MG/DL (ref 0–0.8)
BILIRUBIN DIRECT+TOT PNL SERPL-MCNC: 0.4 MG/DL (ref 0–0.5)
BILIRUBIN DIRECT+TOT PNL SERPL-MCNC: 0.7 MG/DL (ref 0–0.5)
BUN SERPL-MCNC: 71 MG/DL (ref 9.8–20.1)
BUN SERPL-MCNC: 73 MG/DL (ref 9.8–20.1)
BUN SERPL-MCNC: 80 MG/DL (ref 9.8–20.1)
CALCIUM SERPL-MCNC: 7.2 MG/DL (ref 8.4–10.2)
CALCIUM SERPL-MCNC: 7.6 MG/DL (ref 8.4–10.2)
CALCIUM SERPL-MCNC: 7.7 MG/DL (ref 8.4–10.2)
CHLORIDE SERPL-SCNC: 95 MMOL/L (ref 98–107)
CHLORIDE SERPL-SCNC: 95 MMOL/L (ref 98–107)
CHLORIDE SERPL-SCNC: 96 MMOL/L (ref 98–107)
CO2 SERPL-SCNC: 28 MMOL/L (ref 22–29)
CO2 SERPL-SCNC: 28 MMOL/L (ref 22–29)
CO2 SERPL-SCNC: 29 MMOL/L (ref 22–29)
COLOR STL: ABNORMAL
CONSISTENCY STL: ABNORMAL
CREAT SERPL-MCNC: 1.53 MG/DL (ref 0.55–1.02)
CREAT SERPL-MCNC: 1.59 MG/DL (ref 0.55–1.02)
CREAT SERPL-MCNC: 1.82 MG/DL (ref 0.55–1.02)
CREAT/UREA NIT SERPL: 44
CROSSMATCH INTERPRETATION: NORMAL
CROSSMATCH INTERPRETATION: NORMAL
EOSINOPHIL NFR BLD MANUAL: 0 %
EOSINOPHIL NFR BLD MANUAL: 0 %
ERYTHROCYTE [DISTWIDTH] IN BLOOD BY AUTOMATED COUNT: 14.8 % (ref 11.5–14.5)
ERYTHROCYTE [DISTWIDTH] IN BLOOD BY AUTOMATED COUNT: 15 % (ref 11.5–14.5)
GLOBULIN SER-MCNC: 2.7 GM/DL (ref 2.4–3.5)
GLOBULIN SER-MCNC: 2.9 GM/DL (ref 2.4–3.5)
GLUCOSE SERPL-MCNC: 190 MG/DL (ref 74–100)
GLUCOSE SERPL-MCNC: 205 MG/DL (ref 74–100)
GLUCOSE SERPL-MCNC: 354 MG/DL (ref 74–100)
GRANULOCYTES NFR BLD MANUAL: 73 % (ref 43–75)
GRANULOCYTES NFR BLD MANUAL: 75 % (ref 43–75)
HCO3 UR-SCNC: 37.4 MMOL/L (ref 22–26)
HCO3 UR-SCNC: 40.7 MMOL/L (ref 22–26)
HCO3 UR-SCNC: 42.6 MMOL/L (ref 22–26)
HCT VFR BLD AUTO: 19.4 % (ref 35–46)
HCT VFR BLD AUTO: 19.5 % (ref 35–46)
HEMOCCULT SP1 STL QL: POSITIVE
HGB BLD-MCNC: 5.8 GM/DL (ref 12–16)
HGB BLD-MCNC: 6 GM/DL (ref 12–16)
HYPOCHROMIA BLD QL SMEAR: ABNORMAL
HYPOCHROMIA BLD QL SMEAR: ABNORMAL
LYMPHOCYTES NFR BLD MANUAL: 4 % (ref 20.5–51.1)
LYMPHOCYTES NFR BLD MANUAL: 7 % (ref 20.5–51.1)
MACROCYTES BLD QL SMEAR: ABNORMAL
MACROCYTES BLD QL SMEAR: ABNORMAL
MCH RBC QN AUTO: 27.4 PG (ref 26–34)
MCH RBC QN AUTO: 27.8 PG (ref 26–34)
MCHC RBC AUTO-ENTMCNC: 29.9 GM/DL (ref 31–37)
MCHC RBC AUTO-ENTMCNC: 30.8 GM/DL (ref 31–37)
MCV RBC AUTO: 90.3 FL (ref 80–100)
MCV RBC AUTO: 91.5 FL (ref 80–100)
METAMYELOCYTES NFR BLD MANUAL: 2 %
METAMYELOCYTES NFR BLD MANUAL: 3 %
MONOCYTES NFR BLD MANUAL: 7 % (ref 2–9)
MONOCYTES NFR BLD MANUAL: 8 % (ref 2–9)
MYELOCYTES NFR BLD MANUAL: 3 %
NEUTS BAND NFR BLD MANUAL: 10 % (ref 0–10)
NEUTS BAND NFR BLD MANUAL: 8 % (ref 0–10)
NRBC BLD MANUAL-RTO: 3 %
NRBC BLD MANUAL-RTO: 9 %
O2 HGB ARTERIAL: 33.4 % (ref 94–100)
O2 HGB ARTERIAL: 91.2 % (ref 94–100)
O2 HGB ARTERIAL: ABNORMAL % (ref 94–100)
PCO2 BLDA: 71 MMHG (ref 35–45)
PCO2 BLDA: 72 MMHG (ref 35–45)
PCO2 BLDA: 95 MMHG (ref 35–45)
PH SMN: 7.24 [PH] (ref 7.35–7.45)
PH SMN: 7.33 [PH] (ref 7.35–7.45)
PH SMN: 7.38 [PH] (ref 7.35–7.45)
PLATELET # BLD AUTO: 180 X10(3)/MCL (ref 130–400)
PLATELET # BLD AUTO: 185 X10(3)/MCL (ref 130–400)
PLATELET # BLD EST: NORMAL 10*3/UL
PLATELET # BLD EST: NORMAL 10*3/UL
PMV BLD AUTO: 11.2 FL (ref 7.4–10.4)
PMV BLD AUTO: 11.7 FL (ref 7.4–10.4)
PO2 BLDA: 29 MMHG (ref 80–100)
PO2 BLDA: 49 MMHG (ref 80–100)
PO2 BLDA: 68 MMHG (ref 80–100)
POC ALLENS TEST: POSITIVE
POC BE: 10.1 (ref -2–2)
POC BE: 11.9 (ref -2–2)
POC BE: 14 (ref -2–2)
POC CO HGB: 2 %
POC CO HGB: 3.1 %
POC CO HGB: ABNORMAL %
POC CO2: 39.6 MMOL/L (ref 22–26)
POC CO2: 43.6 MMOL/L (ref 22–26)
POC CO2: 44.8 MMOL/L (ref 22–26)
POC MET HGB: 0.4 % (ref 0.4–1.5)
POC MET HGB: 1 % (ref 0.4–1.5)
POC MET HGB: ABNORMAL % (ref 0.4–1.5)
POC SAMPLESOURCE: ABNORMAL
POC SATURATED O2: 34.5 %
POC SATURATED O2: 94.5 %
POC SATURATED O2: ABNORMAL %
POC SITE: ABNORMAL
POC THB: 6.7 GM/DL (ref 12–16)
POC THB: 7.4 GM/DL (ref 12–16)
POC THB: <6.9 GM/DL (ref 12–16)
POC TREATMENT: ABNORMAL
POLYCHROMASIA BLD QL SMEAR: ABNORMAL
POLYCHROMASIA BLD QL SMEAR: ABNORMAL
POTASSIUM SERPL-SCNC: 7.2 MMOL/L (ref 3.5–5.1)
POTASSIUM SERPL-SCNC: 7.5 MMOL/L (ref 3.5–5.1)
POTASSIUM SERPL-SCNC: 7.5 MMOL/L (ref 3.5–5.1)
PRODUCT READY: NORMAL
PRODUCT READY: NORMAL
PROT SERPL-MCNC: 4.4 GM/DL (ref 6.4–8.3)
PROT SERPL-MCNC: 4.6 GM/DL (ref 6.4–8.3)
RBC # BLD AUTO: 2.12 X10(6)/MCL (ref 4–5.2)
RBC # BLD AUTO: 2.16 X10(6)/MCL (ref 4–5.2)
RBC MORPH BLD: ABNORMAL
RBC MORPH BLD: ABNORMAL
SETTING 1 ABG: ABNORMAL
SETTING 1 ABG: ABNORMAL
SETTING 2 ABG: ABNORMAL
SETTING 2 ABG: ABNORMAL
SETTING 3 ABG: ABNORMAL
SETTING 3 ABG: ABNORMAL
SODIUM SERPL-SCNC: 133 MMOL/L (ref 136–145)
SODIUM SERPL-SCNC: 135 MMOL/L (ref 136–145)
SODIUM SERPL-SCNC: 135 MMOL/L (ref 136–145)
TRANSFUSION ORDER: NORMAL
TRIGL SERPL-MCNC: 322 MG/DL (ref 37–140)
VANCOMYCIN SERPL-MCNC: 12.6 UG/ML (ref 15–20)
WBC # SPEC AUTO: 25.3 X10(3)/MCL (ref 4.5–11)
WBC # SPEC AUTO: 25.6 X10(3)/MCL (ref 4.5–11)

## 2020-12-29 LAB
ABS NEUT (OLG): 3.52 X10(3)/MCL (ref 2.1–9.2)
ABS NEUT (OLG): 9.38 X10(3)/MCL (ref 2.1–9.2)
ALBUMIN SERPL-MCNC: 1.9 GM/DL (ref 3.5–5)
ALBUMIN SERPL-MCNC: 2.1 GM/DL (ref 3.5–5)
ALBUMIN SERPL-MCNC: 2.3 GM/DL (ref 3.5–5)
ALBUMIN/GLOB SERPL: 0.9 RATIO (ref 1.1–2)
ALBUMIN/GLOB SERPL: 1 RATIO (ref 1.1–2)
ALBUMIN/GLOB SERPL: 1.1 RATIO (ref 1.1–2)
ALP SERPL-CCNC: 163 UNIT/L (ref 40–150)
ALP SERPL-CCNC: 167 UNIT/L (ref 40–150)
ALP SERPL-CCNC: 168 UNIT/L (ref 40–150)
ALT SERPL-CCNC: 3962 UNIT/L (ref 0–55)
ALT SERPL-CCNC: 4321 UNIT/L (ref 0–55)
ALT SERPL-CCNC: 8398 UNIT/L (ref 0–55)
ANISOCYTOSIS BLD QL SMEAR: ABNORMAL
APTT PPP: 55.7 SECOND(S) (ref 23.3–37)
AST SERPL-CCNC: ABNORMAL UNIT/L (ref 5–34)
BASOPHILS # BLD AUTO: 0 X10(3)/MCL (ref 0–0.2)
BASOPHILS NFR BLD AUTO: 0 %
BASOPHILS NFR BLD MANUAL: 0 %
BILIRUB SERPL-MCNC: 2.4 MG/DL
BILIRUB SERPL-MCNC: 2.5 MG/DL
BILIRUB SERPL-MCNC: 2.6 MG/DL
BILIRUBIN DIRECT+TOT PNL SERPL-MCNC: 0.4 MG/DL (ref 0–0.8)
BILIRUBIN DIRECT+TOT PNL SERPL-MCNC: 0.5 MG/DL (ref 0–0.8)
BILIRUBIN DIRECT+TOT PNL SERPL-MCNC: 0.5 MG/DL (ref 0–0.8)
BILIRUBIN DIRECT+TOT PNL SERPL-MCNC: 1.9 MG/DL (ref 0–0.5)
BILIRUBIN DIRECT+TOT PNL SERPL-MCNC: 2 MG/DL (ref 0–0.5)
BILIRUBIN DIRECT+TOT PNL SERPL-MCNC: 2.2 MG/DL (ref 0–0.5)
BUN SERPL-MCNC: 71 MG/DL (ref 9.8–20.1)
BUN SERPL-MCNC: 72 MG/DL (ref 9.8–20.1)
BUN SERPL-MCNC: 79 MG/DL (ref 9.8–20.1)
CALCIUM SERPL-MCNC: 6 MG/DL (ref 8.4–10.2)
CALCIUM SERPL-MCNC: 6.5 MG/DL (ref 8.4–10.2)
CALCIUM SERPL-MCNC: 7.5 MG/DL (ref 8.4–10.2)
CHLORIDE SERPL-SCNC: 94 MMOL/L (ref 98–107)
CHLORIDE SERPL-SCNC: 95 MMOL/L (ref 98–107)
CHLORIDE SERPL-SCNC: 96 MMOL/L (ref 98–107)
CO2 SERPL-SCNC: 25 MMOL/L (ref 22–29)
CO2 SERPL-SCNC: 26 MMOL/L (ref 22–29)
CO2 SERPL-SCNC: 27 MMOL/L (ref 22–29)
CREAT SERPL-MCNC: 1.49 MG/DL (ref 0.55–1.02)
CREAT SERPL-MCNC: 1.54 MG/DL (ref 0.55–1.02)
CREAT SERPL-MCNC: 1.56 MG/DL (ref 0.55–1.02)
CREAT SERPL-MCNC: 1.8 MG/DL (ref 0.55–1.02)
CREAT UR-MCNC: 12.1 MG/DL (ref 45–106)
CRP SERPL-MCNC: 3.01 MG/DL
D DIMER PPP IA.FEU-MCNC: 2.27 MCG/ML FEU
EOSINOPHIL NFR BLD MANUAL: 0 %
ERYTHROCYTE [DISTWIDTH] IN BLOOD BY AUTOMATED COUNT: 16.9 % (ref 11.5–14.5)
ERYTHROCYTE [DISTWIDTH] IN BLOOD BY AUTOMATED COUNT: 17.3 % (ref 11.5–14.5)
ERYTHROCYTE [SEDIMENTATION RATE] IN BLOOD: 14 MM/HR (ref 0–20)
FERRITIN SERPL-MCNC: ABNORMAL NG/ML (ref 4.63–204)
FESODIUM % (OHS): 8 %
GLOBULIN SER-MCNC: 1.9 GM/DL (ref 2.4–3.5)
GLOBULIN SER-MCNC: 2 GM/DL (ref 2.4–3.5)
GLOBULIN SER-MCNC: 2.5 GM/DL (ref 2.4–3.5)
GLUCOSE SERPL-MCNC: 278 MG/DL (ref 74–100)
GLUCOSE SERPL-MCNC: 494 MG/DL (ref 74–100)
GLUCOSE SERPL-MCNC: 530 MG/DL (ref 74–100)
GRANULOCYTES NFR BLD MANUAL: 89 % (ref 43–75)
HAPTOGLOB SERPL-MCNC: 100 MG/DL (ref 35–250)
HCO3 UR-SCNC: 32 MMOL/L (ref 22–26)
HCO3 UR-SCNC: 37.9 MMOL/L (ref 22–26)
HCT VFR BLD AUTO: 22.8 % (ref 35–46)
HCT VFR BLD AUTO: 27.1 % (ref 35–46)
HGB BLD-MCNC: 7 GM/DL (ref 12–16)
HGB BLD-MCNC: 8.7 GM/DL (ref 12–16)
HYPOCHROMIA BLD QL SMEAR: ABNORMAL
IMM GRANULOCYTES # BLD AUTO: 0.3 10*3/UL
IMM GRANULOCYTES NFR BLD AUTO: 3 %
INR PPP: 2.69 (ref 0.9–1.2)
LDH SERPL-CCNC: ABNORMAL UNIT/L (ref 140–271)
LYMPHOCYTES # BLD AUTO: 0.4 X10(3)/MCL (ref 0.6–4.6)
LYMPHOCYTES NFR BLD AUTO: 4 %
LYMPHOCYTES NFR BLD MANUAL: 4 % (ref 20.5–51.1)
MAGNESIUM SERPL-MCNC: 2.35 MG/DL (ref 1.6–2.6)
MCH RBC QN AUTO: 25.4 PG (ref 26–34)
MCH RBC QN AUTO: 27.3 PG (ref 26–34)
MCHC RBC AUTO-ENTMCNC: 30.7 GM/DL (ref 31–37)
MCHC RBC AUTO-ENTMCNC: 32.1 GM/DL (ref 31–37)
MCV RBC AUTO: 82.6 FL (ref 80–100)
MCV RBC AUTO: 85 FL (ref 80–100)
METAMYELOCYTES NFR BLD MANUAL: 2 %
MONOCYTES # BLD AUTO: 0.7 X10(3)/MCL (ref 0.1–1.3)
MONOCYTES NFR BLD AUTO: 7 %
MONOCYTES NFR BLD MANUAL: 0 % (ref 2–9)
MYELOCYTES NFR BLD MANUAL: 1 %
NEUTROPHILS # BLD AUTO: 9.38 X10(3)/MCL (ref 2.1–9.2)
NEUTROPHILS NFR BLD AUTO: 86 %
NEUTS BAND NFR BLD MANUAL: 4 % (ref 0–10)
O2 HGB ARTERIAL: 90.2 % (ref 94–100)
O2 HGB ARTERIAL: 96.2 % (ref 94–100)
PCO2 BLDA: 58 MMHG (ref 35–45)
PCO2 BLDA: 64 MMHG (ref 35–45)
PH SMN: 7.35 [PH] (ref 7.35–7.45)
PH SMN: 7.38 [PH] (ref 7.35–7.45)
PHOSPHATE SERPL-MCNC: 6.7 MG/DL (ref 2.3–4.7)
PLATELET # BLD AUTO: 18 X10(3)/MCL (ref 130–400)
PLATELET # BLD AUTO: 47 X10(3)/MCL (ref 130–400)
PLATELET # BLD EST: ABNORMAL 10*3/UL
PMV BLD AUTO: 10.2 FL (ref 7.4–10.4)
PMV BLD AUTO: 11.2 FL (ref 7.4–10.4)
PO2 BLDA: 134 MMHG (ref 80–100)
PO2 BLDA: 61 MMHG (ref 80–100)
POC ALLENS TEST: POSITIVE
POC ALLENS TEST: POSITIVE
POC BE: 10.8 (ref -2–2)
POC BE: 5.5 (ref -2–2)
POC CO HGB: 2.5 %
POC CO HGB: 3 %
POC CO2: 33.8 MMOL/L (ref 22–26)
POC CO2: 39.9 MMOL/L (ref 22–26)
POC MET HGB: 1 % (ref 0.4–1.5)
POC MET HGB: 1.1 % (ref 0.4–1.5)
POC SAMPLESOURCE: ABNORMAL
POC SAMPLESOURCE: ABNORMAL
POC SATURATED O2: 94 %
POC SATURATED O2: 99.8 %
POC SITE: ABNORMAL
POC SITE: ABNORMAL
POC THB: 10.4 GM/DL (ref 12–16)
POC THB: 8.6 GM/DL (ref 12–16)
POC TREATMENT: ABNORMAL
POC TREATMENT: ABNORMAL
POLYCHROMASIA BLD QL SMEAR: ABNORMAL
POTASSIUM SERPL-SCNC: 4.4 MMOL/L (ref 3.5–5.1)
POTASSIUM SERPL-SCNC: 4.7 MMOL/L (ref 3.5–5.1)
POTASSIUM SERPL-SCNC: 6.7 MMOL/L (ref 3.5–5.1)
PRODUCT READY: NORMAL
PROT SERPL-MCNC: 3.9 GM/DL (ref 6.4–8.3)
PROT SERPL-MCNC: 4 GM/DL (ref 6.4–8.3)
PROT SERPL-MCNC: 4.8 GM/DL (ref 6.4–8.3)
PROTHROMBIN TIME: 28.1 SECOND(S) (ref 11.9–14.4)
RBC # BLD AUTO: 2.76 X10(6)/MCL (ref 4–5.2)
RBC # BLD AUTO: 3.19 X10(6)/MCL (ref 4–5.2)
RBC MORPH BLD: ABNORMAL
RET# (OHS): 0.09 X10(6)/MCL (ref 0.02–0.08)
RETICULOCYTE COUNT AUTOMATED (OLG): 3.4 % (ref 0.5–1.5)
SETTING 1 ABG: ABNORMAL
SETTING 1 ABG: ABNORMAL
SETTING 2 ABG: ABNORMAL
SETTING 2 ABG: ABNORMAL
SETTING 3 ABG: ABNORMAL
SETTING 3 ABG: ABNORMAL
SETTING 4 ABG: ABNORMAL
SODIUM SERPL-SCNC: 129 MMOL/L (ref 136–145)
SODIUM SERPL-SCNC: 130 MMOL/L (ref 136–145)
SODIUM SERPL-SCNC: 132 MMOL/L (ref 136–145)
SODIUM SERPL-SCNC: 134 MMOL/L (ref 136–145)
SODIUM UR-SCNC: 84 MMOL/L
SODIUM UR-SCNC: 84 MMOL/L
TRANSFUSION ORDER: NORMAL
TRIGL SERPL-MCNC: 215 MG/DL (ref 37–140)
VANCOMYCIN SERPL-MCNC: 18.8 UG/ML (ref 15–20)
WBC # SPEC AUTO: 10.9 X10(3)/MCL (ref 4.5–11)
WBC # SPEC AUTO: 4 X10(3)/MCL (ref 4.5–11)

## 2020-12-30 LAB
ABS NEUT (OLG): 4.31 X10(3)/MCL (ref 2.1–9.2)
ABS NEUT (OLG): 5.31 X10(3)/MCL (ref 2.1–9.2)
ABS NEUT (OLG): 7.69 X10(3)/MCL (ref 2.1–9.2)
ALBUMIN SERPL-MCNC: 2.7 GM/DL (ref 3.5–5)
ALBUMIN/GLOB SERPL: 1.3 RATIO (ref 1.1–2)
ALP SERPL-CCNC: 191 UNIT/L (ref 40–150)
ALT SERPL-CCNC: 9106 UNIT/L (ref 0–55)
ANISOCYTOSIS BLD QL SMEAR: ABNORMAL
AST SERPL-CCNC: ABNORMAL UNIT/L (ref 5–34)
BASOPHILS # BLD AUTO: 0 X10(3)/MCL (ref 0–0.2)
BASOPHILS # BLD AUTO: 0 X10(3)/MCL (ref 0–0.2)
BASOPHILS NFR BLD AUTO: 0 %
BASOPHILS NFR BLD AUTO: 0 %
BASOPHILS NFR BLD MANUAL: 0 %
BILIRUB SERPL-MCNC: 3.6 MG/DL
BILIRUBIN DIRECT+TOT PNL SERPL-MCNC: 0.6 MG/DL (ref 0–0.8)
BILIRUBIN DIRECT+TOT PNL SERPL-MCNC: 3 MG/DL (ref 0–0.5)
BUN SERPL-MCNC: 82 MG/DL (ref 9.8–20.1)
CALCIUM SERPL-MCNC: 7 MG/DL (ref 8.4–10.2)
CHLORIDE SERPL-SCNC: 94 MMOL/L (ref 98–107)
CO2 SERPL-SCNC: 34 MMOL/L (ref 22–29)
CREAT SERPL-MCNC: 1.48 MG/DL (ref 0.55–1.02)
EOSINOPHIL NFR BLD MANUAL: 0 %
ERYTHROCYTE [DISTWIDTH] IN BLOOD BY AUTOMATED COUNT: 17.1 % (ref 11.5–14.5)
ERYTHROCYTE [DISTWIDTH] IN BLOOD BY AUTOMATED COUNT: 17.2 % (ref 11.5–14.5)
ERYTHROCYTE [DISTWIDTH] IN BLOOD BY AUTOMATED COUNT: 17.2 % (ref 11.5–14.5)
GLOBULIN SER-MCNC: 2.1 GM/DL (ref 2.4–3.5)
GLUCOSE SERPL-MCNC: 334 MG/DL (ref 74–100)
GRANULOCYTES NFR BLD MANUAL: 88 % (ref 43–75)
HCO3 UR-SCNC: 30.5 MMOL/L (ref 22–26)
HCO3 UR-SCNC: 37.4 MMOL/L (ref 22–26)
HCO3 UR-SCNC: 38.5 MMOL/L (ref 22–26)
HCT VFR BLD AUTO: 30 % (ref 35–46)
HCT VFR BLD AUTO: 30.8 % (ref 35–46)
HCT VFR BLD AUTO: 37.2 % (ref 35–46)
HGB BLD-MCNC: 12 GM/DL (ref 12–16)
HGB BLD-MCNC: 9.6 GM/DL (ref 12–16)
HGB BLD-MCNC: 9.8 GM/DL (ref 12–16)
IMM GRANULOCYTES # BLD AUTO: 0.06 10*3/UL
IMM GRANULOCYTES # BLD AUTO: 0.17 10*3/UL
IMM GRANULOCYTES NFR BLD AUTO: 1 %
IMM GRANULOCYTES NFR BLD AUTO: 2 %
INR PPP: 2.25 (ref 0.9–1.2)
LYMPHOCYTES # BLD AUTO: 0.2 X10(3)/MCL (ref 0.6–4.6)
LYMPHOCYTES # BLD AUTO: 0.4 X10(3)/MCL (ref 0.6–4.6)
LYMPHOCYTES NFR BLD AUTO: 5 %
LYMPHOCYTES NFR BLD AUTO: 5 %
LYMPHOCYTES NFR BLD MANUAL: 3 % (ref 20.5–51.1)
MAGNESIUM SERPL-MCNC: 2.43 MG/DL (ref 1.6–2.6)
MCH RBC QN AUTO: 26.8 PG (ref 26–34)
MCH RBC QN AUTO: 27.1 PG (ref 26–34)
MCH RBC QN AUTO: 27.1 PG (ref 26–34)
MCHC RBC AUTO-ENTMCNC: 31.8 GM/DL (ref 31–37)
MCHC RBC AUTO-ENTMCNC: 32 GM/DL (ref 31–37)
MCHC RBC AUTO-ENTMCNC: 32.3 GM/DL (ref 31–37)
MCV RBC AUTO: 84.2 FL (ref 80–100)
MCV RBC AUTO: 84.4 FL (ref 80–100)
MCV RBC AUTO: 84.7 FL (ref 80–100)
METAMYELOCYTES NFR BLD MANUAL: 1 %
MONOCYTES # BLD AUTO: 0.2 X10(3)/MCL (ref 0.1–1.3)
MONOCYTES # BLD AUTO: 0.3 X10(3)/MCL (ref 0.1–1.3)
MONOCYTES NFR BLD AUTO: 4 %
MONOCYTES NFR BLD AUTO: 4 %
MONOCYTES NFR BLD MANUAL: 3 % (ref 2–9)
NEUTROPHILS # BLD AUTO: 4.31 X10(3)/MCL (ref 2.1–9.2)
NEUTROPHILS # BLD AUTO: 7.69 X10(3)/MCL (ref 2.1–9.2)
NEUTROPHILS NFR BLD AUTO: 89 %
NEUTROPHILS NFR BLD AUTO: 90 %
NEUTS BAND NFR BLD MANUAL: 5 % (ref 0–10)
NRBC BLD MANUAL-RTO: 1 %
O2 HGB ARTERIAL: 85.6 % (ref 94–100)
O2 HGB ARTERIAL: 91.1 % (ref 94–100)
O2 HGB ARTERIAL: 96.9 % (ref 94–100)
PCO2 BLDA: 23 MMHG (ref 35–45)
PCO2 BLDA: 55 MMHG (ref 35–45)
PCO2 BLDA: 65 MMHG (ref 35–45)
PH SMN: 7.38 [PH] (ref 7.35–7.45)
PH SMN: 7.44 [PH] (ref 7.35–7.45)
PH SMN: 7.73 [PH] (ref 7.35–7.45)
PHOSPHATE SERPL-MCNC: 6.1 MG/DL (ref 2.3–4.7)
PLATELET # BLD AUTO: 27 X10(3)/MCL (ref 130–400)
PLATELET # BLD AUTO: 28 X10(3)/MCL (ref 130–400)
PLATELET # BLD AUTO: 36 X10(3)/MCL (ref 130–400)
PLATELET # BLD EST: ABNORMAL 10*3/UL
PMV BLD AUTO: 10 FL (ref 7.4–10.4)
PMV BLD AUTO: 11.3 FL (ref 7.4–10.4)
PMV BLD AUTO: ABNORMAL FL (ref 7.4–10.4)
PO2 BLDA: 195 MMHG (ref 80–100)
PO2 BLDA: 52 MMHG (ref 80–100)
PO2 BLDA: 64 MMHG (ref 80–100)
POC ALLENS TEST: POSITIVE
POC BE: 11.2 (ref -2–2)
POC BE: 11.4 (ref -2–2)
POC BE: 11.6 (ref -2–2)
POC CO HGB: 2.8 %
POC CO2: 31.2 MMOL/L (ref 22–26)
POC CO2: 39.1 MMOL/L (ref 22–26)
POC CO2: 40.5 MMOL/L (ref 22–26)
POC MET HGB: 0.7 % (ref 0.4–1.5)
POC MET HGB: 1.1 % (ref 0.4–1.5)
POC MET HGB: 1.2 % (ref 0.4–1.5)
POC SAMPLESOURCE: ABNORMAL
POC SATURATED O2: 100.4 %
POC SATURATED O2: 89.1 %
POC SATURATED O2: 94.9 %
POC SITE: ABNORMAL
POC THB: 10 GM/DL (ref 12–16)
POC THB: 10.1 GM/DL (ref 12–16)
POC THB: 10.3 GM/DL (ref 12–16)
POC TREATMENT: ABNORMAL
POTASSIUM SERPL-SCNC: 4.1 MMOL/L (ref 3.5–5.1)
PROT SERPL-MCNC: 4.8 GM/DL (ref 6.4–8.3)
PROTHROMBIN TIME: 24.4 SECOND(S) (ref 11.9–14.4)
RBC # BLD AUTO: 3.54 X10(6)/MCL (ref 4–5.2)
RBC # BLD AUTO: 3.65 X10(6)/MCL (ref 4–5.2)
RBC # BLD AUTO: 4.42 X10(6)/MCL (ref 4–5.2)
RBC MORPH BLD: ABNORMAL
SETTING 1 ABG: ABNORMAL
SETTING 2 ABG: ABNORMAL
SETTING 3 ABG: ABNORMAL
SETTING 4 ABG: ABNORMAL
SODIUM SERPL-SCNC: 139 MMOL/L (ref 136–145)
TRIGL SERPL-MCNC: 286 MG/DL (ref 37–140)
VANCOMYCIN TROUGH SERPL-MCNC: 15.4 UG/ML (ref 15–20)
WBC # SPEC AUTO: 4.8 X10(3)/MCL (ref 4.5–11)
WBC # SPEC AUTO: 5.8 X10(3)/MCL (ref 4.5–11)
WBC # SPEC AUTO: 8.6 X10(3)/MCL (ref 4.5–11)

## 2020-12-31 LAB
ABS NEUT (OLG): 4.09 X10(3)/MCL (ref 2.1–9.2)
ABS NEUT (OLG): 7.47 X10(3)/MCL (ref 2.1–9.2)
ALBUMIN SERPL-MCNC: 2.1 GM/DL (ref 3.5–5)
ALBUMIN/GLOB SERPL: 1.1 RATIO (ref 1.1–2)
ALP SERPL-CCNC: 212 UNIT/L (ref 40–150)
ALT SERPL-CCNC: 5764 UNIT/L (ref 0–55)
AST SERPL-CCNC: 5384 UNIT/L (ref 5–34)
BASOPHILS # BLD AUTO: 0 X10(3)/MCL (ref 0–0.2)
BASOPHILS # BLD AUTO: 0 X10(3)/MCL (ref 0–0.2)
BASOPHILS NFR BLD AUTO: 0 %
BASOPHILS NFR BLD AUTO: 0 %
BILIRUB SERPL-MCNC: 2.3 MG/DL
BILIRUBIN DIRECT+TOT PNL SERPL-MCNC: 0.5 MG/DL (ref 0–0.8)
BILIRUBIN DIRECT+TOT PNL SERPL-MCNC: 1.8 MG/DL (ref 0–0.5)
BUN SERPL-MCNC: 77 MG/DL (ref 9.8–20.1)
CALCIUM SERPL-MCNC: 7.3 MG/DL (ref 8.4–10.2)
CHLORIDE SERPL-SCNC: 104 MMOL/L (ref 98–107)
CO2 SERPL-SCNC: 31 MMOL/L (ref 22–29)
CREAT SERPL-MCNC: 1.13 MG/DL (ref 0.55–1.02)
CRP SERPL-MCNC: 2.62 MG/DL
D DIMER PPP IA.FEU-MCNC: 3.51 MCG/ML FEU
ERYTHROCYTE [DISTWIDTH] IN BLOOD BY AUTOMATED COUNT: 17.4 % (ref 11.5–14.5)
ERYTHROCYTE [DISTWIDTH] IN BLOOD BY AUTOMATED COUNT: 17.7 % (ref 11.5–14.5)
ERYTHROCYTE [SEDIMENTATION RATE] IN BLOOD: 9 MM/HR (ref 0–20)
FERRITIN SERPL-MCNC: ABNORMAL NG/ML (ref 4.63–204)
GLOBULIN SER-MCNC: 1.9 GM/DL (ref 2.4–3.5)
GLUCOSE SERPL-MCNC: 188 MG/DL (ref 74–100)
HCO3 UR-SCNC: 36.8 MMOL/L (ref 22–26)
HCT VFR BLD AUTO: 29.5 % (ref 35–46)
HCT VFR BLD AUTO: 33 % (ref 35–46)
HGB BLD-MCNC: 10.2 GM/DL (ref 12–16)
HGB BLD-MCNC: 9.3 GM/DL (ref 12–16)
IMM GRANULOCYTES # BLD AUTO: 0.07 10*3/UL
IMM GRANULOCYTES # BLD AUTO: 0.13 10*3/UL
IMM GRANULOCYTES NFR BLD AUTO: 2 %
IMM GRANULOCYTES NFR BLD AUTO: 2 %
LDH SERPL-CCNC: 1957 UNIT/L (ref 140–271)
LYMPHOCYTES # BLD AUTO: 0.1 X10(3)/MCL (ref 0.6–4.6)
LYMPHOCYTES # BLD AUTO: 0.2 X10(3)/MCL (ref 0.6–4.6)
LYMPHOCYTES NFR BLD AUTO: 2 %
LYMPHOCYTES NFR BLD AUTO: 3 %
MCH RBC QN AUTO: 26.8 PG (ref 26–34)
MCH RBC QN AUTO: 26.8 PG (ref 26–34)
MCHC RBC AUTO-ENTMCNC: 30.9 GM/DL (ref 31–37)
MCHC RBC AUTO-ENTMCNC: 31.5 GM/DL (ref 31–37)
MCV RBC AUTO: 85 FL (ref 80–100)
MCV RBC AUTO: 86.8 FL (ref 80–100)
MONOCYTES # BLD AUTO: 0.2 X10(3)/MCL (ref 0.1–1.3)
MONOCYTES # BLD AUTO: 0.2 X10(3)/MCL (ref 0.1–1.3)
MONOCYTES NFR BLD AUTO: 3 %
MONOCYTES NFR BLD AUTO: 5 %
NEUTROPHILS # BLD AUTO: 4.09 X10(3)/MCL (ref 2.1–9.2)
NEUTROPHILS # BLD AUTO: 7.47 X10(3)/MCL (ref 2.1–9.2)
NEUTROPHILS NFR BLD AUTO: 91 %
NEUTROPHILS NFR BLD AUTO: 93 %
O2 HGB ARTERIAL: 90.8 % (ref 94–100)
PCO2 BLDA: 58 MMHG (ref 35–45)
PH SMN: 7.41 [PH] (ref 7.35–7.45)
PLATELET # BLD AUTO: 25 X10(3)/MCL (ref 130–400)
PLATELET # BLD AUTO: 28 X10(3)/MCL (ref 130–400)
PMV BLD AUTO: 10.6 FL (ref 7.4–10.4)
PMV BLD AUTO: 11.5 FL (ref 7.4–10.4)
PO2 BLDA: 61 MMHG (ref 80–100)
POC ALLENS TEST: POSITIVE
POC BE: 10.5 (ref -2–2)
POC CO HGB: 2.5 %
POC CO2: 38.6 MMOL/L (ref 22–26)
POC MET HGB: 0.5 % (ref 0.4–1.5)
POC SAMPLESOURCE: ABNORMAL
POC SATURATED O2: 93.6 %
POC SITE: ABNORMAL
POC THB: 9.9 GM/DL (ref 12–16)
POC TREATMENT: ABNORMAL
POTASSIUM SERPL-SCNC: 3.7 MMOL/L (ref 3.5–5.1)
PROT SERPL-MCNC: 4 GM/DL (ref 6.4–8.3)
RBC # BLD AUTO: 3.47 X10(6)/MCL (ref 4–5.2)
RBC # BLD AUTO: 3.8 X10(6)/MCL (ref 4–5.2)
SETTING 1 ABG: ABNORMAL
SETTING 2 ABG: ABNORMAL
SETTING 3 ABG: ABNORMAL
SETTING 4 ABG: ABNORMAL
SODIUM SERPL-SCNC: 145 MMOL/L (ref 136–145)
TRIGL SERPL-MCNC: 246 MG/DL (ref 37–140)
VANCOMYCIN TROUGH SERPL-MCNC: 16.2 UG/ML (ref 15–20)
WBC # SPEC AUTO: 4.5 X10(3)/MCL (ref 4.5–11)
WBC # SPEC AUTO: 8 X10(3)/MCL (ref 4.5–11)

## 2021-01-01 LAB
ABS NEUT (OLG): 15.62 X10(3)/MCL (ref 2.1–9.2)
ABS NEUT (OLG): 18.71 X10(3)/MCL (ref 2.1–9.2)
ALBUMIN SERPL-MCNC: 2.3 GM/DL (ref 3.5–5)
ALBUMIN/GLOB SERPL: 1 RATIO (ref 1.1–2)
ALP SERPL-CCNC: 299 UNIT/L (ref 40–150)
ALT SERPL-CCNC: 4308 UNIT/L (ref 0–55)
ANISOCYTOSIS BLD QL SMEAR: NORMAL
AST SERPL-CCNC: 2841 UNIT/L (ref 5–34)
BASOPHILS # BLD AUTO: 0 X10(3)/MCL (ref 0–0.2)
BASOPHILS # BLD AUTO: 0.1 X10(3)/MCL (ref 0–0.2)
BASOPHILS NFR BLD AUTO: 0 %
BASOPHILS NFR BLD AUTO: 0 %
BILIRUB SERPL-MCNC: 2.4 MG/DL
BILIRUBIN DIRECT+TOT PNL SERPL-MCNC: 0.6 MG/DL (ref 0–0.8)
BILIRUBIN DIRECT+TOT PNL SERPL-MCNC: 1.8 MG/DL (ref 0–0.5)
BUN SERPL-MCNC: 69 MG/DL (ref 9.8–20.1)
CALCIUM SERPL-MCNC: 7.7 MG/DL (ref 8.4–10.2)
CHLORIDE SERPL-SCNC: 108 MMOL/L (ref 98–107)
CO2 SERPL-SCNC: 33 MMOL/L (ref 22–29)
CREAT SERPL-MCNC: 0.97 MG/DL (ref 0.55–1.02)
ERYTHROCYTE [DISTWIDTH] IN BLOOD BY AUTOMATED COUNT: 18.6 % (ref 11.5–14.5)
ERYTHROCYTE [DISTWIDTH] IN BLOOD BY AUTOMATED COUNT: 19.3 % (ref 11.5–14.5)
GLOBULIN SER-MCNC: 2.2 GM/DL (ref 2.4–3.5)
GLUCOSE SERPL-MCNC: 145 MG/DL (ref 74–100)
HCO3 UR-SCNC: 37 MMOL/L (ref 22–26)
HCT VFR BLD AUTO: 37 % (ref 35–46)
HCT VFR BLD AUTO: 38.2 % (ref 35–46)
HGB BLD-MCNC: 11.3 GM/DL (ref 12–16)
HGB BLD-MCNC: 11.8 GM/DL (ref 12–16)
HYPOCHROMIA BLD QL SMEAR: NORMAL
IMM GRANULOCYTES # BLD AUTO: 0.39 10*3/UL
IMM GRANULOCYTES # BLD AUTO: 0.65 10*3/UL
IMM GRANULOCYTES NFR BLD AUTO: 2 %
IMM GRANULOCYTES NFR BLD AUTO: 3 %
LYMPHOCYTES # BLD AUTO: 0.2 X10(3)/MCL (ref 0.6–4.6)
LYMPHOCYTES # BLD AUTO: 0.2 X10(3)/MCL (ref 0.6–4.6)
LYMPHOCYTES NFR BLD AUTO: 1 %
LYMPHOCYTES NFR BLD AUTO: 1 %
MCH RBC QN AUTO: 26.9 PG (ref 26–34)
MCH RBC QN AUTO: 27.4 PG (ref 26–34)
MCHC RBC AUTO-ENTMCNC: 30.5 GM/DL (ref 31–37)
MCHC RBC AUTO-ENTMCNC: 30.9 GM/DL (ref 31–37)
MCV RBC AUTO: 88.1 FL (ref 80–100)
MCV RBC AUTO: 88.6 FL (ref 80–100)
MONOCYTES # BLD AUTO: 0.5 X10(3)/MCL (ref 0.1–1.3)
MONOCYTES # BLD AUTO: 0.6 X10(3)/MCL (ref 0.1–1.3)
MONOCYTES NFR BLD AUTO: 3 %
MONOCYTES NFR BLD AUTO: 3 %
NEUTROPHILS # BLD AUTO: 15.62 X10(3)/MCL (ref 2.1–9.2)
NEUTROPHILS # BLD AUTO: 18.71 X10(3)/MCL (ref 2.1–9.2)
NEUTROPHILS NFR BLD AUTO: 92 %
NEUTROPHILS NFR BLD AUTO: 93 %
O2 HGB ARTERIAL: 90.1 % (ref 94–100)
PCO2 BLDA: 64 MMHG (ref 35–45)
PH SMN: 7.37 [PH] (ref 7.35–7.45)
PLATELET # BLD AUTO: 24 X10(3)/MCL (ref 130–400)
PLATELET # BLD AUTO: 49 X10(3)/MCL (ref 130–400)
PLATELET # BLD EST: NORMAL 10*3/UL
PMV BLD AUTO: 11.8 FL (ref 7.4–10.4)
PMV BLD AUTO: ABNORMAL FL (ref 7.4–10.4)
PO2 BLDA: 60 MMHG (ref 80–100)
POC ALLENS TEST: POSITIVE
POC BE: 9.5 (ref -2–2)
POC CO HGB: 2.8 %
POC CO2: 39 MMOL/L (ref 22–26)
POC MET HGB: 1 % (ref 0.4–1.5)
POC SAMPLESOURCE: ABNORMAL
POC SATURATED O2: 93.7 %
POC SITE: ABNORMAL
POC THB: 11.9 GM/DL (ref 12–16)
POC TREATMENT: ABNORMAL
POLYCHROMASIA BLD QL SMEAR: NORMAL
POTASSIUM SERPL-SCNC: 4.2 MMOL/L (ref 3.5–5.1)
PROT SERPL-MCNC: 4.5 GM/DL (ref 6.4–8.3)
RBC # BLD AUTO: 4.2 X10(6)/MCL (ref 4–5.2)
RBC # BLD AUTO: 4.31 X10(6)/MCL (ref 4–5.2)
SETTING 1 ABG: ABNORMAL
SETTING 2 ABG: ABNORMAL
SETTING 3 ABG: ABNORMAL
SETTING 4 ABG: ABNORMAL
SODIUM SERPL-SCNC: 148 MMOL/L (ref 136–145)
TRANSFUSION ORDER: NORMAL
TRIGL SERPL-MCNC: 174 MG/DL (ref 37–140)
VANCOMYCIN TROUGH SERPL-MCNC: 15.7 UG/ML (ref 15–20)
WBC # SPEC AUTO: 16.8 X10(3)/MCL (ref 4.5–11)
WBC # SPEC AUTO: 20.3 X10(3)/MCL (ref 4.5–11)

## 2021-01-02 LAB
ABS NEUT (OLG): 22.46 X10(3)/MCL (ref 2.1–9.2)
ABS NEUT (OLG): 9.92 X10(3)/MCL (ref 2.1–9.2)
ALBUMIN SERPL-MCNC: 2.5 GM/DL (ref 3.5–5)
ALBUMIN/GLOB SERPL: 1 RATIO (ref 1.1–2)
ALP SERPL-CCNC: 294 UNIT/L (ref 40–150)
ALT SERPL-CCNC: 2571 UNIT/L (ref 0–55)
ANISOCYTOSIS BLD QL SMEAR: ABNORMAL
AST SERPL-CCNC: 974 UNIT/L (ref 5–34)
BASOPHILS # BLD AUTO: 0.1 X10(3)/MCL (ref 0–0.2)
BASOPHILS NFR BLD AUTO: 0 %
BASOPHILS NFR BLD MANUAL: 0 %
BILIRUB SERPL-MCNC: 2.9 MG/DL
BILIRUBIN DIRECT+TOT PNL SERPL-MCNC: 0.9 MG/DL (ref 0–0.8)
BILIRUBIN DIRECT+TOT PNL SERPL-MCNC: 2 MG/DL (ref 0–0.5)
BUN SERPL-MCNC: 65 MG/DL (ref 9.8–20.1)
CALCIUM SERPL-MCNC: 8 MG/DL (ref 8.4–10.2)
CHLORIDE SERPL-SCNC: 110 MMOL/L (ref 98–107)
CO2 SERPL-SCNC: 31 MMOL/L (ref 22–29)
CREAT SERPL-MCNC: 0.92 MG/DL (ref 0.55–1.02)
EOSINOPHIL NFR BLD MANUAL: 0 %
ERYTHROCYTE [DISTWIDTH] IN BLOOD BY AUTOMATED COUNT: 20.9 % (ref 11.5–14.5)
ERYTHROCYTE [DISTWIDTH] IN BLOOD BY AUTOMATED COUNT: 21.1 % (ref 11.5–14.5)
ERYTHROCYTE [SEDIMENTATION RATE] IN BLOOD: 6 MM/HR (ref 0–20)
GLOBULIN SER-MCNC: 2.5 GM/DL (ref 2.4–3.5)
GLUCOSE SERPL-MCNC: 185 MG/DL (ref 74–100)
GRAM STN SPEC: NORMAL
GRANULOCYTES NFR BLD MANUAL: 89 % (ref 43–75)
HCO3 UR-SCNC: 38.5 MMOL/L (ref 22–26)
HCT VFR BLD AUTO: 32.1 % (ref 35–46)
HCT VFR BLD AUTO: 36.8 % (ref 35–46)
HGB BLD-MCNC: 10.9 GM/DL (ref 12–16)
HGB BLD-MCNC: 9.4 GM/DL (ref 12–16)
HYPOCHROMIA BLD QL SMEAR: ABNORMAL
IMM GRANULOCYTES # BLD AUTO: 0.49 10*3/UL
IMM GRANULOCYTES NFR BLD AUTO: 2 %
LYMPHOCYTES # BLD AUTO: 0.4 X10(3)/MCL (ref 0.6–4.6)
LYMPHOCYTES NFR BLD AUTO: 2 %
LYMPHOCYTES NFR BLD MANUAL: 4 % (ref 20.5–51.1)
MCH RBC QN AUTO: 27.3 PG (ref 26–34)
MCH RBC QN AUTO: 27.4 PG (ref 26–34)
MCHC RBC AUTO-ENTMCNC: 29.3 GM/DL (ref 31–37)
MCHC RBC AUTO-ENTMCNC: 29.6 GM/DL (ref 31–37)
MCV RBC AUTO: 92.5 FL (ref 80–100)
MCV RBC AUTO: 93.3 FL (ref 80–100)
MICROCYTES BLD QL SMEAR: ABNORMAL
MONOCYTES # BLD AUTO: 0.9 X10(3)/MCL (ref 0.1–1.3)
MONOCYTES NFR BLD AUTO: 4 %
MONOCYTES NFR BLD MANUAL: 4 % (ref 2–9)
NEUTROPHILS # BLD AUTO: 22.46 X10(3)/MCL (ref 2.1–9.2)
NEUTROPHILS NFR BLD AUTO: 92 %
NEUTS BAND NFR BLD MANUAL: 3 % (ref 0–10)
NRBC BLD MANUAL-RTO: 1 %
O2 HGB ARTERIAL: 92.2 % (ref 94–100)
PCO2 BLDA: 65 MMHG (ref 35–45)
PH SMN: 7.38 [PH] (ref 7.35–7.45)
PLATELET # BLD AUTO: 32 X10(3)/MCL (ref 130–400)
PLATELET # BLD AUTO: 77 X10(3)/MCL (ref 130–400)
PLATELET # BLD EST: ABNORMAL 10*3/UL
PMV BLD AUTO: 10 FL (ref 7.4–10.4)
PMV BLD AUTO: 11.6 FL (ref 7.4–10.4)
PO2 BLDA: 66 MMHG (ref 80–100)
POC ALLENS TEST: POSITIVE
POC BE: 11.1 (ref -2–2)
POC CO HGB: 3 %
POC CO2: 40.5 MMOL/L (ref 22–26)
POC MET HGB: 1.4 % (ref 0.4–1.5)
POC SAMPLESOURCE: ABNORMAL
POC SATURATED O2: 96.4 %
POC SITE: ABNORMAL
POC THB: 11.3 GM/DL (ref 12–16)
POC TREATMENT: ABNORMAL
POLYCHROMASIA BLD QL SMEAR: ABNORMAL
POTASSIUM SERPL-SCNC: 4.9 MMOL/L (ref 3.5–5.1)
PROT SERPL-MCNC: 5 GM/DL (ref 6.4–8.3)
RBC # BLD AUTO: 3.44 X10(6)/MCL (ref 4–5.2)
RBC # BLD AUTO: 3.98 X10(6)/MCL (ref 4–5.2)
RBC MORPH BLD: ABNORMAL
SETTING 1 ABG: ABNORMAL
SETTING 2 ABG: ABNORMAL
SETTING 3 ABG: 12
SETTING 4 ABG: ABNORMAL
SODIUM SERPL-SCNC: 149 MMOL/L (ref 136–145)
TRIGL SERPL-MCNC: 191 MG/DL (ref 37–140)
WBC # SPEC AUTO: 10.9 X10(3)/MCL (ref 4.5–11)
WBC # SPEC AUTO: 24.4 X10(3)/MCL (ref 4.5–11)

## 2021-01-03 LAB
ABS NEUT (OLG): 4.39 X10(3)/MCL (ref 2.1–9.2)
ABS NEUT (OLG): 5.1 X10(3)/MCL (ref 2.1–9.2)
ALBUMIN SERPL-MCNC: 2.2 GM/DL (ref 3.5–5)
ALBUMIN/GLOB SERPL: 1.2 RATIO (ref 1.1–2)
ALP SERPL-CCNC: 186 UNIT/L (ref 40–150)
ALT SERPL-CCNC: 1324 UNIT/L (ref 0–55)
ANISOCYTOSIS BLD QL SMEAR: ABNORMAL
AST SERPL-CCNC: 357 UNIT/L (ref 5–34)
BASOPHILS # BLD AUTO: 0 X10(3)/MCL (ref 0–0.2)
BASOPHILS NFR BLD AUTO: 0 %
BASOPHILS NFR BLD MANUAL: 0 %
BILIRUB SERPL-MCNC: 1.8 MG/DL
BILIRUBIN DIRECT+TOT PNL SERPL-MCNC: 0.6 MG/DL (ref 0–0.8)
BILIRUBIN DIRECT+TOT PNL SERPL-MCNC: 1.2 MG/DL (ref 0–0.5)
BUN SERPL-MCNC: 63 MG/DL (ref 9.8–20.1)
CALCIUM SERPL-MCNC: 7.7 MG/DL (ref 8.4–10.2)
CHLORIDE SERPL-SCNC: 116 MMOL/L (ref 98–107)
CO2 SERPL-SCNC: 31 MMOL/L (ref 22–29)
CREAT SERPL-MCNC: 0.83 MG/DL (ref 0.55–1.02)
EOSINOPHIL NFR BLD MANUAL: 0 %
ERYTHROCYTE [DISTWIDTH] IN BLOOD BY AUTOMATED COUNT: 20.6 % (ref 11.5–14.5)
ERYTHROCYTE [DISTWIDTH] IN BLOOD BY AUTOMATED COUNT: 21.1 % (ref 11.5–14.5)
FINAL CULTURE: NO GROWTH
GLOBULIN SER-MCNC: 1.9 GM/DL (ref 2.4–3.5)
GLUCOSE SERPL-MCNC: 138 MG/DL (ref 74–100)
GRANULOCYTES NFR BLD MANUAL: 93 % (ref 43–75)
HCO3 UR-SCNC: 40.2 MMOL/L (ref 22–26)
HCT VFR BLD AUTO: 27.1 % (ref 35–46)
HCT VFR BLD AUTO: 28.8 % (ref 35–46)
HGB BLD-MCNC: 7.9 GM/DL (ref 12–16)
HGB BLD-MCNC: 8.1 GM/DL (ref 12–16)
HYPOCHROMIA BLD QL SMEAR: ABNORMAL
IMM GRANULOCYTES # BLD AUTO: 0.06 10*3/UL
IMM GRANULOCYTES NFR BLD AUTO: 1 %
LYMPHOCYTES # BLD AUTO: 0.2 X10(3)/MCL (ref 0.6–4.6)
LYMPHOCYTES NFR BLD AUTO: 4 %
LYMPHOCYTES NFR BLD MANUAL: 2 % (ref 20.5–51.1)
MCH RBC QN AUTO: 27.2 PG (ref 26–34)
MCH RBC QN AUTO: 27.3 PG (ref 26–34)
MCHC RBC AUTO-ENTMCNC: 28.1 GM/DL (ref 31–37)
MCHC RBC AUTO-ENTMCNC: 29.2 GM/DL (ref 31–37)
MCV RBC AUTO: 93.4 FL (ref 80–100)
MCV RBC AUTO: 97 FL (ref 80–100)
MICROCYTES BLD QL SMEAR: ABNORMAL
MONOCYTES # BLD AUTO: 0.3 X10(3)/MCL (ref 0.1–1.3)
MONOCYTES NFR BLD AUTO: 5 %
MONOCYTES NFR BLD MANUAL: 2 % (ref 2–9)
NEUTROPHILS # BLD AUTO: 5.1 X10(3)/MCL (ref 2.1–9.2)
NEUTROPHILS NFR BLD AUTO: 90 %
NEUTS BAND NFR BLD MANUAL: 3 % (ref 0–10)
NRBC BLD MANUAL-RTO: 1 %
O2 HGB ARTERIAL: 95.4 % (ref 94–100)
PCO2 BLDA: 62 MMHG (ref 35–45)
PH SMN: 7.42 [PH] (ref 7.35–7.45)
PLATELET # BLD AUTO: 22 X10(3)/MCL (ref 130–400)
PLATELET # BLD AUTO: 27 X10(3)/MCL (ref 130–400)
PLATELET # BLD EST: ABNORMAL 10*3/UL
PMV BLD AUTO: 11 FL (ref 7.4–10.4)
PMV BLD AUTO: 12.2 FL (ref 7.4–10.4)
PO2 BLDA: 87 MMHG (ref 80–100)
POC ALLENS TEST: POSITIVE
POC BE: 13.9 (ref -2–2)
POC CO HGB: 2.6 %
POC CO2: 42.1 MMOL/L (ref 22–26)
POC MET HGB: 1.1 % (ref 0.4–1.5)
POC SAMPLESOURCE: ABNORMAL
POC SATURATED O2: 99.1 %
POC SITE: ABNORMAL
POC THB: 8.8 GM/DL (ref 12–16)
POC TREATMENT: ABNORMAL
POLYCHROMASIA BLD QL SMEAR: ABNORMAL
POTASSIUM SERPL-SCNC: 4.9 MMOL/L (ref 3.5–5.1)
PROT SERPL-MCNC: 4.1 GM/DL (ref 6.4–8.3)
RBC # BLD AUTO: 2.9 X10(6)/MCL (ref 4–5.2)
RBC # BLD AUTO: 2.97 X10(6)/MCL (ref 4–5.2)
RBC MORPH BLD: ABNORMAL
SETTING 1 ABG: ABNORMAL
SETTING 2 ABG: ABNORMAL
SETTING 3 ABG: 12
SETTING 4 ABG: ABNORMAL
SODIUM SERPL-SCNC: 154 MMOL/L (ref 136–145)
SODIUM SERPL-SCNC: 155 MMOL/L (ref 136–145)
SODIUM SERPL-SCNC: 155 MMOL/L (ref 136–145)
SODIUM SERPL-SCNC: 156 MMOL/L (ref 136–145)
TRIGL SERPL-MCNC: 137 MG/DL (ref 37–140)
VANCOMYCIN TROUGH SERPL-MCNC: 17.2 UG/ML (ref 15–20)
WBC # SPEC AUTO: 4.9 X10(3)/MCL (ref 4.5–11)
WBC # SPEC AUTO: 5.7 X10(3)/MCL (ref 4.5–11)

## 2021-01-04 LAB
ABS NEUT (OLG): 3.66 X10(3)/MCL (ref 2.1–9.2)
ABS NEUT (OLG): 6.23 X10(3)/MCL (ref 2.1–9.2)
ALBUMIN SERPL-MCNC: 2.3 GM/DL (ref 3.5–5)
ALBUMIN/GLOB SERPL: 1.2 RATIO (ref 1.1–2)
ALP SERPL-CCNC: 195 UNIT/L (ref 40–150)
ALT SERPL-CCNC: 961 UNIT/L (ref 0–55)
ANISOCYTOSIS BLD QL SMEAR: ABNORMAL
AST SERPL-CCNC: 195 UNIT/L (ref 5–34)
BASOPHILS # BLD AUTO: 0 X10(3)/MCL (ref 0–0.2)
BASOPHILS NFR BLD AUTO: 0 %
BASOPHILS NFR BLD MANUAL: 0 %
BILIRUB SERPL-MCNC: 2 MG/DL
BILIRUBIN DIRECT+TOT PNL SERPL-MCNC: 0.7 MG/DL (ref 0–0.8)
BILIRUBIN DIRECT+TOT PNL SERPL-MCNC: 1.3 MG/DL (ref 0–0.5)
BUN SERPL-MCNC: 58 MG/DL (ref 9.8–20.1)
CALCIUM SERPL-MCNC: 7.9 MG/DL (ref 8.4–10.2)
CHLORIDE SERPL-SCNC: 116 MMOL/L (ref 98–107)
CO2 SERPL-SCNC: 32 MMOL/L (ref 22–29)
CREAT SERPL-MCNC: 0.74 MG/DL (ref 0.55–1.02)
EOSINOPHIL NFR BLD MANUAL: 0 %
ERYTHROCYTE [DISTWIDTH] IN BLOOD BY AUTOMATED COUNT: 20.7 % (ref 11.5–14.5)
ERYTHROCYTE [DISTWIDTH] IN BLOOD BY AUTOMATED COUNT: 21 % (ref 11.5–14.5)
ERYTHROCYTE [SEDIMENTATION RATE] IN BLOOD: 6 MM/HR (ref 0–20)
GLOBULIN SER-MCNC: 1.9 GM/DL (ref 2.4–3.5)
GLUCOSE SERPL-MCNC: 212 MG/DL (ref 74–100)
GRANULOCYTES NFR BLD MANUAL: 91 % (ref 43–75)
HCO3 UR-SCNC: 39 MMOL/L (ref 22–26)
HCT VFR BLD AUTO: 27.4 % (ref 35–46)
HCT VFR BLD AUTO: 30 % (ref 35–46)
HGB BLD-MCNC: 8 GM/DL (ref 12–16)
HGB BLD-MCNC: 8.6 GM/DL (ref 12–16)
HYPOCHROMIA BLD QL SMEAR: ABNORMAL
IMM GRANULOCYTES # BLD AUTO: 0.1 10*3/UL
IMM GRANULOCYTES NFR BLD AUTO: 2 %
LYMPHOCYTES # BLD AUTO: 0.2 X10(3)/MCL (ref 0.6–4.6)
LYMPHOCYTES NFR BLD AUTO: 3 %
LYMPHOCYTES NFR BLD MANUAL: 5 % (ref 20.5–51.1)
MCH RBC QN AUTO: 27.5 PG (ref 26–34)
MCH RBC QN AUTO: 27.5 PG (ref 26–34)
MCHC RBC AUTO-ENTMCNC: 28.7 GM/DL (ref 31–37)
MCHC RBC AUTO-ENTMCNC: 29.2 GM/DL (ref 31–37)
MCV RBC AUTO: 94.2 FL (ref 80–100)
MCV RBC AUTO: 95.8 FL (ref 80–100)
MONOCYTES # BLD AUTO: 0.3 X10(3)/MCL (ref 0.1–1.3)
MONOCYTES NFR BLD AUTO: 4 %
MONOCYTES NFR BLD MANUAL: 2 % (ref 2–9)
NEUTROPHILS # BLD AUTO: 6.23 X10(3)/MCL (ref 2.1–9.2)
NEUTROPHILS NFR BLD AUTO: 91 %
NEUTS BAND NFR BLD MANUAL: 2 % (ref 0–10)
NRBC BLD MANUAL-RTO: 1 %
O2 HGB ARTERIAL: 95 % (ref 94–100)
PCO2 BLDA: 66 MMHG (ref 35–45)
PH SMN: 7.38 [PH] (ref 7.35–7.45)
PLATELET # BLD AUTO: 21 X10(3)/MCL (ref 130–400)
PLATELET # BLD AUTO: 24 X10(3)/MCL (ref 130–400)
PLATELET # BLD EST: ABNORMAL 10*3/UL
PMV BLD AUTO: 10.7 FL (ref 7.4–10.4)
PMV BLD AUTO: ABNORMAL FL (ref 7.4–10.4)
PO2 BLDA: 84 MMHG (ref 80–100)
POC ALLENS TEST: POSITIVE
POC BE: 12.1 (ref -2–2)
POC CO HGB: 3 %
POC CO2: 41 MMOL/L (ref 22–26)
POC MET HGB: 0.8 % (ref 0.4–1.5)
POC SAMPLESOURCE: ABNORMAL
POC SATURATED O2: 98.7 %
POC SITE: ABNORMAL
POC THB: 8.7 GM/DL (ref 12–16)
POC TREATMENT: ABNORMAL
POLYCHROMASIA BLD QL SMEAR: ABNORMAL
POTASSIUM SERPL-SCNC: 5 MMOL/L (ref 3.5–5.1)
PROT SERPL-MCNC: 4.2 GM/DL (ref 6.4–8.3)
RBC # BLD AUTO: 2.91 X10(6)/MCL (ref 4–5.2)
RBC # BLD AUTO: 3.13 X10(6)/MCL (ref 4–5.2)
RBC MORPH BLD: ABNORMAL
SETTING 1 ABG: ABNORMAL
SETTING 2 ABG: ABNORMAL
SODIUM SERPL-SCNC: 155 MMOL/L (ref 136–145)
SODIUM SERPL-SCNC: 156 MMOL/L (ref 136–145)
TRANSFUSION ORDER: NORMAL
TRIGL SERPL-MCNC: 125 MG/DL (ref 37–140)
WBC # SPEC AUTO: 4.3 X10(3)/MCL (ref 4.5–11)
WBC # SPEC AUTO: 6.8 X10(3)/MCL (ref 4.5–11)

## 2021-01-05 LAB
ABS NEUT (OLG): 3.98 X10(3)/MCL (ref 2.1–9.2)
ABS NEUT (OLG): 4.93 X10(3)/MCL (ref 2.1–9.2)
ALBUMIN SERPL-MCNC: 2.5 GM/DL (ref 3.5–5)
ALBUMIN/GLOB SERPL: 1.1 RATIO (ref 1.1–2)
ALP SERPL-CCNC: 196 UNIT/L (ref 40–150)
ALT SERPL-CCNC: 723 UNIT/L (ref 0–55)
ANISOCYTOSIS BLD QL SMEAR: ABNORMAL
AST SERPL-CCNC: 145 UNIT/L (ref 5–34)
BASOPHILS # BLD AUTO: 0 X10(3)/MCL (ref 0–0.2)
BASOPHILS NFR BLD AUTO: 0 %
BASOPHILS NFR BLD MANUAL: 0 %
BILIRUB SERPL-MCNC: 1.7 MG/DL
BILIRUBIN DIRECT+TOT PNL SERPL-MCNC: 0.8 MG/DL (ref 0–0.8)
BILIRUBIN DIRECT+TOT PNL SERPL-MCNC: 0.9 MG/DL (ref 0–0.5)
BUN SERPL-MCNC: 48 MG/DL (ref 9.8–20.1)
CALCIUM SERPL-MCNC: 8.2 MG/DL (ref 8.4–10.2)
CHLORIDE SERPL-SCNC: 116 MMOL/L (ref 98–107)
CO2 SERPL-SCNC: 30 MMOL/L (ref 22–29)
CREAT SERPL-MCNC: 0.7 MG/DL (ref 0.55–1.02)
EOSINOPHIL NFR BLD MANUAL: 0 %
ERYTHROCYTE [DISTWIDTH] IN BLOOD BY AUTOMATED COUNT: 21.5 % (ref 11.5–14.5)
ERYTHROCYTE [DISTWIDTH] IN BLOOD BY AUTOMATED COUNT: 21.9 % (ref 11.5–14.5)
GLOBULIN SER-MCNC: 2.3 GM/DL (ref 2.4–3.5)
GLUCOSE SERPL-MCNC: 116 MG/DL (ref 74–100)
GRANULOCYTES NFR BLD MANUAL: 92 % (ref 43–75)
HCO3 UR-SCNC: 38.4 MMOL/L (ref 22–26)
HCT VFR BLD AUTO: 28.8 % (ref 35–46)
HCT VFR BLD AUTO: 30.1 % (ref 35–46)
HGB BLD-MCNC: 8.3 GM/DL (ref 12–16)
HGB BLD-MCNC: 9.1 GM/DL (ref 12–16)
HYPOCHROMIA BLD QL SMEAR: ABNORMAL
IMM GRANULOCYTES # BLD AUTO: 0.09 10*3/UL
IMM GRANULOCYTES NFR BLD AUTO: 2 %
INR PPP: 1.36 (ref 0.9–1.2)
LYMPHOCYTES # BLD AUTO: 0.2 X10(3)/MCL (ref 0.6–4.6)
LYMPHOCYTES NFR BLD AUTO: 3 %
LYMPHOCYTES NFR BLD MANUAL: 3 % (ref 20.5–51.1)
MCH RBC QN AUTO: 27.3 PG (ref 26–34)
MCH RBC QN AUTO: 27.9 PG (ref 26–34)
MCHC RBC AUTO-ENTMCNC: 28.8 GM/DL (ref 31–37)
MCHC RBC AUTO-ENTMCNC: 30.2 GM/DL (ref 31–37)
MCV RBC AUTO: 92.3 FL (ref 80–100)
MCV RBC AUTO: 94.7 FL (ref 80–100)
MONOCYTES # BLD AUTO: 0.3 X10(3)/MCL (ref 0.1–1.3)
MONOCYTES NFR BLD AUTO: 5 %
MONOCYTES NFR BLD MANUAL: 3 % (ref 2–9)
NEUTROPHILS # BLD AUTO: 4.93 X10(3)/MCL (ref 2.1–9.2)
NEUTROPHILS NFR BLD AUTO: 90 %
NEUTS BAND NFR BLD MANUAL: 2 % (ref 0–10)
O2 HGB ARTERIAL: 88.8 % (ref 94–100)
OSMOLALITY SERPL: 333 MOSM/KG (ref 280–300)
OSMOLALITY UR: 387 MOSM/KG (ref 300–1300)
PCO2 BLDA: 54 MMHG (ref 35–45)
PH SMN: 7.46 [PH] (ref 7.35–7.45)
PLATELET # BLD AUTO: 37 X10(3)/MCL (ref 130–400)
PLATELET # BLD AUTO: 51 X10(3)/MCL (ref 130–400)
PLATELET # BLD EST: ABNORMAL 10*3/UL
PMV BLD AUTO: 11 FL (ref 7.4–10.4)
PMV BLD AUTO: ABNORMAL FL (ref 7.4–10.4)
PO2 BLDA: 54 MMHG (ref 80–100)
POC ALLENS TEST: POSITIVE
POC BE: 12.8 (ref -2–2)
POC CO HGB: 3.2 %
POC CO2: 40.1 MMOL/L (ref 22–26)
POC MET HGB: 1 % (ref 0.4–1.5)
POC SAMPLESOURCE: ABNORMAL
POC SATURATED O2: 92.7 %
POC SITE: ABNORMAL
POC THB: 9.9 GM/DL (ref 12–16)
POC TREATMENT: ABNORMAL
POLYCHROMASIA BLD QL SMEAR: ABNORMAL
POTASSIUM SERPL-SCNC: 5.1 MMOL/L (ref 3.5–5.1)
PROT SERPL-MCNC: 4.8 GM/DL (ref 6.4–8.3)
PROTHROMBIN TIME: 16.4 SECOND(S) (ref 11.9–14.4)
RBC # BLD AUTO: 3.04 X10(6)/MCL (ref 4–5.2)
RBC # BLD AUTO: 3.26 X10(6)/MCL (ref 4–5.2)
RBC MORPH BLD: ABNORMAL
SETTING 1 ABG: ABNORMAL
SETTING 2 ABG: ABNORMAL
SODIUM SERPL-SCNC: 155 MMOL/L (ref 136–145)
SODIUM UR-SCNC: <20 MMOL/L
TRIGL SERPL-MCNC: 93 MG/DL (ref 37–140)
VANCOMYCIN TROUGH SERPL-MCNC: 15.3 UG/ML (ref 15–20)
WBC # SPEC AUTO: 4.3 X10(3)/MCL (ref 4.5–11)
WBC # SPEC AUTO: 5.4 X10(3)/MCL (ref 4.5–11)

## 2021-01-06 LAB
ABS NEUT (OLG): 2.23 X10(3)/MCL (ref 2.1–9.2)
ABS NEUT (OLG): 5.07 X10(3)/MCL (ref 2.1–9.2)
ALBUMIN SERPL-MCNC: 1.9 GM/DL (ref 3.5–5)
ALBUMIN/GLOB SERPL: 1 RATIO (ref 1.1–2)
ALP SERPL-CCNC: 146 UNIT/L (ref 40–150)
ALT SERPL-CCNC: 409 UNIT/L (ref 0–55)
ANISOCYTOSIS BLD QL SMEAR: ABNORMAL
ANISOCYTOSIS BLD QL SMEAR: ABNORMAL
AST SERPL-CCNC: 94 UNIT/L (ref 5–34)
BASOPHILS NFR BLD MANUAL: 0 %
BASOPHILS NFR BLD MANUAL: 0 %
BILIRUB SERPL-MCNC: 1.2 MG/DL
BILIRUBIN DIRECT+TOT PNL SERPL-MCNC: 0.4 MG/DL (ref 0–0.8)
BILIRUBIN DIRECT+TOT PNL SERPL-MCNC: 0.8 MG/DL (ref 0–0.5)
BUN SERPL-MCNC: 40 MG/DL (ref 9.8–20.1)
BUN SERPL-MCNC: 49 MG/DL (ref 9.8–20.1)
CALCIUM SERPL-MCNC: 7.3 MG/DL (ref 8.4–10.2)
CALCIUM SERPL-MCNC: 7.6 MG/DL (ref 8.4–10.2)
CHLORIDE SERPL-SCNC: 103 MMOL/L (ref 98–107)
CHLORIDE SERPL-SCNC: 115 MMOL/L (ref 98–107)
CO2 SERPL-SCNC: 31 MMOL/L (ref 22–29)
CO2 SERPL-SCNC: 34 MMOL/L (ref 22–29)
CREAT SERPL-MCNC: 0.76 MG/DL (ref 0.55–1.02)
CREAT SERPL-MCNC: 0.77 MG/DL (ref 0.55–1.02)
CREAT/UREA NIT SERPL: 53
CROSSMATCH INTERPRETATION: NORMAL
EOSINOPHIL NFR BLD MANUAL: 0 %
EOSINOPHIL NFR BLD MANUAL: 0 %
ERYTHROCYTE [DISTWIDTH] IN BLOOD BY AUTOMATED COUNT: 20 % (ref 11.5–14.5)
ERYTHROCYTE [DISTWIDTH] IN BLOOD BY AUTOMATED COUNT: 21.8 % (ref 11.5–14.5)
ERYTHROCYTE [SEDIMENTATION RATE] IN BLOOD: 11 MM/HR (ref 0–20)
FINAL CULTURE: NORMAL
FINAL CULTURE: NORMAL
GLOBULIN SER-MCNC: 1.9 GM/DL (ref 2.4–3.5)
GLUCOSE SERPL-MCNC: 490 MG/DL (ref 74–100)
GLUCOSE SERPL-MCNC: 73 MG/DL (ref 74–100)
GRANULOCYTES NFR BLD MANUAL: 90 % (ref 43–75)
GRANULOCYTES NFR BLD MANUAL: 90 % (ref 43–75)
HCO3 UR-SCNC: 38.4 MMOL/L (ref 22–26)
HCT VFR BLD AUTO: 24.6 % (ref 35–46)
HCT VFR BLD AUTO: 31.3 % (ref 35–46)
HGB BLD-MCNC: 7 GM/DL (ref 12–16)
HGB BLD-MCNC: 9.5 GM/DL (ref 12–16)
HYPOCHROMIA BLD QL SMEAR: ABNORMAL
INR PPP: 1.38 (ref 0.9–1.2)
LYMPHOCYTES NFR BLD MANUAL: 3 % (ref 20.5–51.1)
LYMPHOCYTES NFR BLD MANUAL: 6 % (ref 20.5–51.1)
MCH RBC QN AUTO: 27.3 PG (ref 26–34)
MCH RBC QN AUTO: 28.6 PG (ref 26–34)
MCHC RBC AUTO-ENTMCNC: 28.5 GM/DL (ref 31–37)
MCHC RBC AUTO-ENTMCNC: 30.4 GM/DL (ref 31–37)
MCV RBC AUTO: 94.3 FL (ref 80–100)
MCV RBC AUTO: 96.1 FL (ref 80–100)
MONOCYTES NFR BLD MANUAL: 1 % (ref 2–9)
MONOCYTES NFR BLD MANUAL: 1 % (ref 2–9)
NEUTS BAND NFR BLD MANUAL: 3 % (ref 0–10)
NEUTS BAND NFR BLD MANUAL: 6 % (ref 0–10)
NRBC BLD MANUAL-RTO: 1 %
O2 HGB ARTERIAL: 93.9 % (ref 94–100)
PCO2 BLDA: 54 MMHG (ref 35–45)
PH SMN: 7.46 [PH] (ref 7.35–7.45)
PLATELET # BLD AUTO: 29 X10(3)/MCL (ref 130–400)
PLATELET # BLD AUTO: 30 X10(3)/MCL (ref 130–400)
PLATELET # BLD EST: ABNORMAL 10*3/UL
PLATELET # BLD EST: ABNORMAL 10*3/UL
PMV BLD AUTO: 11.4 FL (ref 7.4–10.4)
PMV BLD AUTO: 11.6 FL (ref 7.4–10.4)
PO2 BLDA: 67 MMHG (ref 80–100)
POC ALLENS TEST: POSITIVE
POC BE: 13.2 (ref -2–2)
POC CO HGB: 3.1 %
POC CO2: 40.1 MMOL/L (ref 22–26)
POC MET HGB: 0.8 % (ref 0.4–1.5)
POC SAMPLESOURCE: ABNORMAL
POC SATURATED O2: 97.8 %
POC SITE: ABNORMAL
POC THB: 7.5 GM/DL (ref 12–16)
POC TREATMENT: ABNORMAL
POLYCHROMASIA BLD QL SMEAR: ABNORMAL
POLYCHROMASIA BLD QL SMEAR: ABNORMAL
POTASSIUM SERPL-SCNC: 4.8 MMOL/L (ref 3.5–5.1)
POTASSIUM SERPL-SCNC: 5.1 MMOL/L (ref 3.5–5.1)
PRODUCT READY: NORMAL
PROT SERPL-MCNC: 3.8 GM/DL (ref 6.4–8.3)
PROTHROMBIN TIME: 16.5 SECOND(S) (ref 11.9–14.4)
RBC # BLD AUTO: 2.56 X10(6)/MCL (ref 4–5.2)
RBC # BLD AUTO: 3.32 X10(6)/MCL (ref 4–5.2)
RBC MORPH BLD: ABNORMAL
RBC MORPH BLD: ABNORMAL
SETTING 1 ABG: ABNORMAL
SETTING 2 ABG: ABNORMAL
SETTING 3 ABG: ABNORMAL
SODIUM SERPL-SCNC: 138 MMOL/L (ref 136–145)
SODIUM SERPL-SCNC: 149 MMOL/L (ref 136–145)
SODIUM SERPL-SCNC: 153 MMOL/L (ref 136–145)
TRANSFUSION ORDER: NORMAL
TRIGL SERPL-MCNC: 74 MG/DL (ref 37–140)
WBC # SPEC AUTO: 2.5 X10(3)/MCL (ref 4.5–11)
WBC # SPEC AUTO: 5.4 X10(3)/MCL (ref 4.5–11)

## 2021-01-07 LAB
ABS NEUT (OLG): 5.32 X10(3)/MCL (ref 2.1–9.2)
ABS NEUT (OLG): 9.27 X10(3)/MCL (ref 2.1–9.2)
ALBUMIN SERPL-MCNC: 2.2 GM/DL (ref 3.5–5)
ALBUMIN/GLOB SERPL: 0.9 RATIO (ref 1.1–2)
ALP SERPL-CCNC: 216 UNIT/L (ref 40–150)
ALT SERPL-CCNC: 379 UNIT/L (ref 0–55)
ANISOCYTOSIS BLD QL SMEAR: ABNORMAL
AST SERPL-CCNC: 101 UNIT/L (ref 5–34)
BASOPHILS NFR BLD MANUAL: 0 %
BILIRUB SERPL-MCNC: 1.5 MG/DL
BILIRUBIN DIRECT+TOT PNL SERPL-MCNC: 0.5 MG/DL (ref 0–0.8)
BILIRUBIN DIRECT+TOT PNL SERPL-MCNC: 1 MG/DL (ref 0–0.5)
BUN SERPL-MCNC: 40 MG/DL (ref 9.8–20.1)
CALCIUM SERPL-MCNC: 7.5 MG/DL (ref 8.4–10.2)
CHLORIDE SERPL-SCNC: 104 MMOL/L (ref 98–107)
CO2 SERPL-SCNC: 31 MMOL/L (ref 22–29)
CREAT SERPL-MCNC: 0.77 MG/DL (ref 0.55–1.02)
EOSINOPHIL NFR BLD MANUAL: 0 %
ERYTHROCYTE [DISTWIDTH] IN BLOOD BY AUTOMATED COUNT: 20.1 % (ref 11.5–14.5)
ERYTHROCYTE [DISTWIDTH] IN BLOOD BY AUTOMATED COUNT: 20.8 % (ref 11.5–14.5)
GLOBULIN SER-MCNC: 2.4 GM/DL (ref 2.4–3.5)
GLUCOSE SERPL-MCNC: 385 MG/DL (ref 74–100)
GRANULOCYTES NFR BLD MANUAL: 88 % (ref 43–75)
HCT VFR BLD AUTO: 33.7 % (ref 35–46)
HCT VFR BLD AUTO: 34.2 % (ref 35–46)
HGB BLD-MCNC: 10.2 GM/DL (ref 12–16)
HGB BLD-MCNC: 10.2 GM/DL (ref 12–16)
HYPOCHROMIA BLD QL SMEAR: ABNORMAL
IMM GRANULOCYTES # BLD AUTO: 0.04 10*3/UL
IMM GRANULOCYTES NFR BLD AUTO: 1 %
LYMPHOCYTES # BLD AUTO: 0.1 X10(3)/MCL (ref 0.6–4.6)
LYMPHOCYTES NFR BLD AUTO: 2 %
LYMPHOCYTES NFR BLD MANUAL: 4 % (ref 20.5–51.1)
MCH RBC QN AUTO: 28.1 PG (ref 26–34)
MCH RBC QN AUTO: 28.7 PG (ref 26–34)
MCHC RBC AUTO-ENTMCNC: 29.8 GM/DL (ref 31–37)
MCHC RBC AUTO-ENTMCNC: 30.3 GM/DL (ref 31–37)
MCV RBC AUTO: 94.2 FL (ref 80–100)
MCV RBC AUTO: 94.9 FL (ref 80–100)
MONOCYTES # BLD AUTO: 0.2 X10(3)/MCL (ref 0.1–1.3)
MONOCYTES NFR BLD AUTO: 3 %
MONOCYTES NFR BLD MANUAL: 1 % (ref 2–9)
NEUTROPHILS # BLD AUTO: 5.32 X10(3)/MCL (ref 2.1–9.2)
NEUTROPHILS NFR BLD AUTO: 94 %
NEUTS BAND NFR BLD MANUAL: 7 % (ref 0–10)
PLATELET # BLD AUTO: 32 X10(3)/MCL (ref 130–400)
PLATELET # BLD AUTO: 49 X10(3)/MCL (ref 130–400)
PLATELET # BLD EST: ABNORMAL 10*3/UL
PMV BLD AUTO: 11.7 FL (ref 7.4–10.4)
PMV BLD AUTO: 12.2 FL (ref 7.4–10.4)
POIKILOCYTOSIS BLD QL SMEAR: ABNORMAL
POTASSIUM SERPL-SCNC: 4.6 MMOL/L (ref 3.5–5.1)
PROT SERPL-MCNC: 4.6 GM/DL (ref 6.4–8.3)
RBC # BLD AUTO: 3.55 X10(6)/MCL (ref 4–5.2)
RBC # BLD AUTO: 3.63 X10(6)/MCL (ref 4–5.2)
RBC MORPH BLD: ABNORMAL
SODIUM SERPL-SCNC: 139 MMOL/L (ref 136–145)
SODIUM SERPL-SCNC: 147 MMOL/L (ref 136–145)
TRIGL SERPL-MCNC: 182 MG/DL (ref 37–140)
VANCOMYCIN SERPL-MCNC: 13.7 UG/ML (ref 15–20)
WBC # SPEC AUTO: 5.6 X10(3)/MCL (ref 4.5–11)
WBC # SPEC AUTO: 9.8 X10(3)/MCL (ref 4.5–11)

## 2021-01-08 LAB
ABS NEUT (OLG): 4.16 X10(3)/MCL (ref 2.1–9.2)
ABS NEUT (OLG): 5.05 X10(3)/MCL (ref 2.1–9.2)
ALBUMIN SERPL-MCNC: 2.2 GM/DL (ref 3.5–5)
ALBUMIN/GLOB SERPL: 0.8 RATIO (ref 1.1–2)
ALP SERPL-CCNC: 227 UNIT/L (ref 40–150)
ALT SERPL-CCNC: 322 UNIT/L (ref 0–55)
ANISOCYTOSIS BLD QL SMEAR: NORMAL
AST SERPL-CCNC: 104 UNIT/L (ref 5–34)
BASOPHILS # BLD AUTO: 0 X10(3)/MCL (ref 0–0.2)
BASOPHILS NFR BLD AUTO: 0 %
BILIRUB SERPL-MCNC: 1.4 MG/DL
BILIRUBIN DIRECT+TOT PNL SERPL-MCNC: 0.5 MG/DL (ref 0–0.8)
BILIRUBIN DIRECT+TOT PNL SERPL-MCNC: 0.9 MG/DL (ref 0–0.5)
BUN SERPL-MCNC: 39 MG/DL (ref 9.8–20.1)
CALCIUM SERPL-MCNC: 7.9 MG/DL (ref 8.4–10.2)
CHLORIDE SERPL-SCNC: 110 MMOL/L (ref 98–107)
CO2 SERPL-SCNC: 30 MMOL/L (ref 22–29)
CREAT SERPL-MCNC: 0.67 MG/DL (ref 0.55–1.02)
ERYTHROCYTE [DISTWIDTH] IN BLOOD BY AUTOMATED COUNT: 20.3 % (ref 11.5–14.5)
ERYTHROCYTE [DISTWIDTH] IN BLOOD BY AUTOMATED COUNT: 20.5 % (ref 11.5–14.5)
ERYTHROCYTE [SEDIMENTATION RATE] IN BLOOD: 7 MM/HR (ref 0–20)
FINAL CULTURE: NO GROWTH
FINAL CULTURE: NO GROWTH
GLOBULIN SER-MCNC: 2.6 GM/DL (ref 2.4–3.5)
GLUCOSE SERPL-MCNC: 96 MG/DL (ref 74–100)
GRAM STN SPEC: NORMAL
HCO3 UR-SCNC: 34.9 MMOL/L (ref 22–26)
HCT VFR BLD AUTO: 32.2 % (ref 35–46)
HCT VFR BLD AUTO: 37.8 % (ref 35–46)
HGB BLD-MCNC: 11.4 GM/DL (ref 12–16)
HGB BLD-MCNC: 9.6 GM/DL (ref 12–16)
HYPOCHROMIA BLD QL SMEAR: NORMAL
IMM GRANULOCYTES # BLD AUTO: 0.03 10*3/UL
IMM GRANULOCYTES # BLD AUTO: 0.09 10*3/UL
IMM GRANULOCYTES NFR BLD AUTO: 1 %
IMM GRANULOCYTES NFR BLD AUTO: 2 %
INR PPP: 1.19 (ref 0.9–1.2)
LYMPHOCYTES # BLD AUTO: 0.1 X10(3)/MCL (ref 0.6–4.6)
LYMPHOCYTES # BLD AUTO: 0.1 X10(3)/MCL (ref 0.6–4.6)
LYMPHOCYTES NFR BLD AUTO: 2 %
LYMPHOCYTES NFR BLD AUTO: 2 %
MCH RBC QN AUTO: 28.4 PG (ref 26–34)
MCH RBC QN AUTO: 28.7 PG (ref 26–34)
MCHC RBC AUTO-ENTMCNC: 29.8 GM/DL (ref 31–37)
MCHC RBC AUTO-ENTMCNC: 30.2 GM/DL (ref 31–37)
MCV RBC AUTO: 94 FL (ref 80–100)
MCV RBC AUTO: 96.1 FL (ref 80–100)
MONOCYTES # BLD AUTO: 0.2 X10(3)/MCL (ref 0.1–1.3)
MONOCYTES # BLD AUTO: 0.2 X10(3)/MCL (ref 0.1–1.3)
MONOCYTES NFR BLD AUTO: 3 %
MONOCYTES NFR BLD AUTO: 5 %
NEUTROPHILS # BLD AUTO: 4.16 X10(3)/MCL (ref 2.1–9.2)
NEUTROPHILS # BLD AUTO: 5.05 X10(3)/MCL (ref 2.1–9.2)
NEUTROPHILS NFR BLD AUTO: 91 %
NEUTROPHILS NFR BLD AUTO: 94 %
O2 HGB ARTERIAL: 90.5 % (ref 94–100)
PCO2 BLDA: 59 MMHG (ref 35–45)
PH SMN: 7.38 [PH] (ref 7.35–7.45)
PLATELET # BLD AUTO: 36 X10(3)/MCL (ref 130–400)
PLATELET # BLD AUTO: 36 X10(3)/MCL (ref 130–400)
PLATELET # BLD EST: NORMAL 10*3/UL
PMV BLD AUTO: 11.5 FL (ref 7.4–10.4)
PMV BLD AUTO: ABNORMAL FL (ref 7.4–10.4)
PO2 BLDA: 62 MMHG (ref 80–100)
POC ALLENS TEST: POSITIVE
POC BE: 8.3 (ref -2–2)
POC CO HGB: 3.1 %
POC CO2: 36.7 MMOL/L (ref 22–26)
POC MET HGB: 0.9 % (ref 0.4–1.5)
POC SAMPLESOURCE: ABNORMAL
POC SATURATED O2: 94.3 %
POC SITE: ABNORMAL
POC THB: 10 GM/DL (ref 12–16)
POC TREATMENT: ABNORMAL
POLYCHROMASIA BLD QL SMEAR: NORMAL
POTASSIUM SERPL-SCNC: 5.1 MMOL/L (ref 3.5–5.1)
PROT SERPL-MCNC: 4.8 GM/DL (ref 6.4–8.3)
PROTHROMBIN TIME: 14.7 SECOND(S) (ref 11.9–14.4)
RBC # BLD AUTO: 3.35 X10(6)/MCL (ref 4–5.2)
RBC # BLD AUTO: 4.02 X10(6)/MCL (ref 4–5.2)
RBC MORPH BLD: NORMAL
SETTING 1 ABG: ABNORMAL
SETTING 2 ABG: ABNORMAL
SETTING 3 ABG: ABNORMAL
SODIUM SERPL-SCNC: 137 MMOL/L (ref 136–145)
SODIUM SERPL-SCNC: 146 MMOL/L (ref 136–145)
TRIGL SERPL-MCNC: 154 MG/DL (ref 37–140)
WBC # SPEC AUTO: 4.6 X10(3)/MCL (ref 4.5–11)
WBC # SPEC AUTO: 5.4 X10(3)/MCL (ref 4.5–11)

## 2021-01-09 LAB
ABS NEUT (OLG): 3.5 X10(3)/MCL (ref 2.1–9.2)
ALBUMIN SERPL-MCNC: 2 GM/DL (ref 3.5–5)
ALBUMIN/GLOB SERPL: 0.8 RATIO (ref 1.1–2)
ALP SERPL-CCNC: 263 UNIT/L (ref 40–150)
ALT SERPL-CCNC: 252 UNIT/L (ref 0–55)
AST SERPL-CCNC: 96 UNIT/L (ref 5–34)
BASOPHILS # BLD AUTO: 0 X10(3)/MCL (ref 0–0.2)
BASOPHILS NFR BLD AUTO: 0 %
BILIRUB SERPL-MCNC: 1.7 MG/DL
BILIRUBIN DIRECT+TOT PNL SERPL-MCNC: 0.6 MG/DL (ref 0–0.8)
BILIRUBIN DIRECT+TOT PNL SERPL-MCNC: 1.1 MG/DL (ref 0–0.5)
BUN SERPL-MCNC: 41 MG/DL (ref 9.8–20.1)
CALCIUM SERPL-MCNC: 7.9 MG/DL (ref 8.4–10.2)
CHLORIDE SERPL-SCNC: 106 MMOL/L (ref 98–107)
CO2 SERPL-SCNC: 27 MMOL/L (ref 22–29)
CREAT SERPL-MCNC: 0.76 MG/DL (ref 0.55–1.02)
ERYTHROCYTE [DISTWIDTH] IN BLOOD BY AUTOMATED COUNT: 20.6 % (ref 11.5–14.5)
GLOBULIN SER-MCNC: 2.5 GM/DL (ref 2.4–3.5)
GLUCOSE SERPL-MCNC: 321 MG/DL (ref 74–100)
HCT VFR BLD AUTO: 34.5 % (ref 35–46)
HGB BLD-MCNC: 10.5 GM/DL (ref 12–16)
IMM GRANULOCYTES # BLD AUTO: 0.05 10*3/UL
IMM GRANULOCYTES NFR BLD AUTO: 1 %
LYMPHOCYTES # BLD AUTO: 0.1 X10(3)/MCL (ref 0.6–4.6)
LYMPHOCYTES NFR BLD AUTO: 2 %
MCH RBC QN AUTO: 28.8 PG (ref 26–34)
MCHC RBC AUTO-ENTMCNC: 30.4 GM/DL (ref 31–37)
MCV RBC AUTO: 94.5 FL (ref 80–100)
MONOCYTES # BLD AUTO: 0.2 X10(3)/MCL (ref 0.1–1.3)
MONOCYTES NFR BLD AUTO: 5 %
NEUTROPHILS # BLD AUTO: 3.5 X10(3)/MCL (ref 2.1–9.2)
NEUTROPHILS NFR BLD AUTO: 92 %
PLATELET # BLD AUTO: 36 X10(3)/MCL (ref 130–400)
PMV BLD AUTO: 11.5 FL (ref 7.4–10.4)
POTASSIUM SERPL-SCNC: 5.2 MMOL/L (ref 3.5–5.1)
PROT SERPL-MCNC: 4.5 GM/DL (ref 6.4–8.3)
RBC # BLD AUTO: 3.65 X10(6)/MCL (ref 4–5.2)
SODIUM SERPL-SCNC: 141 MMOL/L (ref 136–145)
SODIUM SERPL-SCNC: 143 MMOL/L (ref 136–145)
TRIGL SERPL-MCNC: 282 MG/DL (ref 37–140)
VANCOMYCIN TROUGH SERPL-MCNC: 14.1 UG/ML (ref 15–20)
WBC # SPEC AUTO: 3.8 X10(3)/MCL (ref 4.5–11)

## 2021-01-10 LAB
ABS NEUT (OLG): 2.87 X10(3)/MCL (ref 2.1–9.2)
ALBUMIN SERPL-MCNC: 2 GM/DL (ref 3.5–5)
ALBUMIN/GLOB SERPL: 0.8 RATIO (ref 1.1–2)
ALP SERPL-CCNC: 236 UNIT/L (ref 40–150)
ALT SERPL-CCNC: 220 UNIT/L (ref 0–55)
AST SERPL-CCNC: 80 UNIT/L (ref 5–34)
BILIRUB SERPL-MCNC: 1.2 MG/DL
BILIRUBIN DIRECT+TOT PNL SERPL-MCNC: 0.4 MG/DL (ref 0–0.8)
BILIRUBIN DIRECT+TOT PNL SERPL-MCNC: 0.8 MG/DL (ref 0–0.5)
BUN SERPL-MCNC: 39 MG/DL (ref 9.8–20.1)
CALCIUM SERPL-MCNC: 7.8 MG/DL (ref 8.4–10.2)
CHLORIDE SERPL-SCNC: 106 MMOL/L (ref 98–107)
CO2 SERPL-SCNC: 27 MMOL/L (ref 22–29)
CREAT SERPL-MCNC: 0.68 MG/DL (ref 0.55–1.02)
ERYTHROCYTE [DISTWIDTH] IN BLOOD BY AUTOMATED COUNT: 20 % (ref 11.5–14.5)
ERYTHROCYTE [SEDIMENTATION RATE] IN BLOOD: 8 MM/HR (ref 0–20)
GLOBULIN SER-MCNC: 2.5 GM/DL (ref 2.4–3.5)
GLUCOSE SERPL-MCNC: 252 MG/DL (ref 74–100)
HCO3 UR-SCNC: 31.5 MMOL/L (ref 22–26)
HCT VFR BLD AUTO: 33.4 % (ref 35–46)
HGB BLD-MCNC: 10 GM/DL (ref 12–16)
IMM GRANULOCYTES # BLD AUTO: 0.06 10*3/UL
IMM GRANULOCYTES NFR BLD AUTO: 2 %
INR PPP: 1.13 (ref 0.9–1.2)
LYMPHOCYTES # BLD AUTO: 0.1 X10(3)/MCL (ref 0.6–4.6)
LYMPHOCYTES NFR BLD AUTO: 3 %
MCH RBC QN AUTO: 28.6 PG (ref 26–34)
MCHC RBC AUTO-ENTMCNC: 29.9 GM/DL (ref 31–37)
MCV RBC AUTO: 95.4 FL (ref 80–100)
MONOCYTES # BLD AUTO: 0.1 X10(3)/MCL (ref 0.1–1.3)
MONOCYTES NFR BLD AUTO: 4 %
NEUTROPHILS # BLD AUTO: 2.87 X10(3)/MCL (ref 2.1–9.2)
NEUTROPHILS NFR BLD AUTO: 91 %
O2 HGB ARTERIAL: 91.7 % (ref 94–100)
PCO2 BLDA: 52 MMHG (ref 35–45)
PH SMN: 7.39 [PH] (ref 7.35–7.45)
PLATELET # BLD AUTO: 43 X10(3)/MCL (ref 130–400)
PMV BLD AUTO: 12.4 FL (ref 7.4–10.4)
PO2 BLDA: 65 MMHG (ref 80–100)
POC ALLENS TEST: POSITIVE
POC BE: 5.6 (ref -2–2)
POC CO HGB: 3.5 %
POC CO2: 33.1 MMOL/L (ref 22–26)
POC MET HGB: 1.1 % (ref 0.4–1.5)
POC SAMPLESOURCE: ABNORMAL
POC SATURATED O2: 96 %
POC SITE: ABNORMAL
POC THB: 9.5 GM/DL (ref 12–16)
POC TREATMENT: ABNORMAL
POTASSIUM SERPL-SCNC: 4.8 MMOL/L (ref 3.5–5.1)
PROT SERPL-MCNC: 4.5 GM/DL (ref 6.4–8.3)
PROTHROMBIN TIME: 14.1 SECOND(S) (ref 11.9–14.4)
RBC # BLD AUTO: 3.5 X10(6)/MCL (ref 4–5.2)
SETTING 1 ABG: ABNORMAL
SETTING 2 ABG: ABNORMAL
SETTING 3 ABG: ABNORMAL
SODIUM SERPL-SCNC: 140 MMOL/L (ref 136–145)
TRIGL SERPL-MCNC: 223 MG/DL (ref 37–140)
WBC # SPEC AUTO: 3.2 X10(3)/MCL (ref 4.5–11)

## 2021-01-11 LAB
ABS NEUT (OLG): 12.05 X10(3)/MCL (ref 2.1–9.2)
ALBUMIN SERPL-MCNC: 2.2 GM/DL (ref 3.5–5)
ALBUMIN/GLOB SERPL: 0.8 RATIO (ref 1.1–2)
ALP SERPL-CCNC: 326 UNIT/L (ref 40–150)
ALT SERPL-CCNC: 236 UNIT/L (ref 0–55)
AST SERPL-CCNC: 102 UNIT/L (ref 5–34)
BASOPHILS # BLD AUTO: 0 X10(3)/MCL (ref 0–0.2)
BASOPHILS NFR BLD AUTO: 0 %
BILIRUB SERPL-MCNC: 2.4 MG/DL
BILIRUBIN DIRECT+TOT PNL SERPL-MCNC: 0.6 MG/DL (ref 0–0.8)
BILIRUBIN DIRECT+TOT PNL SERPL-MCNC: 1.8 MG/DL (ref 0–0.5)
BUN SERPL-MCNC: 41 MG/DL (ref 9.8–20.1)
CALCIUM SERPL-MCNC: 8.2 MG/DL (ref 8.4–10.2)
CHLORIDE SERPL-SCNC: 103 MMOL/L (ref 98–107)
CO2 SERPL-SCNC: 30 MMOL/L (ref 22–29)
CREAT SERPL-MCNC: 0.7 MG/DL (ref 0.55–1.02)
EOSINOPHIL # BLD AUTO: 0 X10(3)/MCL (ref 0–0.9)
EOSINOPHIL NFR BLD AUTO: 0 %
ERYTHROCYTE [DISTWIDTH] IN BLOOD BY AUTOMATED COUNT: 20.7 % (ref 11.5–14.5)
FINAL CULTURE: NORMAL
FINAL CULTURE: NORMAL
GLOBULIN SER-MCNC: 2.8 GM/DL (ref 2.4–3.5)
GLUCOSE SERPL-MCNC: 173 MG/DL (ref 74–100)
HCO3 UR-SCNC: 32.2 MMOL/L (ref 22–26)
HCT VFR BLD AUTO: 38.7 % (ref 35–46)
HGB BLD-MCNC: 11.5 GM/DL (ref 12–16)
IMM GRANULOCYTES # BLD AUTO: 0.17 10*3/UL
IMM GRANULOCYTES NFR BLD AUTO: 1 %
LYMPHOCYTES # BLD AUTO: 0.2 X10(3)/MCL (ref 0.6–4.6)
LYMPHOCYTES NFR BLD AUTO: 1 %
MAGNESIUM SERPL-MCNC: 2.37 MG/DL (ref 1.6–2.6)
MCH RBC QN AUTO: 28.5 PG (ref 26–34)
MCHC RBC AUTO-ENTMCNC: 29.7 GM/DL (ref 31–37)
MCV RBC AUTO: 96 FL (ref 80–100)
MONOCYTES # BLD AUTO: 0.6 X10(3)/MCL (ref 0.1–1.3)
MONOCYTES NFR BLD AUTO: 4 %
NEUTROPHILS # BLD AUTO: 12.05 X10(3)/MCL (ref 2.1–9.2)
NEUTROPHILS NFR BLD AUTO: 93 %
O2 HGB ARTERIAL: 89.8 % (ref 94–100)
PCO2 BLDA: 57 MMHG (ref 35–45)
PH SMN: 7.36 [PH] (ref 7.35–7.45)
PLATELET # BLD AUTO: 105 X10(3)/MCL (ref 130–400)
PMV BLD AUTO: 11.5 FL (ref 7.4–10.4)
PO2 BLDA: 61 MMHG (ref 80–100)
POC ALLENS TEST: POSITIVE
POC BE: 5.4 (ref -2–2)
POC CO HGB: 3.5 %
POC CO2: 33.9 MMOL/L (ref 22–26)
POC MET HGB: 1 % (ref 0.4–1.5)
POC SAMPLESOURCE: ABNORMAL
POC SATURATED O2: 94 %
POC SITE: ABNORMAL
POC THB: 11.2 GM/DL (ref 12–16)
POC TREATMENT: ABNORMAL
POTASSIUM SERPL-SCNC: 5.1 MMOL/L (ref 3.5–5.1)
PROT SERPL-MCNC: 5 GM/DL (ref 6.4–8.3)
RBC # BLD AUTO: 4.03 X10(6)/MCL (ref 4–5.2)
SETTING 1 ABG: ABNORMAL
SETTING 2 ABG: ABNORMAL
SODIUM SERPL-SCNC: 141 MMOL/L (ref 136–145)
TRIGL SERPL-MCNC: 248 MG/DL (ref 37–140)
WBC # SPEC AUTO: 13 X10(3)/MCL (ref 4.5–11)

## 2022-09-13 NOTE — HISTORICAL [facility] CERNER
This is a historical note converted from Tayla. Formatting and pictures may have been removed.  Please reference Tayla for original formatting and attached multimedia. DATE OF CONSULTATION:?12/29/2020  ?  CONSULT REQUESTED BY:?Dr. Hunt  ?  REASON FOR CONSULTATION: ?? Acute kidney injury, hyperkalemia  ?  HPI: This is a 60-year-old  female with past medical history of coronary artery disease, GERD, hypertension, schizophrenia, mental retardation, neutropenia, thrombocytopenia, chronic discitis, diabetes mellitus type 2, seizure disorder, and obesity.? Patient presented to the emergency department with complaints of shortness of breath and cough.? SARS-CoV-2 PCR was positive.? Patient was admitted on 12/12/2020 for treatment of COVID-19 pneumonitis.? She was upgraded to the intensive care unit and intubated on 12/21/2020 due to worsening hypoxia.? Over the past several days, patients condition deteriorated, she became hemodynamically unstable requiring initiation of vasopressors.? She additionally developed acute kidney injury and hyperkalemia.? Nephrology was consulted for assistance with management of the later.  ?  ALLERGIES: ?Codeine, Lortab, penicillin, seafood, Seroquel, trazodone.  ?  REVIEW OF SYSTEMS:?Unable to obtain secondary to patients condition.  ?  PAST MEDICAL HISTORY:?As stated in history of present illness.  ?  PROCEDURE HISTORY:?Appendectomy, hysterectomy,?right for surgery.  ?   FAMILY HISTORY:?Significant for diabetes,?heart disease, and cancer.  ?  SOCIAL HISTORY:?No history of alcohol, tobacco, or illicit drug use.  ?  MEDICATIONS:?  ascorbic acid 500 mg Tab UD ?500 mg 1 tab(s), Oral, Daily  aspirin 81 mg Chew tab ?81 mg 1 tab(s), Oral, Daily  cefTRIAXone ?1 gm 1 EA, IV Piggyback, q24hr  cetirizine 10 mg Tab ?5 mg 0.5 tab(s), Oral, Daily  DEXAmethasone 4 mg/ml Inj-1ml ?6 mg 1.5 mL, IV Push, q12hr  dextrose 50% abboj ?25 gm 50 mL, IV Push, As Directed  duloxetine HCL 30mg Cap ?30 mg 1  cap(s), Oral, Daily  enoxaparin 100mg/ml Inj ?100 mg 1 mL, Subcutaneous, BID  fluticasone HFA free 44 mcg/inh Aero ?2 puff(s), INH, BID  insulin glargine 100 units/mL -10 mL VIAL ?50 units 0.5 mL, Subcutaneous, Once a day (at bedtime)  insulin glargine 100 units/mL -10 mL VIAL ?45 units 0.45 mL, Subcutaneous, Daily  pantoprazole 40 mg EC Tab ?40 mg 1 tab(s), Oral, BID  sodium zirconium cyclosilicate 10 g Powd ?10 gm 1 packet(s), Oral, TID  voriconazole 200 mg Tab ?200 mg 1 tab(s), Oral, Daily  Continuous: (9)  dexmedetomidine 400 mcg ?400 mcg 100 mL, IV  fentaNYL 500 mcg + Sodium Chloride 0.9% intravenous solution 40 mL ?40 mL, IV  norepinephrine 8 mg + D5W 250 mL ?250 mL, IV  octreotide 500 mcg + sodium chloride 0.9% 500 mL ?500 mL, IV, 100 mL/hr  propofol premix infusion 1,000 mg ?1,000 mg 100 mL, IV  sodium chloride 0.9% 1,000 mL ?1,000 mL, IV, 100 mL/hr  sodium chloride 0.9% 1,000 mL + furosemide 60 mg ?1,000 mL, IV, 125 mL/hr  Sodium Chloride 0.9% intravenous solution 180 mL + cisatracurium 200 mg ?180 mL, IV  vasopressin 100 units + Sodium Chloride 0.9% intravenous solution 100 mL ?100 mL, IV  PRN: (17)  acetaminophen 325 mg Tab ?650 mg 2 tab(s), Oral, q6hr  acetaminophen 500 mg Tab ?1,000 mg 2 tab(s), Oral, q6hr  albuterol CFC free 90 mcg/inh Aero ?180 mcg 2 puff(s), INH, q6hr  dextrose 50% abboj ?12.5 gm 25 mL, IV Push, As Directed  dextrose 50% abboj ?12.5 gm 25 mL, IV Push, Once  dextrose 50% abboj ?12.5 gm 25 mL, IV Push, As Directed  glucagon recombinant 1 mg Inj ?1 mg 1 EA, IM, q10min  glucagon recombinant 1 mg Inj ?1 mg 1 EA, IM, q10min  glucose 40% oral gel ?15 gm 0.5 tube(s), Oral, As Directed  guaiFENesin 100 mg/5 mL Liqu UD 5 mL ?200 mg 10 mL, Oral, q4hr  hydrALAzine 20 mg/ml Inj- 1 mL ?10 mg 0.5 mL, IV, q2hr  hydrOXYzine pamoate 25 mg Cap UD ?25 mg 1 cap(s), Oral, q8hr  insulin (Humalog) lispro 100 u/ml Inj ?3-21 units, Subcutaneous, As Directed  labetalol 5 mg/mL - 4ml syringe ?10 mg 2 mL, IV  Push, q2hr  LB[facility] melatonin 3 mg Tab ?9 mg 3 tab(s), Oral, At Bedtime  sodium chloride 0.9% Inj-10ml ?10 mL, IV Push, As Directed  sodium chloride 0.9% Inj-10ml ?20 mL, IV Push, As Directed  ?   LABORATORY DATA:??  Chemistry? Hematology/Coagulation? Blood Gases?   Sodium Lvl:?134 mmol/L?Low (12/29/20 04:40:00) WBC:?25.6 x10(3)/mcL?High (12/28/20 18:21:00) Sample ABG: arterial (12/29/20 05:20:00)   Potassium Lvl:?6.7 mmol/L?Critical (12/29/20 04:40:00) RBC:?2.12 x10(6)/mcL?Low (12/28/20 18:21:00) Treatment: vent (12/29/20 05:20:00)   Chloride:?95 mmol/L?Low (12/29/20 04:40:00) Hgb:?5.8 gm/dL?Critical (12/28/20 18:21:00) Site: Radial Lt (12/29/20 05:20:00)   CO2: 25 mmol/L (12/29/20 04:40:00) Hct:?19.4 %?Critical (12/28/20 18:21:00) pH Art: 7.35 (12/29/20 05:20:00)   Calcium Lvl:?7.5 mg/dL?Low (12/29/20 04:40:00) Platelet: 180 x10(3)/mcL (12/28/20 18:21:00) pCO2 Art:?58 mmHg?Critical (12/29/20 05:20:00)   Glucose Lvl:?278 mg/dL?High (12/29/20 04:40:00) MCV: 91.5 fL (12/28/20 18:21:00) pO2 Art:?134 mmHg?High (12/29/20 05:20:00)   BUN:?79 mg/dL?High (12/29/20 04:40:00) MCH: 27.4 pg (12/28/20 18:21:00) HCO3 Art:?32 mmol/L?High (12/29/20 05:20:00)   Creatinine:?1.8 mg/dL?High (12/29/20 04:40:00) MCHC:?29.9 gm/dL?Low (12/28/20 18:21:00) CO2 Totl Art:?33.8 mmol/L?High (12/29/20 05:20:00)   Est Creat Clearance Ser: 24.94 mL/min (12/29/20 05:42:25) RDW:?14.8 %?High (12/28/20 18:21:00) O2 Sat Art: 99.8 % (12/29/20 05:20:00)   BUN/Creat Ratio: 44 (12/28/20 22:06:00) MPV:?11.7 fL?High (12/28/20 18:21:00) D base:?5.5?High (12/29/20 05:20:00)   eGFR-AA:?37?Low (12/29/20 04:40:00) Abs Neut:?18.34 x10(3)/mcL?High (12/28/20 18:21:00) THB ABG:?8.6 gm/dL?Low (12/29/20 05:20:00)   eGFR-JAMILA:?31 mL/min/1.73 m2?Low (12/29/20 04:40:00) Segs Man: 73 % (12/28/20 18:21:00) CO Hgb: 2.5 % (12/29/20 05:20:00)   Bili Total:?2.4 mg/dL?High (12/29/20 04:40:00) Band Man: 10 % (12/28/20 18:21:00) Met Hgb Art: 1.1 % (12/29/20 05:20:00)   Bili  Direct:?1.9 mg/dL?High (12/29/20 04:40:00) Lymph Man:?4 %?Low (12/28/20 18:21:00) O2 Hgb: 96.2 % (12/29/20 05:20:00)   Bili Indirect: 0.5 mg/dL (12/29/20 04:40:00) Monocyte Man: 7 % (12/28/20 18:21:00) Allens: Positive (12/29/20 05:20:00)   AST:?12447 unit/L?High (12/29/20 04:40:00) Eos Man: 0 % (12/28/20 18:21:00) Setting 1 ABG: vt 500, ac 35 (12/29/20 05:20:00)   ALT:?4321 unit/L?High (12/29/20 04:40:00) Basophil Man: 0 % (12/28/20 18:21:00) Setting 2 ABG: peep+14 (12/29/20 05:20:00)   Alk Phos:?167 unit/L?High (12/29/20 04:40:00) [place] Man:?3 %?High (12/28/20 18:21:00) Setting 3 ABG: fio2 80% (12/29/20 05:20:00)   Total Protein:?4.8 gm/dL?Low (12/29/20 04:40:00) Myelo Man: 3 % (12/28/20 18:21:00) ?   Albumin Lvl:?2.3 gm/dL?Low (12/29/20 04:40:00) NRBC Man: 9 % (12/28/20 18:21:00) ?   Globulin: 2.5 gm/dL (12/29/20 04:40:00) Hypochrom: 2+ (12/28/20 18:21:00) ?   A/G Ratio:?0.9 ratio?Low (12/29/20 04:40:00) Platelet Est: Normal (12/28/20 18:21:00) ?   Trig:?322 mg/dL?High (12/28/20 15:13:00) Anisocyte: 1+ (12/28/20 18:21:00) ?   ? Macrocyte: 1+ (12/28/20 18:21:00) ?   ? Polychrom: 1+ (12/28/20 18:21:00) ?   ? RBC Morph: Abnormal (12/28/20 18:21:00) ?   ?  PHYSICAL EXAMINATION:  37.8, 126/51, 80,?35, 100%.  General appearance: Patient is intubated, sedated, paralyzed.  HEENT: NC/AT. PERRLA, EOMI, no scleral icterus. ?Neck is supple. ?Mild periorbital edema noted.  Chest:?Lung sounds are diminished to auscultation bilaterally, patient is intubated and mechanically ventilated.  Heart: S1-S2.  Abdomen: Obese, soft,?bowel sounds hypoactive.??OG tube is in place, clamped.  Genitourinary: Sarkar catheter to gravity draining yellow/clear urine  Extremities: Bilateral upper extremity edema, greater to right arm. ?Trace lower extremity?edema, no cyanosis or clubbing, pulses equal and palpable throughout.?  Neurological: Paralyzed and sedated.  ?   IMPRESSION:  COVID-19 infection  Acute hypoxic hypercapnic respiratory  failure  Acute kidney injury, nonoliguric  Hyperkalemia  Transaminitis likely due to to ischemic hepatitis  Anemia secondary to gastrointestinal bleeding  Persistent leukocytosis  History of seizure disorder  History of diabetes  History of schizophrenia  ?  RECOMMENDATIONS:  Acute kidney injury is a result of prerenal azotemia?and possibly?ATN?due to hypoperfusion.? At the time of my exam, patient?was?requiring 26 mcg/min of norepinephrine. ?Agree with discontinuation of?TPN?with electrolytes. ?Will start?normal saline?at 125 mL/h, add?60 mg?of furosemide to the first back to?encourage renal potassium wasting.? Hyperkalemia was treated this morning with?intravenous?insulin, D50,?calcium gluconate,?and sodium zirconium cyclosilicate. ?Will repeat BMP midday?to ensure improvement of hyperkalemia.  Will discontinue atorvastatin, bumetanide, buspirone,?ferrous sulfate, mirtazapine, oxybutynin, baclofen, gabapentin, and?fenofibrate for now. ?Patient has severe anemia,?likely secondary to gastrointestinal bleeding. ?She received 1 unit of packed red blood cells?overnight, repeat?CBC is pending.? Consider discontinuing or decreasing?Lovenox, transfuse?if repeat hemoglobin is less than 8.0 g/dL.  We will continue to follow very closely along with primary service.  ?  Thank you very much for your consultation.?   I saw and evaluated the patient. Pertinent physical exam findings, laboratory and diagnostic results, current treatment plan reviewed and discussed. I agree with findings and plan of care as documented in the Nurse Practitioners note.  ?

## 2022-09-13 NOTE — HISTORICAL [facility] CERNER
This is a historical note converted from Tayla. Formatting and pictures may have been removed.  Please reference Tayla for original formatting and attached multimedia. Chief Complaint  To ER per EMS from [facility]. ?States SOB and cough last 2 days. ?Rapid done at [facility].  Reason for Consultation  Acute GI bleed  History of Present Illness  Patient is a 60-year-old female with past medical history for chronic neutropenia and thrombocytopenia 2/2 to cirhosis, chronic discitis, hypertension, type II diabetes, and seizures who initially presented at [facility] urgent care for shortness of breath and cough on 12/12 and was transferred to [facility] given hypoxia. ?Patient was found to be COVID positive and ultimately required intubation on 12/21. ?Patient has had a complicated hospital stay to include staph epidermidis bacteremia with sepsis secondary to  source, suspected ischemic hepatitis, acute kidney injury, and GI bleed on 12/28 with stool occult blood positive. ?Patient received a total of 4 units of red blood cells with H&H remaining stable and has received platelets given thrombocytopenia. ?Patient was also discontinued off of anticoagulation. ?She was also noted to have acute transaminitis with AST of approximately 22,000, and ALT of approximately 8000 suspected secondary to ischemia and has been down trending since peaking on 12/29. ?Patient remains intubated and sedated. ?Patient no longer on vasopressor support. ?And patient was also recently started on oral vancomycin as well as Flagyl for concern of possible C. diff. ?Infectious diseases finding. ?GI was consulted for evaluation.  ?  Review of Systems  Unable to obtain due to patients clinical condition  Physical Exam  Vitals & Measurements  T:?36.6? ?C (Oral)? TMIN:?36.6? ?C (Oral)? TMAX:?37.2? ?C (Oral)? HR:?78(Peripheral)? HR:?79(Monitored)? RR:?0(Spontaneous)? RR:?35(Total)? BP:?154/99? SpO2:?91%?  General: Intubated and sedated.  HEENT:  Normocephalic, atraumatic  Neck: Supple, No lymphadenopathy.  Respiratory: Breath sounds bilaterally, mechanical breath sounds also noted.  Cardiovascular: Normal rate, Regular rhythm, No murmur, No gallop.  Gastrointestinal: Soft, obese, Non-distended, Normal bowel sounds, No organomegaly.  Musculoskeletal: Diffuse edema; anasarca.  Integumentary: Ecchymosis noted on?the antecubital fossa.  Neurologic: Sedated limiting exam.  Assessment/Plan  Anemia secondary to suspected acute GI bleed: Stable H&H this morning  COVID-19 pneumonitis  Staph epi bacteremia  Enterococcal UTI  Suspected C. diff colitis  Hepatitis suspected secondary to ischemia  Chronic neutropenia and thrombocytopenia 2/2 Cirrhosis  History of hypertension  History of seizures  Type 2 diabetes  ?   -Consulted for acute GI bleed; H/H has remained stable?8.6/30 this AM  -Patient continues to have critical thrombocytopenia (24)  -Improving?LFTs; continue to monitor  -Anticoagulation discontinued per primary team  -Would recheck INR  -Continue to?monitor closely, but no intervention needed at this time  -GI bleed likely 2/2 to ischemic event 2/2 to sepsis?with no further episodes of GI bleeding; continue to monitor  -Staff addendum to follow with changes or additions  ?   Problem List/Past Medical History  Ongoing  2019-nCoV  Acute low back pain  ADL  ADL - activity of daily living  Allergic rhinitis, seasonal  CAD (coronary artery disease)  Chronic constipation  DM2 (diabetes mellitus, type 2)  GERD [Gastroesophageal reflux disease]  Headache  Hypertension  JAMA (iron deficiency anemia)  Interstitial pulmonary disease, unspecified  Leukopenia  Lumbar discitis  Mixed hyperlipidemia  Morning headache, controlled  Obesity, Class III, BMI 40-49.9 (morbid obesity)  Schizophrenia  Seizures  Thrombocytopenia  Ureterolithiasis  Historical  back injury from fall  Blood disorder  major depression  Mild mental retardation  Pneumonia  Procedure/Surgical  History  Repair Face Skin, External Approach (10/11/2019)  Simple repair of superficial wounds of face, ears, eyelids, nose, lips and/or mucous membranes; 2.6 cm to 5.0 cm (10/11/2019)  Repair Left Lower Leg Subcutaneous Tissue and Fascia, Open Approach (02/21/2018)  Simple repair of superficial wounds of scalp, neck, axillae, external genitalia, trunk and/or extremities (including hands and feet); 2.5 cm or less (02/21/2018)  Biopsy Gastrointestinal (07/19/2017)  Esophagogastroduodenoscopy (07/19/2017)  Esophagogastroduodenoscopy, flexible, transoral; with biopsy, single or multiple (07/19/2017)  Excision of Stomach, Pylorus, Via Natural or Artificial Opening Endoscopic, Diagnostic (07/19/2017)  Colonoscopy (07/05/2017)  COLORECTAL CANCER SCREENING; COLONOSCOPY ON INDIVIDUAL NOT MEETING CRITERIA FOR HIGH RISK (07/05/2017)  Inspection of Lower Intestinal Tract, Via Natural or Artificial Opening Endoscopic (07/05/2017)  Repair Left Hand Skin, External Approach (10/05/2016)  Simple repair of superficial wounds of scalp, neck, axillae, external genitalia, trunk and/or extremities (including hands and feet); 2.5 cm or less (10/05/2016)  Repair Left Hand Skin, External Approach (02/28/2016)  Simple repair of superficial wounds of scalp, neck, axillae, external genitalia, trunk and/or extremities (including hands and feet); 2.5 cm or less (02/28/2016)  Biopsy Bone Marrow Aspiration (.) (02/23/2016)  BONE MARROW ASPIRATION PERFORMED WITH BONE MARROW BIOPSY THROUGH THE SAME INCISION ON THE SAME DATE OF SERVICE (02/23/2016)  Bone marrow; biopsy, needle or trocar (02/23/2016)  Drainage of Bone Marrow, Percutaneous Approach, Diagnostic (02/23/2016)  Extraction of Iliac Bone Marrow, Percutaneous Approach, Diagnostic (02/23/2016)  Drainage of Abdomen Skin, External Approach (02/11/2016)  Drained boil on abdomen (02/11/2016)  Incision and drainage of abscess (eg, carbuncle, suppurative hidradenitis, cutaneous or subcutaneous  abscess, cyst, furuncle, or paronychia); complicated or multiple (02/11/2016)  Magnetic resonance (eg, proton) imaging, spinal canal and contents, without contrast material, followed by contrast material(s) and further sequences; lumbar (01/08/2015)  Radiologic examination, spine, lumbosacral; two or three views (01/08/2015)  Magnetic resonance (eg, proton) imaging, spinal canal and contents, without contrast material, followed by contrast material(s) and further sequences; lumbar (01/07/2015)  Radiologic examination, spine, lumbosacral; two or three views (01/07/2015)  Arteriography of cerebral arteries (09/12/2014)  Excision of intervertebral disc (02/21/2014)  Laminectomy Lumbar MIS (02/21/2014)  Other exploration and decompression of spinal canal (02/21/2014)  Central Venous Catheter Placement with Guidance (09/01/2013)  Insertion of indwelling urinary catheter (07/31/2013)  H/O: Hysterectomy  appendectomy  Appendectomy  compressed disc  hysterectomy  right foot surgery due to fracture   Medications  Inpatient  acetaminophen, 650 mg= 2 tab(s), Oral, q6hr, PRN  acetaminophen, 1000 mg= 2 tab(s), Oral, q6hr, PRN  cetirizine 10 mg oral tablet, 5 mg= 0.5 tab(s), Oral, Daily  D5W 500 mL, 500 mL, IV  dexamethasone (for IV Push), 6 mg= 1.5 mL, IV Push, q12hr  Dextrose 50% and Water (50 mL vial/syringe), 12.5 gm= 25 mL, IV Push, Once, PRN  Dextrose 50% and Water (50 mL vial/syringe), 12.5 gm= 25 mL, IV Push, As Directed, PRN  Dextrose 50% and Water (50 mL vial/syringe), 25 gm= 50 mL, IV Push, As Directed, PRN  Dextrose 50% in Water intravenous solution, 12.5 gm= 25 mL, IV Push, As Directed, PRN  fentanyl additive 500 mcg + Sodium Chloride 0.9% intravenous solution 40 mL  Flagyl 500 mg / 100 ml premix, 500 mg= 100 mL, IV Piggyback, [id number]  Flovent HFA 44 mcg/inh inhalation aerosol, 2 puff(s), INH, BID  glucagon recombinant 1 mg injection, 1 mg= 1 EA, IM, q10min, PRN  glucagon recombinant 1 mg injection, 1 mg= 1 EA,  IM, q10min, PRN  glucose 40% oral gel, 15 gm= 0.5 tube(s), Oral, As Directed, PRN  guaifenesin 100 mg/5 mL oral liquid, 200 mg= 10 mL, Oral, q4hr, PRN  hydrALAZINE 20 mg/mL injectable solution, 10 mg= 0.5 mL, IV, q2hr, PRN  insulin glargine, 55 units= 0.55 mL, Subcutaneous, Daily  insulin glargine 100 units/mL subcutaneous inj., 50 units= 0.5 mL, Subcutaneous, Once a day (at bedtime)  insulin lispro 100 units/mL subcutaneous injection, 4-28 units, Subcutaneous, As Directed, PRN  labetalol 5 mg/mL intravenous solution, 10 mg= 2 mL, IV Push, q2hr, PRN  levETIRAcetam  Marcaine HCl 0.25% injectable solution, 20 mL, IM, Once-NOW  Melatonin 3 mg oral tablet, 9 mg= 3 tab(s), Oral, At Bedtime, PRN  meropenem  NORepinephrine additive 8 mg + Dextrose 5% in Water intravenous solution 250 mL  octreotide 500 mcg + Sodium Chloride 0.9% 500ml 500 mL  Precedex 400 mcg/100 mL NS Infusion 400 mcg, 400 mcg= 100 mL, IV  Propofol 1000 mg/ 100 mL IV Infusion 1,000 mg, 1000 mg= 100 mL, IV  Protonix, 40 mg= 1 EA, IV Slow, BID  Sodium Chloride 0.9% 100ml 180 mL + cisatracurium additive 200 mg  Sodium Chloride 0.9% PF vial (For PICC Flush), 10 mL, IV Push, As Directed, PRN  Sodium Chloride 0.9% PF vial (For PICC Flush), 20 mL, IV Push, As Directed, PRN  Vancomycin (for IVPB), 1000 mg= 200 mL, IV Piggyback, q24hr  vancomycin 125 mg oral capsule, 125 mg= 2.5 mL, Oral, q6hr  vasopressin 100 units + Sodium Chloride 0.9% intravenous solution 100 mL  Ventolin HFA 90 mcg/inh inhalation aerosol, 180 mcg= 2 puff(s), INH, q6hr, PRN  Vistaril, 25 mg= 1 cap(s), Oral, q8hr, PRN  Vitamin D, 2000 IntUnit= 0.25 mL, NG, Daily  voriconazole 200 mg oral tablet, 200 mg= 1 tab(s), Oral, Daily  Home  Aimovig SureClick Autoinjector 140 mg/mL subcutaneous solution, 140 mg, Subcutaneous, qMonth, 12 refills  Ambien 10 mg oral tablet, 10 mg= 1 tab(s), Oral, qPM  aspirin 81 mg oral tablet, chewable, 81 mg= 1 tab(s), Oral, Daily, 5 refills  Astelin 137 mcg/inh nasal  spray, 1 spray(s), Nasal, BID, 5 refills  baclofen 10 mg oral tablet, 10 mg= 1 tab(s), Oral, TID,? ?Still taking, not as prescribed: Takes BID  Basaglar KwikPen 100 units/mL subcutaneous solution, 35 units, Subcutaneous, Once a day (at bedtime), 11 refills  BD pen needles ultra fine 31 guage 5/16 inch, See Instructions, 11 refills  Breo Ellipta 100 mcg-25 mcg/inh inhalation powder, 1 puff(s), INH, Daily  busPIRone 10 mg oral tablet, 10 mg= 1 tab(s), Oral, BID  celecoxib 200 mg oral capsule, See Instructions  Coreg 6.25 mg oral tablet, 6.25 mg= 1 tab(s), Oral, BID, 5 refills  diabetic supplies- alcohol pads, See Instructions, 11 refills  Diabetic supplies- lancets, See Instructions, 11 refills  diabetic supplies- test strips, See Instructions, 11 refills  DULoxetine 30 mg oral delayed release capsule, 30 mg= 1 cap(s), Oral, Daily  fenofibrate 134 mg oral capsule, 134 mg= 1 cap(s), Oral, Daily, 5 refills  ferrous gluconate 324 mg (38 mg elemental iron) oral tablet, 324 mg= 1 tab(s), Oral, Daily, 5 refills  fludrocortisone 0.1 mg oral tablet, 0.1 mg= 1 tab(s), Oral, Daily  Glucometer, See Instructions  Invega 6 mg oral tablet, extended release, 6 mg= 1 tab(s), Oral, qAM, 1 refills  Janumet  mg-1000 mg oral tablet, extended release, 1 tab(s), Oral, Daily  Jardiance 25 mg oral tablet, 25 mg= 1 tab(s), Oral, Daily  levetiracetam 750 mg oral tablet, 750 mg= 1 tab(s), Oral, BID, 12 refills  levocetirizine 5 mg oral tablet, 5 mg= 1 tab(s), Oral, qPM, 1 refills  Linzess 290 mcg oral capsule, 290 mcg= 1 cap(s), Oral, Daily, 5 refills  losartan 50 mg oral tablet, 50 mg= 1 tab(s), Oral, Daily, 5 refills  mirtazapine 45 mg oral tablet, 45 mg= 1 tab(s), Oral, qPM  Multiple Vitamins with Minerals oral tablet, chewable, 1 tab(s), Oral, qPM, 5 refills  omeprazole 20 mg oral DR capsule, 40 mg= 2 cap(s), Oral, Daily, 5 refills  oxybutynin 5 mg oral tablet, 5 mg= 1 tab(s), Oral, BID,? ?Not taking  pravastatin 80 mg oral tablet, 80  mg= 1 tab(s), Oral, Daily  pregabalin 150 mg oral capsule, 150 mg= 1 cap(s), Oral, BID  tamsulosin 0.4 mg oral capsule, 0.4 mg= 1 cap(s), Oral, Daily, 5 refills  Trulicity Pen 1.5 mg/0.5 mL subcutaneous solution, See Instructions  Ventolin HFA 90 mcg/inh inhalation aerosol, 180 mcg= 2 puff(s), INH, q6hr, 5 refills  voriconazole 200 mg oral tablet, 200 mg= 1 tab(s), Oral, Daily  Allergies  Lortab  SEROquel  Seafood  codeine?(HA - Hyperactivity, C/O: itching)  penicillins?(Itching)  traZODone  trazodone-like drugs?(.)  Social History  Abuse/Neglect  No, No, Yes, 12/24/2020  No, 12/12/2020  No, 12/11/2020  Alcohol - Denies Alcohol Use, 07/31/2013  Never, 08/04/2020  Employment/School - No Risk, 08/31/2013  Disabled, 08/04/2020  Exercise  Exercise duration: 60. Exercise frequency: 1-2 times/week. Exercise type: water exercise., 09/30/2019  Financial/Legal Situation  None, 09/22/2020  Home/Environment - No Risk, 08/31/2013  Lives with Alone. Living situation: Home with assistance. Home equipment: Wheelchair., 08/18/2016    Never in , 08/07/2020  Nutrition/Health - No Risk, 08/31/2013  Calorie restricted, Caffeine intake amount: 1 cup of coffee daily., 08/18/2016  Sexual  Gender Identity Identifies as female., 09/30/2019  Spiritual/Cultural  Buddhist, Yes, 09/30/2019  Substance Use - Denies Substance Abuse, 07/31/2013  Never, 08/04/2020  Tobacco - Denies Tobacco Use, 07/31/2013  Never (less than 100 in lifetime), N/A, 12/24/2020  Never (less than 100 in lifetime), N/A, 12/12/2020  Never (less than 100 in lifetime), N/A, 12/12/2020  Never (less than 100 in lifetime), No, 12/11/2020  Family History  Acute myocardial infarction.: Sister.  CAD (coronary artery disease)....: Mother and Father.  Diabetes mellitus type 2: Brother.  Esophageal cancer....: Mother.  Immunizations  Vaccine Date Status   zoster vaccine, inactivated 10/20/2020 Given   influenza virus vaccine, inactivated 10/20/2020 Given   pneumococcal  23-polyvalent vaccine 09/30/2019 Given   influenza virus vaccine, inactivated 09/30/2019 Given   tetanus/diphtheria/pertussis, acel(Tdap) 02/28/2016 Given       Patient seen and examined in conjunction with the resident during rounds on 01/04. ?I actively participated in all aspects of [facility] patient encounter along with the resident, and agree with the assessment and plan as outlined above. ?  ?   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 ?She has had hematologic evaluation for this?with conclusions of?thrombocytopenia from cirrhosis?and hypersplenism.? She is also had work-up for iron deficiency anemia in the past with aColonoscopy in July 2017 by Dr. Ponce that was normal except for diverticula in the cecum. ?She also had an EGD in July 2017 that showed mild chronic gastritis but no varices.? She has responded to Injectafer infusions in the past for iron deficiency anemia.  ?   Admitted?with COVID-19?and ultimate decompensation required endotracheal intubation.? She remains on mechanical ventilation?currently.  ?   Around December 26, December 27 she started having?evidence of shock with hypotension requiring vasopressors.? Blood cultures at that time grew out staph epidermis. ?She subsequently continued to decompensate with evidence of hematuria?and?then subsequently?large-volume hematochezia. ?Her hemoglobin went from 8-5.8 in 2?days?along with?evidence of significant leukocytosis,?acute ischemic liver injury with significant transaminitis greater than 20,000, MIRIAM secondary to ATN, and DIC with elevated INR, thrombocytopenia.? She was appropriately resuscitated with packed red blood cells?along with hemodynamic support. ?She in addition required platelet transfusion as well.? Since that time she is evidence no further?GI bleeding.? She received a total of 4 units of packed red blood  cells.? Transaminases have been downtrending. ?Her last INR remained elevated at 2.2 five?days ago.  ?   Impression:?  1. Acute ischemic colitis?secondary to?septic shock from Staphylococcus?bacteremia?evidenced?by significant hematochezia requiring?packed red blood cell transfusion.? Appears to be resolving as no further episodes of?hematochezia since that time and hemoglobin has stabilized.? Also currently remains?off of vasopressors with improvement of?hemodynamics.? Agree with holding full dose anticoagulation but likely can be restarted on prophylactic?therapy.  2. 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 Transaminases are downtrending; however given her prior liver disease history it is unclear?how well her liver will recover. ?I would recommend rechecking INR?and following periodically?to monitor for improvement.? Thrombocytopenia in part is related to cirrhosis although?recent decompensation?suggests?a mild DIC picture as well.??Agree?with platelet transfusion?if less than 20.  ?  ?  ?

## 2022-09-13 NOTE — HISTORICAL [facility] CERNER
This is a historical note converted from Tayla. Formatting and pictures may have been removed.  Please reference Tayla for original formatting and attached multimedia. [place]:  Transition of Care Assessment Note  ?  Diagnosis:?COVID-19 pneumonitis/Respiratory Failure  ?  Patient Goals: Continue with current medications.?  ?  Code status: DNR  ?  I have received checkout from ?Elías Valenzuela?regarding this patient. Please call NIMESH Garcia- TAHIRA at [phone] from 4PM today to 7AM on 12/29/20 if any issues arise.?  ?  Physician on call: Dr.?Chris Hunt  ?  Melania Weaver 1?  [facility]   Agree with the above  Please reach at above number  ?  Mark Hunt  U PGY-2

## 2022-09-13 NOTE — HISTORICAL [facility] CERNER
This is a historical note converted from Tayla. Formatting and pictures may have been removed.  Please reference Tayla for original formatting and attached multimedia. Chief Complaint  To ER per EMS from [facility]. ?States SOB and cough last 2 days. ?Rapid done at [facility].  Reason for Consultation  COVID19  History of Present Illness  ?  61 yo female with complex medical history to include chronic neutropenia and thrombocytopenia, ILD, chronic discitis (fungal? sees Dr. SCHMID, on chronic suppression with voriconazole), HTN, DM, and was recently diagnosed with COVID 19 and presented to our ED via EMS with hypoxemia.? +dry cough, + [place], + sob, + headache, + fatigue, + chronic back pain, no worse than baseline.? Denies cp, denies disturbances in taste or smell.? Unsure how she might have contracted covid19.? ID consulted to evaluate for covid19.?  ?   Lab review:  Ferrritin normal at 199  Crp:? 4  ESR:? 33  ?  Currently on vapotherm/high flow oxygen  ?  Review of Systems  Full 14 point ROS performed, all negative except as mentioned in HPI  Physical Exam  Vitals & Measurements  T:?36.9? ?C (Oral)? TMIN:?36.5? ?C (Oral)? TMAX:?37.0? ?C (Oral)? HR:?78(Monitored)? RR:?20? BP:?130/81? SpO2:?92%?  ????PE: Gen: AAOx3 in nad, comfortable, on Vapotherm  ????HEENT: no thrush  ????Neck: supple  ????CVS: RRR no m/r/g  ????C/L:?occ exp wheeze posteriorly  ????Abd: soft, protuberant, nt/nd  ????Ext: no c/c/e  ????Skin: no rashes/nodules noted  Assessment/Plan  CAD (coronary artery disease)?I25.10  Chronic constipation?K59.09  Cough?[id number]  Diabetes?E11.9  Hypertension?I10  Interstitial pulmonary disease, unspecified?J84.9  Pneumonia due to COVID-19 virus?U07.1  Shortness of breath?[id number]  ?  Recommendations:  -Continue present therapy with steroids, however would change to Solu-Medrol 125mg IV [id number].? Reviewed EKG from 12.11, QTC normal.? Can d/c antimicrobials  tomorrow if cultures remain negative.? Would also add Flovent BID in an effort to provide directed anti-inflammatory therapy, rinsing mouth after use.? Have also had a long discussion with her about the use of remdesivir (which she is currently on and experiencing no bradycardia) with concomitant baricitinib.? Have reviewed the FDA EUA fact sheet and discussed the potential?benefits especially given the fact she is on high flow oxygenation/Vapotherm.? Will plan to start Baricitinb today at 4mg po daily.? Have?also?discussed the largest known adverse effects which are felt to be VTE, have discussed this with?her and feel she needs VTE prophylaxis with high dose baricitinib,?and have made the primary team aware.? ID has made the other changes as noted.? ?  -Continue supplemental 02 as needed as well as NIPPV / Vapotherm as required  -Autopronation if possible q2 hours  -Continue isolative precautions  -Would repeat inflammatory markers (CRP/ESR), LDH, D-Dimer, and ferritin [id number] x 3 days then q48 hours thereafter.  ?   Plan to extend remdesivir to 10 days or until hospital discharge, whichever is sooner as long as she continues to tolerate well.?  ?   Will continue to follow  ?   MD Kimberly  ID Staff?   Problem List/Past Medical History  Ongoing  2019-nCoV  Acute low back pain  ADL  ADL - activity of daily living  Allergic rhinitis, seasonal  CAD (coronary artery disease)  Chronic constipation  DM2 (diabetes mellitus, type 2)  GERD [Gastroesophageal reflux disease]  Headache  Hypertension  JAMA (iron deficiency anemia)  Interstitial pulmonary disease, unspecified  Leukopenia  Lumbar discitis  Mixed hyperlipidemia  Morning headache, controlled  Obesity, Class III, BMI 40-49.9 (morbid obesity)  Schizophrenia  Seizures  Thrombocytopenia  Ureterolithiasis  Historical  back injury from fall  Blood disorder  major depression  Mild mental retardation  Pneumonia  Procedure/Surgical History  Repair Face Skin, External  Approach (10/11/2019)  Simple repair of superficial wounds of face, ears, eyelids, nose, lips and/or mucous membranes; 2.6 cm to 5.0 cm (10/11/2019)  Repair Left Lower Leg Subcutaneous Tissue and Fascia, Open Approach (02/21/2018)  Simple repair of superficial wounds of scalp, neck, axillae, external genitalia, trunk and/or extremities (including hands and feet); 2.5 cm or less (02/21/2018)  Biopsy Gastrointestinal (07/19/2017)  Esophagogastroduodenoscopy (07/19/2017)  Esophagogastroduodenoscopy, flexible, transoral; with biopsy, single or multiple (07/19/2017)  Excision of Stomach, Pylorus, Via Natural or Artificial Opening Endoscopic, Diagnostic (07/19/2017)  Colonoscopy (07/05/2017)  COLORECTAL CANCER SCREENING; COLONOSCOPY ON INDIVIDUAL NOT MEETING CRITERIA FOR HIGH RISK (07/05/2017)  Inspection of Lower Intestinal Tract, Via Natural or Artificial Opening Endoscopic (07/05/2017)  Repair Left Hand Skin, External Approach (10/05/2016)  Simple repair of superficial wounds of scalp, neck, axillae, external genitalia, trunk and/or extremities (including hands and feet); 2.5 cm or less (10/05/2016)  Repair Left Hand Skin, External Approach (02/28/2016)  Simple repair of superficial wounds of scalp, neck, axillae, external genitalia, trunk and/or extremities (including hands and feet); 2.5 cm or less (02/28/2016)  Biopsy Bone Marrow Aspiration (.) (02/23/2016)  BONE MARROW ASPIRATION PERFORMED WITH BONE MARROW BIOPSY THROUGH THE SAME INCISION ON THE SAME DATE OF SERVICE (02/23/2016)  Bone marrow; biopsy, needle or trocar (02/23/2016)  Drainage of Bone Marrow, Percutaneous Approach, Diagnostic (02/23/2016)  Extraction of Iliac Bone Marrow, Percutaneous Approach, Diagnostic (02/23/2016)  Drainage of Abdomen Skin, External Approach (02/11/2016)  Drained boil on abdomen (02/11/2016)  Incision and drainage of abscess (eg, carbuncle, suppurative hidradenitis, cutaneous or subcutaneous abscess, cyst, furuncle, or paronychia);  complicated or multiple (02/11/2016)  Magnetic resonance (eg, proton) imaging, spinal canal and contents, without contrast material, followed by contrast material(s) and further sequences; lumbar (01/08/2015)  Radiologic examination, spine, lumbosacral; two or three views (01/08/2015)  Magnetic resonance (eg, proton) imaging, spinal canal and contents, without contrast material, followed by contrast material(s) and further sequences; lumbar (01/07/2015)  Radiologic examination, spine, lumbosacral; two or three views (01/07/2015)  Arteriography of cerebral arteries (09/12/2014)  Excision of intervertebral disc (02/21/2014)  Laminectomy Lumbar MIS (02/21/2014)  Other exploration and decompression of spinal canal (02/21/2014)  Central Venous Catheter Placement with Guidance (09/01/2013)  Insertion of indwelling urinary catheter (07/31/2013)  H/O: Hysterectomy  appendectomy  Appendectomy  compressed disc  hysterectomy  right foot surgery due to fracture   Medications  Inpatient  acetaminophen, 650 mg= 2 tab(s), Oral, q6hr, PRN  acetaminophen, 1000 mg= 2 tab(s), Oral, q6hr, PRN  Ambien 10 mg oral tablet, 10 mg= 1 tab(s), Oral, qPM  ascorbic acid 500 mg oral tablet, 500 mg= 1 tab(s), Oral, Daily  aspirin 81 mg oral tablet, CHEWABLE, 81 mg= 1 tab(s), Oral, Daily  atorvastatin 20 mg oral tablet, 20 mg= 1 tab(s), Oral, At Bedtime  baclofen 10 mg oral tablet, 10 mg= 1 tab(s), Oral, TID  busPIRone 10 mg oral tablet, 10 mg= 1 tab(s), Oral, BID  cetirizine 10 mg oral tablet, 5 mg= 0.5 tab(s), Oral, Daily  Coreg, 6.25 mg= 2 tab(s), Oral, BID  Decadron, 4 mg= 1 mL, IV Push, BID  Dextrose 50% and Water (50 mL vial/syringe), 12.5 gm= 25 mL, IV Push, Once, PRN  Dextrose 50% and Water (50 mL vial/syringe), 12.5 gm= 25 mL, IV Push, As Directed, PRN  Dextrose 50% and Water (50 mL vial/syringe), 25 gm= 50 mL, IV Push, As Directed, PRN  Dextrose 50% in Water intravenous solution, 12.5 gm= 25 mL, IV Push, As Directed, PRN  DULoxetine 30 mg  oral delayed release capsule, 30 mg= 1 cap(s), Oral, Daily  fenofibrate 145 mg oral tablet, 145 mg= 1 tab(s), Oral, Daily  ferrous sulfate 325 mg oral tablet, 325 mg= 1 tab(s), Oral, qMWF  gabapentin 300 mg oral capsule, 300 mg= 1 cap(s), Oral, BID  glucagon recombinant 1 mg injection, 1 mg= 1 EA, IM, q10min, PRN  glucagon recombinant 1 mg injection, 1 mg= 1 EA, IM, q10min, PRN  glucose 40% oral gel, 15 gm= 0.5 tube(s), Oral, As Directed, PRN  guaifenesin 100 mg/5 mL oral liquid, 200 mg= 10 mL, Oral, q4hr, PRN  insulin glargine 100 U/mL (per pen), 25 units= 0.25 mL, Subcutaneous, Once a day (at bedtime)  insulin lispro 100 units/mL subcutaneous injection, 2-14 units, Subcutaneous, As Directed, PRN  Invega 3 mg oral tablet, extended release, 6 mg= 2 tab(s), Oral, Daily  levETIRAcetam, 750 mg= 1.5 tab(s), Oral, BID  linaclotide 72 mcg oral capsule, 288 mcg= 4 cap(s), Oral, Daily  losartan 50 mg oral tablet, 50 mg= 1 tab(s), Oral, Daily  Marcaine HCl 0.25% injectable solution, 20 mL, IM, Once-NOW  mirtazapine 45 mg oral tablet, 45 mg= 1.5 tab(s), Oral, qPM  oxybutynin 5 mg oral tablet, 5 mg= 1 tab(s), Oral, BID  Protonix, 40 mg= 1 tab(s), Oral, Daily  Remdesivir (for IVPB)  Rocephin (for IVPB)  tamsulosin 0.4 mg oral capsule, 0.4 mg= 1 cap(s), Oral, Daily  Ventolin HFA 90 mcg/inh inhalation aerosol, 180 mcg= 2 puff(s), INH, q6hr, PRN  Vistaril, 25 mg= 1 cap(s), Oral, q8hr, PRN  voriconazole 200 mg oral tablet, 200 mg= 1 tab(s), Oral, Daily  Xarelto, 10 mg= 1 tab(s), Oral, Daily  zinc sulfate 220 mg oral capsule, 220 mg= 1 cap(s), Oral, Daily  Zithromax 500 mg/D5W 250 mL IVPB  Home  Aimovig SureClick Autoinjector 140 mg/mL subcutaneous solution, 140 mg, Subcutaneous, qMonth, 12 refills  Ambien 10 mg oral tablet, 10 mg= 1 tab(s), Oral, qPM  aspirin 81 mg oral tablet, chewable, 81 mg= 1 tab(s), Oral, Daily, 5 refills  Astelin 137 mcg/inh nasal spray, 1 spray(s), Nasal, BID, 5 refills  baclofen 10 mg oral tablet, 10 mg= 1  tab(s), Oral, TID,? ?Still taking, not as prescribed: Takes BID  Basaglar KwikPen 100 units/mL subcutaneous solution, 35 units, Subcutaneous, Once a day (at bedtime), 11 refills  BD pen needles ultra fine 31 guage 5/16 inch, See Instructions, 11 refills  Breo Ellipta 100 mcg-25 mcg/inh inhalation powder, 1 puff(s), INH, Daily  busPIRone 10 mg oral tablet, 10 mg= 1 tab(s), Oral, BID  celecoxib 200 mg oral capsule, See Instructions  Coreg 6.25 mg oral tablet, 6.25 mg= 1 tab(s), Oral, BID, 5 refills  diabetic supplies- alcohol pads, See Instructions, 11 refills  Diabetic supplies- lancets, See Instructions, 11 refills  diabetic supplies- test strips, See Instructions, 11 refills  DULoxetine 30 mg oral delayed release capsule, 30 mg= 1 cap(s), Oral, Daily  fenofibrate 134 mg oral capsule, 134 mg= 1 cap(s), Oral, Daily, 5 refills  ferrous gluconate 324 mg (38 mg elemental iron) oral tablet, 324 mg= 1 tab(s), Oral, Daily, 5 refills  fludrocortisone 0.1 mg oral tablet, 0.1 mg= 1 tab(s), Oral, Daily  Glucometer, See Instructions  Invega 6 mg oral tablet, extended release, 6 mg= 1 tab(s), Oral, qAM, 1 refills  Janumet  mg-1000 mg oral tablet, extended release, 1 tab(s), Oral, Daily  Jardiance 25 mg oral tablet, 25 mg= 1 tab(s), Oral, Daily  levetiracetam 750 mg oral tablet, 750 mg= 1 tab(s), Oral, BID, 12 refills  levocetirizine 5 mg oral tablet, 5 mg= 1 tab(s), Oral, qPM, 1 refills  Linzess 290 mcg oral capsule, 290 mcg= 1 cap(s), Oral, Daily, 5 refills  losartan 50 mg oral tablet, 50 mg= 1 tab(s), Oral, Daily, 5 refills  mirtazapine 45 mg oral tablet, 45 mg= 1 tab(s), Oral, qPM  Multiple Vitamins with Minerals oral tablet, chewable, 1 tab(s), Oral, qPM, 5 refills  omeprazole 20 mg oral DR capsule, 40 mg= 2 cap(s), Oral, Daily, 5 refills  oxybutynin 5 mg oral tablet, 5 mg= 1 tab(s), Oral, BID,? ?Not taking  pravastatin 80 mg oral tablet, 80 mg= 1 tab(s), Oral, Daily  pregabalin 150 mg oral capsule, 150 mg= 1 cap(s),  Oral, BID  tamsulosin 0.4 mg oral capsule, 0.4 mg= 1 cap(s), Oral, Daily, 5 refills  Trulicity Pen 1.5 mg/0.5 mL subcutaneous solution, See Instructions  Ventolin HFA 90 mcg/inh inhalation aerosol, 180 mcg= 2 puff(s), INH, q6hr, 5 refills  voriconazole 200 mg oral tablet, 200 mg= 1 tab(s), Oral, Daily  Allergies  Lortab  SEROquel  Seafood  codeine?(HA - Hyperactivity, C/O: itching)  penicillins?(Itching)  traZODone  trazodone-like drugs?(.)  Social History  Abuse/Neglect  No, 12/12/2020  No, 12/11/2020  Alcohol - Denies Alcohol Use, 07/31/2013  Never, 08/04/2020  Employment/School - No Risk, 08/31/2013  Disabled, 08/04/2020  Exercise  Exercise duration: 60. Exercise frequency: 1-2 times/week. Exercise type: water exercise., 09/30/2019  Financial/Legal Situation  None, 09/22/2020  Home/Environment - No Risk, 08/31/2013  Lives with Alone. Living situation: Home with assistance. Home equipment: Wheelchair., 08/18/2016    Never in , 08/07/2020  Nutrition/Health - No Risk, 08/31/2013  Calorie restricted, Caffeine intake amount: 1 cup of coffee daily., 08/18/2016  Sexual  Gender Identity Identifies as female., 09/30/2019  Spiritual/Cultural  Jew, Yes, 09/30/2019  Substance Use - Denies Substance Abuse, 07/31/2013  Never, 08/04/2020  Tobacco - Denies Tobacco Use, 07/31/2013  Never (less than 100 in lifetime), N/A, 12/12/2020  Never (less than 100 in lifetime), N/A, 12/12/2020  Never (less than 100 in lifetime), No, 12/11/2020  Family History  Acute myocardial infarction.: Sister.  CAD (coronary artery disease)....: Mother and Father.  Diabetes mellitus type 2: Brother.  Esophageal cancer....: Mother.  Immunizations  Vaccine Date Status   zoster vaccine, inactivated 10/20/2020 Given   influenza virus vaccine, inactivated 10/20/2020 Given   pneumococcal 23-polyvalent vaccine 09/30/2019 Given   influenza virus vaccine, inactivated 09/30/2019 Given   tetanus/diphtheria/pertussis, acel(Tdap) 02/28/2016  Given   Lab Results  Reviewed  Diagnostic Results  Reviewed

## 2022-09-13 NOTE — HISTORICAL [facility] CERNER
This is a historical note converted from Tayla. Formatting and pictures may have been removed.  Please reference Tayla for original formatting and attached multimedia. ?  ?  [facility]-I Transition of Care Assessment Note  ?   Admission Diagnosis:?CoVid pneumonitis  ?   Patient Goals:?monitor oxygenation, ventimask better than vapotherm, repeat daily labs. Continue CoVid treatment regimen as per ID recommendations.  ?   Code status:?Full code  ?  I have received checkout from Dr. Elías Valenzuela??regarding this patient. I agree with the above assessment and plan of care. Please call NIMESH Sylvester-II at [phone] from ?if any issues arise.?  ?  ?  Marilyn Mcnair MD  [facility]-I?   HO-2 addendum:  ?  I have received checkout from  regarding this patient. I agree with the above assessment and plan of care. Please call me from 4PM to 7AM if any issues arise:?[phone]  ?   Kenyetta Yañez MD  [facility], PGY-2

## 2022-09-13 NOTE — HISTORICAL [facility] CERNER
This is a historical note converted from Tayla. Formatting and pictures may have been removed.  Please reference Tayla for original formatting and attached multimedia. Chief Complaint  continuous cough s/p flu ?treatment 11/07/19  History of Present Illness  Pt is a?59 Years?Female?here today for cough??x?2 weeks.??Acknowledges?Cough,?non productive. ?Denies?runny nose.?Denies?nasal congestion.??Denies?fever.??Denies?body aches.? ?Denies?sinus pressure.???Acknowledges?headaches.??Denies?SOB.?Denies?wheezing.?  Dx with flu on 11/7, started on tamiflu, tessalon perles which she states isnt helping, claritin and singulair. States that she is no longer having body aches and the sinus pressure has improved. Requesting CXR today.  - smoker.  PCP?Angelo NP, Teressa Gay  ?  ?  Review of Systems  Constitutional: negative except as stated in HPI  Eye: negative except as stated in HPI  ENT: negative except as stated in HPI  Respiratory: negative except as stated in HPI  Cardiovascular: negative except as stated in HPI  Gastrointestinal: negative except as stated in HPI  Genitourinary: negative except as stated in HPI  Hema/Lymph: negative except as stated in HPI  Endocrine: negative except as stated in HPI  Immunologic: negative except as stated in HPI  Musculoskeletal: negative except as stated in HPI  Integumentary: negative except as stated in HPI  Neurologic: negative except as stated in HPI  All Other ROS_ negative except as stated in HPI  ?  ?  Physical Exam  Vitals & Measurements  T:?36.8? ?C (Oral)? HR:?94(Peripheral)? BP:?136/86?  HT:?155?cm? WT:?98.2?kg? BMI:?40.87?  General: Alert and oriented, No acute distress.  Eye:? ?Extraocular movements are intact.  HENT: Normocephalic. TMs clear pearly, nares - boggy turbinates, oropharynx pink  Neck: Supple, Non-tender, No lymphadenopathy.  Respiratory: Lungs are clear to auscultation, Respirations are non-labored, Breath sounds are equal, Symmetrical chest wall  expansion.  Cardiovascular: Normal rate, Regular rhythm, No murmur.  Musculoskeletal: Normal range of motion.  Integumentary: Warm, Dry, Intact.  Neurologic: No focal deficits, Cranial Nerves II-XII are grossly intact.  ?  Assessment/Plan  1.?Cough?R05  ?CXR-no acute cardiopulmonary process. D/c Tessalon Perles. ?Humidifier for cough,?medication as ordered. encourage fluids,?f/u with pcp. ER precautions.  Ordered:  dextromethorphan-promethazine, 5 mL, Oral, q6hr, PRN PRN for cough, X 7 day(s), # 120 mL, 0 Refill(s), Pharmacy: [facility]  Office/Outpatient Visit Level 3 Established [id number] PC, Cough  Influenza, 11/11/19 14:51:00 CST  ?  2.?Influenza?J11.1  ?Cont tamiflu as prescribed.? ?Acetaminophen prn use as directed.  Discussed increased fluids, antipyretics, and rest. Typical natural history of disease discussed. Follow up if symptoms persist or worsen outside of discussed boundaries. ER precautions.  Ordered:  Office/Outpatient Visit Level 3 Established [id number] PC, Cough  Influenza, 11/11/19 14:51:00 CST  ?   Problem List/Past Medical History  Ongoing  Acute low back pain  ADL  ADL - activity of daily living  CAD (coronary artery disease)  Diabetes  GERD [Gastroesophageal reflux disease]  Hypertension  JAMA (iron deficiency anemia)  Interstitial pulmonary disease, unspecified  Leukopenia  Schizophrenia  Seizures  Thrombocytopenia  Ureterolithiasis  Historical  back injury from fall  Blood disorder  major depression  Mild mental retardation  Pneumonia  Procedure/Surgical History  Repair Face Skin, External Approach (10/11/2019)  Simple repair of superficial wounds of face, ears, eyelids, nose, lips and/or mucous membranes; 2.6 cm to 5.0 cm (10/11/2019)  Repair Left Lower Leg Subcutaneous Tissue and Fascia, Open Approach (02/21/2018)  Simple repair of superficial wounds of scalp, neck, axillae, external genitalia, trunk and/or extremities (including hands and feet); 2.5 cm or less (02/21/2018)  Biopsy  Gastrointestinal (07/19/2017)  Esophagogastroduodenoscopy (07/19/2017)  Esophagogastroduodenoscopy, flexible, transoral; with biopsy, single or multiple (07/19/2017)  Excision of Stomach, Pylorus, Via Natural or Artificial Opening Endoscopic, Diagnostic (07/19/2017)  Colonoscopy (07/05/2017)  COLORECTAL CANCER SCREENING; COLONOSCOPY ON INDIVIDUAL NOT MEETING CRITERIA FOR HIGH RISK (07/05/2017)  Inspection of Lower Intestinal Tract, Via Natural or Artificial Opening Endoscopic (07/05/2017)  Repair Left Hand Skin, External Approach (10/05/2016)  Simple repair of superficial wounds of scalp, neck, axillae, external genitalia, trunk and/or extremities (including hands and feet); 2.5 cm or less (10/05/2016)  Repair Left Hand Skin, External Approach (02/28/2016)  Simple repair of superficial wounds of scalp, neck, axillae, external genitalia, trunk and/or extremities (including hands and feet); 2.5 cm or less (02/28/2016)  Biopsy Bone Marrow Aspiration (.) (02/23/2016)  BONE MARROW ASPIRATION PERFORMED WITH BONE MARROW BIOPSY THROUGH THE SAME INCISION ON THE SAME DATE OF SERVICE (02/23/2016)  Bone marrow; biopsy, needle or trocar (02/23/2016)  Drainage of Bone Marrow, Percutaneous Approach, Diagnostic (02/23/2016)  Extraction of Iliac Bone Marrow, Percutaneous Approach, Diagnostic (02/23/2016)  Drainage of Abdomen Skin, External Approach (02/11/2016)  Drained boil on abdomen (02/11/2016)  Incision and drainage of abscess (eg, carbuncle, suppurative hidradenitis, cutaneous or subcutaneous abscess, cyst, furuncle, or paronychia); complicated or multiple (02/11/2016)  Arteriography of cerebral arteries (09/12/2014)  Excision of intervertebral disc (02/21/2014)  Laminectomy Lumbar MIS (02/21/2014)  Other exploration and decompression of spinal canal (02/21/2014)  Central Venous Catheter Placement with Guidance (09/01/2013)  Insertion of indwelling urinary catheter (07/31/2013)  H/O:  Hysterectomy  appendectomy  Appendectomy  compressed disc  hysterectomy  right foot surgery due to fracture   Medications  acetaminophen-oxycodone 325 mg-5 mg oral tablet, 1 tab(s), Oral, BID  aspirin 81 mg oral tablet, chewable, 81 mg= 1 tab(s), Oral, Daily  betamethasone dipropionate, augmented 0.05% topical lotion, 1 cheng, TOP, BID  Breo Ellipta 100 mcg-25 mcg/inh inhalation powder, 1 puff(s), INH, Daily, 3 refills  busPIRone 30 mg oral tablet, 30 mg= 1 tab(s), Oral, BID  CELECOXIB CAP 200MG, 200 mg= 1 cap(s), Oral, Daily  Claritin 10 mg oral tablet, 10 mg= 1 tab(s), Oral, Daily  Coreg 6.25 mg oral tablet, 6.25 mg= 1 tab(s), Oral, BID, 3 refills  doxepin 100 mg oral capsule, 100 mg= 1 cap(s), Oral, Once a day (at bedtime)  DULoxetine 60 mg oral delayed release capsule, 60 mg= 1 cap(s), Oral, Daily  fenofibrate 134 mg oral capsule, 134 mg= 1 cap(s), Oral, Daily, 3 refills  ferrous gluconate 324 mg (38 mg elemental iron) oral tablet, Oral, Daily  fluticasone 50 mcg/inh nasal spray, 1 spray(s), Nasal, Daily, 3 refills  Invega 9 mg oral tablet, extended release, 9 mg= 1 tab(s), Oral, Daily  Janumet  mg-1000 mg oral tablet, extended release, 1 tab(s), Oral, qPM, 3 refills  Jardiance 25 mg oral tablet, 25 mg= 1 tab(s), Oral, Daily, 3 refills  levetiracetam 500 mg oral tablet, 500 mg= 1 tab(s), Oral, BID, 11 refills  levocetirizine 5 mg oral tablet, 5 mg= 1 tab(s), Oral, Daily  Linzess 290 mcg oral capsule, 290 mcg= 1 cap(s), Oral, Daily, 2 refills  LOSARTAN POT TAB 25MG, 25 mg= 1 tab(s), Oral, Daily  Marcaine HCl 0.25% injectable solution, 20 mL, IM, Once-NOW  montelukast 10 mg oral TABLET, 10 mg= 1 tab(s), Oral, qPM, 11 refills  niacin 500 mg oral capsule, extended release, 500 mg= 1 cap(s), Oral, Once a day (at bedtime), 1 refills  omeprazole 20 mg oral DR capsule, 40 mg= 2 cap(s), Oral, Daily, 3 refills  oxybutynin 5 mg oral tablet, 10 mg= 2 tab(s), Oral, Daily, 2 refills  pravastatin 80 mg oral tablet, 80 mg=  1 tab(s), Oral, Daily, 3 refills  Promethazine DM 6.25 mg-15 mg/5 mL oral syrup, 5 mL, Oral, q6hr, PRN  Promethazine DM 6.25 mg-15 mg/5 mL oral syrup, 5 mL, Oral, q6hr, PRN,? ?Not Taking, Completed Rx  Restasis MultiDose 0.05% ophthalmic emulsion, 1 drop(s), Eye-Both, q12hr  Tamiflu 75 mg oral capsule, 75 mg= 1 cap(s), Oral, BID  tamsulosin 0.4 mg oral capsule, 0.4 mg= 1 cap(s), Oral, Daily, 2 refills  Therapeutic Multiple Vitamins with Minerals oral tablet, 1 tab(s), Oral, qPM  Trulicity Pen 1.5 mg/0.5 mL subcutaneous solution, 1.5 mg= 0.5 mL, Subcutaneous, qWeek, 3 refills  Ventolin HFA 90 mcg/inh inhalation aerosol, 2 puff(s), INH, q6hr, 3 refills  VORICONAZOLE 200 MG TABLET, 200 mg= 1 tab(s), Oral, q12hr  ZOLPIDEM TAB 10MG, 10 mg= 1 tab(s), Oral, qPM  Allergies  Lortab  SEROquel  Seafood  codeine?(HA - Hyperactivity, C/O: itching)  penicillins  traZODone  trazodone-like drugs?(.)  Social History  Abuse/Neglect  No, 11/11/2019  No, 11/07/2019  No, 11/03/2019  Alcohol - Denies Alcohol Use, 07/31/2013  Employment/School - No Risk, 08/31/2013  disabled, 08/18/2016  Exercise  Exercise duration: 60. Exercise frequency: 1-2 times/week. Exercise type: water exercise., 09/30/2019  Home/Environment - No Risk, 08/31/2013  Lives with Alone. Living situation: Home with assistance. Home equipment: Wheelchair., 08/18/2016  Nutrition/Health - No Risk, 08/31/2013  Calorie restricted, Caffeine intake amount: 1 cup of coffee daily., 08/18/2016  Sexual  Gender Identity Identifies as female., 09/30/2019  Spiritual/Cultural  [employer]ian, Yes, 09/30/2019  Substance Use - Denies Substance Abuse, 07/31/2013  Tobacco - Denies Tobacco Use, 07/31/2013  Never (less than 100 in lifetime), No, 11/11/2019  Family History  Acute myocardial infarction.: Sister.  CAD (coronary artery disease)....: Mother and Father.  Diabetes mellitus type 2: Brother.  Esophageal cancer....: Mother.  Immunizations  Vaccine Date Status   pneumococcal 23-polyvalent  vaccine 09/30/2019 Given   influenza virus vaccine, inactivated 09/30/2019 Given   tetanus/diphtheria/pertussis, acel(Tdap) 02/28/2016 Given   Health Maintenance  Health Maintenance  ???Pending?(in the next year)  ??? ??OverDue  ??? ? ? ?Coronary Artery Disease Maintenance-Antiplatelet Agent Prescribed due??and every?  ??? ? ? ?Coronary Artery Disease Maintenance-Lipid Lowering Therapy due??and every?  ??? ? ? ?Influenza Vaccine due??and every?  ??? ??Due?  ??? ? ? ?Breast Cancer Screening due??11/11/19??and every?  ??? ??Due In Future?  ??? ? ? ?Alcohol Misuse Screening not due until??01/01/20??and every 1??year(s)  ??? ? ? ?Obesity Screening not due until??01/01/20??and every 1??year(s)  ??? ? ? ?Diabetes Maintenance-Fasting Lipid Profile not due until??01/11/20??and every 1??year(s)  ??? ? ? ?ADL Screening not due until??04/23/20??and every 1??year(s)  ??? ? ? ?Diabetes Maintenance-Eye Exam not due until??07/25/20??and every 1??year(s)  ??? ? ? ?Aspirin Therapy for CVD Prevention not due until??08/05/20??and every 1??year(s)  ??? ? ? ?Hypertension Management-BMP not due until??09/23/20??and every 1??year(s)  ??? ? ? ?Diabetes Maintenance-Foot Exam not due until??09/29/20??and every 1??year(s)  ??? ? ? ?Diabetes Maintenance-HgbA1c not due until??09/29/20??and every 1??year(s)  ??? ? ? ?Blood Pressure Screening not due until??11/10/20??and every 1??year(s)  ??? ? ? ?Body Mass Index Check not due until??11/10/20??and every 1??year(s)  ??? ? ? ?Hypertension Management-Blood Pressure not due until??11/10/20??and every 1??year(s)  ???Satisfied?(in the past 1 year)  ??? ??Satisfied?  ??? ? ? ?ADL Screening on??04/23/19.??Satisfied by Angle Vásquez LPN.  ??? ? ? ?Alcohol Misuse Screening on??09/30/19.??Satisfied by Teressa Stephens NP  ??? ? ? ?Aspirin Therapy for CVD Prevention on??08/05/19.  ??? ? ? ?Blood Pressure Screening on??11/11/19.??Satisfied by Live Burns LPN  ??? ? ? ?Body Mass Index Check  on??11/11/19.??Satisfied by Live Burns LPN  ??? ? ? ?Coronary Artery Disease Maintenance-Lipid Lowering Therapy on??09/30/19.??Satisfied by Teressa Stephens NP  ??? ? ? ?Depression Screening on??09/30/19.??Satisfied by Kat Randle LPN  ??? ? ? ?Diabetes Maintenance-Foot Exam on??09/30/19.??Satisfied by Teressa Stephens NP  ??? ? ? ?Diabetes Screening on??09/24/19.??Satisfied by Mery Su  ??? ? ? ?Hypertension Management-Blood Pressure on??11/11/19.??Satisfied by Live Burns LPN  ??? ? ? ?Influenza Vaccine on??09/30/19.??Satisfied by Kat Randle LPN  ??? ? ? ?Obesity Screening on??11/11/19.??Satisfied by Live Burns LPN  ?  Diagnostic Results  (11/11/2019 14:35 CST XR Chest 2 Views)  Reason For Exam  Cough  ?  Radiology Report  ?  CLINICAL: ?Cough.  ?  COMPARISON: December 4, 2018.  ?  FINDINGS: ?Cardiopericardial silhouette is within normal limits. Low  volume lungs are without dense focal or segmental consolidation,  congestion, pleural effusion or pneumothorax. ?  ?  IMPRESSION:  ?  NO ACUTE CARDIOPULMONARY PROCESS IDENTIFIED.?  ?  ?  Signature Line  Electronically Signed By: Benson More MD  Date/Time Signed: 11/11/2019 14:37  ?  ?  This document has an image [1]     [1]?XR Chest 2 Views; Benson More MD 11/11/2019 14:35 CST

## 2022-09-13 NOTE — HISTORICAL [facility] CERNER
This is a historical note converted from [employer]. Formatting and pictures may have been removed.  Please reference Cerblanche for original formatting and attached multimedia. Chief Complaint  Pt c/o cough, SOB that started last week. ?Pt denies fever. ?Was sent by [facility] for pneumonia. ?  Nevin [phone]  Trea Phillips [phone]  History of Present Illness  Patient is 59-year-old female past medical history of chronic leukopenia, diabetes, hypertension, chronic kidney disease who was referred for outpatient urgent care clinic after she was seen for cough, shortness of breath.? She had a chest x-ray that facility that reportedly showed infiltrates so was sent to the ER for further evaluation.? On arrival she was mildly hypoxic and has been placed on supplemental oxygen, but afebrile.? She denies myalgias or sick contacts or recent travels.? Her chest x-ray showed bilateral interstitial markings.? She was started on doxycycline and Rocephin for empiric community-acquired pneumonia treatment, and COVID-19 testing obtained.  Review of Systems  General_negative except as stated above  HEENT_negative except as stated above  Neck_negative except as stated above  Respiratory_negative except as stated above  Cardiovascular_negative except as stated above  Gastrointestinal_negative except as stated above  Genitourinary_negative except as stated above  Musculoskeletal_negative except as stated above  Immunologic_negative except as stated above  Neurologic_negative except as stated above  Psychiatric_negative except as stated above  Physical Exam  Vitals & Measurements  T:?36.6? ?C (Oral)? TMIN:?36.6? ?C (Oral)? TMAX:?36.8? ?C (Oral)? HR:?102(Peripheral)? RR:?27? BP:?129/80? SpO2:?97%? WT:?94?kg? BMI:?39.64?  GENERAL: awake, alert, oriented and in no acute distress; significant coughing at bedside  HEENT: normocephalic atraumatic?  NECK: supple?  LUNGS: CTA B/L, no rales or rhonchi, moving air well without resp  distress  CVS: Regular rate and rhythm, normal peripheral perfusion  ABD: Soft, non-tender, non-distended, bowel sounds present  EXTREMITIES: no clubbing or cyanosis  SKIN: Warm, dry.?  NEURO: alert and oriented, grossly without focal deficits?  PSYCHIATRIC: Cooperative  ?  Assessment/Plan  Acute pneumonitis/community acquired pneumonia  SIRS secondary to above  Hypoxia secondary to above  Hyperglycemia in the setting of DM2  History of chronic leukopenia with acute worsening  ?   - Continue appropriate isolation/precautions?pending COVID-19 testing results  - Continue empiric antibiotics for atypical pneumonia  - Continue supplemental oxygen as needed and wean as tolerated  - And home medications as appropriate, ISS  - discharge home when off o2  ?  DVT prophylaxis: lovenox  CODE STATUS: Full code  PCP:?[facility]   Problem List/Past Medical History  Ongoing  ADL - activity of daily living  CAD (coronary artery disease)  Diabetes  GERD [Gastroesophageal reflux disease]  Hypertension  JAMA (iron deficiency anemia)  Interstitial pulmonary disease, unspecified  Leukopenia  Schizophrenia  Seizures  Thrombocytopenia  Ureterolithiasis  Historical  back injury from fall  Blood disorder  major depression  Mild mental retardation  Pneumonia  Procedure/Surgical History  Repair Face Skin, External Approach (10/11/2019)  Simple repair of superficial wounds of face, ears, eyelids, nose, lips and/or mucous membranes; 2.6 cm to 5.0 cm (10/11/2019)  Repair Left Lower Leg Subcutaneous Tissue and Fascia, Open Approach (02/21/2018)  Simple repair of superficial wounds of scalp, neck, axillae, external genitalia, trunk and/or extremities (including hands and feet); 2.5 cm or less (02/21/2018)  Biopsy Gastrointestinal (07/19/2017)  Esophagogastroduodenoscopy (07/19/2017)  Esophagogastroduodenoscopy, flexible, transoral; with biopsy, single or multiple (07/19/2017)  Excision of Stomach, Pylorus, Via Natural or Artificial Opening  Endoscopic, Diagnostic (07/19/2017)  Colonoscopy (07/05/2017)  COLORECTAL CANCER SCREENING; COLONOSCOPY ON INDIVIDUAL NOT MEETING CRITERIA FOR HIGH RISK (07/05/2017)  Inspection of Lower Intestinal Tract, Via Natural or Artificial Opening Endoscopic (07/05/2017)  Repair Left Hand Skin, External Approach (10/05/2016)  Simple repair of superficial wounds of scalp, neck, axillae, external genitalia, trunk and/or extremities (including hands and feet); 2.5 cm or less (10/05/2016)  Repair Left Hand Skin, External Approach (02/28/2016)  Simple repair of superficial wounds of scalp, neck, axillae, external genitalia, trunk and/or extremities (including hands and feet); 2.5 cm or less (02/28/2016)  Biopsy Bone Marrow Aspiration (.) (02/23/2016)  BONE MARROW ASPIRATION PERFORMED WITH BONE MARROW BIOPSY THROUGH THE SAME INCISION ON THE SAME DATE OF SERVICE (02/23/2016)  Bone marrow; biopsy, needle or trocar (02/23/2016)  Drainage of Bone Marrow, Percutaneous Approach, Diagnostic (02/23/2016)  Extraction of Iliac Bone Marrow, Percutaneous Approach, Diagnostic (02/23/2016)  Drainage of Abdomen Skin, External Approach (02/11/2016)  Drained boil on abdomen (02/11/2016)  Incision and drainage of abscess (eg, carbuncle, suppurative hidradenitis, cutaneous or subcutaneous abscess, cyst, furuncle, or paronychia); complicated or multiple (02/11/2016)  Arteriography of cerebral arteries (09/12/2014)  Excision of intervertebral disc (02/21/2014)  Laminectomy Lumbar MIS (02/21/2014)  Other exploration and decompression of spinal canal (02/21/2014)  Central Venous Catheter Placement with Guidance (09/01/2013)  Insertion of indwelling urinary catheter (07/31/2013)  H/O: Hysterectomy  appendectomy  Appendectomy  compressed disc  hysterectomy  right foot surgery due to fracture   Medications  Inpatient  acetaminophen, 650 mg= 20.3 mL, Oral, q6hr, PRN  acetaminophen, 1000 mg= 2 tab(s), Oral, q6hr, PRN  Dextrose 10% in Water intravenous  solution, 125 mL, IV, As Directed, PRN  Dextrose 10% in Water intravenous solution, 125 mL, IV, As Directed, PRN  Dextrose 10% in Water intravenous solution, 125 mL, IV, Once, PRN  Dextrose 10% in Water intravenous solution, 250 mL, IV, As Directed, PRN  glucagon, 1 mg= 1 EA, IM, q10min, PRN  glucagon, 1 mg= 1 EA, IM, q10min, PRN  insulin lispro, 2-14 units, Subcutaneous, As Directed, PRN  Marcaine HCl 0.25% injectable solution, 20 mL, IM, Once-NOW  Robitussin-AC, 10 mL, Oral, q6hr, PRN  Sodium Chloride 0.9% intravenous solution 1,000 mL, 1000 mL, IV  Tessalon Perles, 200 mg= 2 cap(s), Oral, TID, PRN  Zofran, 4 mg= 2 mL, IV Push, q4hr, PRN  Home  aspirin 81 mg oral tablet, chewable, 81 mg= 1 tab(s), Oral, Daily, 1 refills,? ?Investigating  Astelin 137 mcg/inh nasal spray, 1 spray(s), Nasal, BID,? ?Investigating  baclofen 10 mg oral tablet, 10 mg= 1 tab(s), Oral, TID,? ?Investigating  Bactroban 2% Ointment (22.5 gmTube), See Instructions, 1 refills,? ?Investigating  benzonatate 200 mg oral capsule, 200 mg= 1 cap(s), Oral, TID, PRN, 1 refills,? ?Investigating  benzonatate 200 mg oral capsule, 200 mg= 1 cap(s), Oral, TID, PRN  betamethasone dipropionate, augmented 0.05% topical lotion, 1 cheng, TOP, BID,? ?Investigating  Breo Ellipta 100 mcg-25 mcg/inh inhalation powder, 1 puff(s), INH, Daily, 3 refills,? ?Investigating  busPIRone 30 mg oral tablet, 30 mg= 1 tab(s), Oral, BID, 1 refills,? ?Investigating  celecoxib 200 mg oral capsule, 200 mg= 1 cap(s), Oral, Daily, 1 refills,? ?Investigating  Claritin 10 mg oral tablet, 10 mg= 1 tab(s), Oral, Daily, 2 refills,? ?Investigating  Coreg 6.25 mg oral tablet, 6.25 mg= 1 tab(s), Oral, BID, 1 refills,? ?Investigating  doxepin 100 mg oral capsule, 100 mg= 1 cap(s), Oral, Once a day (at bedtime),? ?Investigating  fenofibrate 134 mg oral capsule, 134 mg= 1 cap(s), Oral, Daily, 1 refills,? ?Investigating  ferrous gluconate 324 mg (38 mg elemental iron) oral tablet, 324 mg= 1 tab(s),  Oral, Daily, 1 refills,? ?Investigating  fexofenadine 180 mg oral tablet, 180 mg= 1 tab(s), Oral, Daily  Invega 6 mg oral tablet, extended release, 6 mg= 1 tab(s), Oral, qAM, 1 refills,? ?Investigating  Janumet  mg-1000 mg oral tablet, extended release, 1 tab(s), Oral, qPM, 3 refills,? ?Investigating  Jardiance 25 mg oral tablet, 25 mg= 1 tab(s), Oral, Daily, 3 refills,? ?Investigating  levetiracetam 500 mg oral tablet, 500 mg= 1 tab(s), Oral, BID, 11 refills,? ?Investigating  levocetirizine 5 mg oral tablet, 5 mg= 1 tab(s), Oral, Daily, 2 refills,? ?Investigating  Linzess 290 mcg oral capsule, 290 mcg= 1 cap(s), Oral, Daily, 1 refills,? ?Investigating  losartan 25 mg oral tablet, 25 mg= 1 tab(s), Oral, Daily,? ?Investigating  Lyrica 150 mg oral capsule, 150 mg= 1 cap(s), Oral, BID,? ?Investigating  meloxicam 7.5 mg oral tablet, 7.5 mg= 1 tab(s), Oral, Daily, PRN,? ?Investigating  metoclopramide 5 mg oral tablet, 5 mg= 1 tab(s), Oral, QIDACHS,? ?Investigating  mirtazapine 15 mg oral tablet, 15 mg= 1 tab(s), Oral, Once a day (at bedtime), 1 refills,? ?Investigating  montelukast 10 mg oral TABLET, 10 mg= 1 tab(s), Oral, qPM, 11 refills,? ?Investigating  niacin 500 mg oral tablet, 1000 mg= 2 tab(s), Oral, TID, 4 refills,? ?Investigating  omeprazole 20 mg oral DR capsule, 40 mg= 2 cap(s), Oral, Daily, 1 refills,? ?Investigating  oxybutynin 5 mg oral tablet, 10 mg= 2 tab(s), Oral, Daily, 1 refills,? ?Investigating  pravastatin 80 mg oral tablet, 80 mg= 1 tab(s), Oral, Daily, 3 refills,? ?Investigating  Promethazine DM 6.25 mg-15 mg/5 mL oral syrup, 5 mL, Oral, q6hr, PRN,? ?Investigating  tamsulosin 0.4 mg oral capsule, 0.4 mg= 1 cap(s), Oral, Daily, 1 refills,? ?Investigating  Therapeutic Multiple Vitamins with Minerals oral tablet, 1 tab(s), Oral, qPM,? ?Investigating  Trulicity Pen 1.5 mg/0.5 mL subcutaneous solution, 1.5 mg= 0.5 mL, Subcutaneous, qWeek, 3 refills,? ?Investigating  Tylenol Caplet 500 mg oral  tablet, 1000 mg= 2 tab(s), Oral, q6hr,? ?Investigating  Ventolin HFA 90 mcg/inh inhalation aerosol, 2 puff(s), INH, q6hr, 3 refills,? ?Investigating  voriconazole 200 mg oral tablet, 200 mg= 1 tab(s), Oral, q12hr,? ?Investigating  ZOLPIDEM TAB 10MG, 10 mg= 1 tab(s), Oral, qPM,? ?Investigating  Allergies  Lortab  SEROquel  Seafood  codeine?(HA - Hyperactivity, C/O: itching)  penicillins  traZODone  trazodone-like drugs?(.)  Social History  Abuse/Neglect  No, 03/21/2020  No, No, Yes, 03/16/2020  No, 03/13/2020  No, 02/17/2020  Alcohol - Denies Alcohol Use, 07/31/2013  Never, 02/05/2020  Employment/School - No Risk, 08/31/2013  disabled, 08/18/2016  Exercise  Exercise duration: 60. Exercise frequency: 1-2 times/week. Exercise type: water exercise., 09/30/2019  Home/Environment - No Risk, 08/31/2013  Lives with Alone. Living situation: Home with assistance. Home equipment: Wheelchair., 08/18/2016  Nutrition/Health - No Risk, 08/31/2013  Calorie restricted, Caffeine intake amount: 1 cup of coffee daily., 08/18/2016  Sexual  Gender Identity Identifies as female., 09/30/2019  Spiritual/Cultural  Holiness, Yes, 09/30/2019  Substance Use - Denies Substance Abuse, 07/31/2013  Never, 02/05/2020  Tobacco - Denies Tobacco Use, 07/31/2013  Never (less than 100 in lifetime), No, 03/21/2020  Never (less than 100 in lifetime), No, 03/16/2020  Never (less than 100 in lifetime), No, 03/13/2020  Never (less than 100 in lifetime), No, Household tobacco concerns: No., 02/17/2020  Family History  Acute myocardial infarction.: Sister.  CAD (coronary artery disease)....: Mother and Father.  Diabetes mellitus type 2: Brother.  Esophageal cancer....: Mother.  Immunizations  Vaccine Date Status   pneumococcal 23-polyvalent vaccine 09/30/2019 Given   influenza virus vaccine, inactivated 09/30/2019 Given   tetanus/diphtheria/pertussis, acel(Tdap) 02/28/2016 Given   Lab Results  Chemistry? Hematology/Coagulation?   Sodium Lvl: 140 mmol/L  (03/21/20 18:38:00) WBC:?1.7 x10(3)/mcL?Critical (03/21/20 18:38:00)   Potassium Lvl: 4.1 mmol/L (03/21/20 18:38:00) RBC:?4.09 x10(6)/mcL?Low (03/21/20 18:38:00)   Chloride: 107 mmol/L (03/21/20 18:38:00) Hgb: 12 gm/dL (03/21/20 18:38:00)   CO2: 26 mmol/L (03/21/20 18:38:00) Hct:?36.8 %?Low (03/21/20 18:38:00)   Calcium Lvl: 9 mg/dL (03/21/20 18:38:00) Platelet:?59 x10(3)/mcL?Low (03/21/20 18:38:00)   Glucose Lvl:?374 mg/dL?High (03/21/20 18:38:00) MCV: 90 fL (03/21/20 18:38:00)   BUN:?23 mg/dL?High (03/21/20 18:38:00) MCH: 29.3 pg (03/21/20 18:38:00)   Creatinine: 0.9 mg/dL (03/21/20 18:38:00) MCHC:?32.6 gm/dL?Low (03/21/20 18:38:00)   eGFR-AA: >60 (03/21/20 18:38:00) RDW: 12.8 % (03/21/20 18:38:00)   eGFR-JAMILA: >60 (03/21/20 18:38:00) MPV: 10.9 fL (03/21/20 18:38:00)   Bili Total: 0.3 mg/dL (03/21/20 18:38:00) Abs Neut:?0.91 x10(3)/mcL?Low (03/21/20 18:38:00)   Bili Direct: 0.1 mg/dL (03/21/20 18:38:00) Neut Man: 66 % (03/21/20 18:38:00)   Bili Indirect: 0.2 mg/dL (03/21/20 18:38:00) Lymph Man: 22 % (03/21/20 18:38:00)   AST:?40 unit/L?High (03/21/20 18:38:00) Monocyte Man: 3 % (03/21/20 18:38:00)   ALT: 39 unit/L (03/21/20 18:38:00) Eos Man: 8 % (03/21/20 18:38:00)   Alk Phos:?140 unit/L?High (03/21/20 18:38:00) Basophil Man: 1 % (03/21/20 18:38:00)   Total Protein: 7 gm/dL (03/21/20 18:38:00) Platelet Est: Decreased (03/21/20 18:38:00)   Albumin Lvl:?3.3 gm/dL?Low (03/21/20 18:38:00) Anisocyte: Slight Abnormal (03/21/20 18:38:00)   Globulin:?3.7 gm/dL?High (03/21/20 18:38:00) RBC Morph: Abnormal (03/21/20 18:38:00)   A/G Ratio:?0.9 ratio?Low (03/21/20 18:38:00)    POC BNP iSTAT: 76 pg/mL (03/21/20 18:48:00)    Lactic Acid Lvl: 1 mmol/L (03/21/20 19:22:00)    POC Troponin: 0.02 ng/mL (03/21/20 18:49:00)    ?      Patient is having someone bring her accurate list of her home medications in the morning

## 2022-09-13 NOTE — HISTORICAL [facility] CERN[facility]
This is a historical note converted from [employer]. Formatting and pictures may have been removed.  Please reference Cerblanche for original formatting and attached multimedia. Chief Complaint  Pt family here state pt c/o R arm weakness/dullness since 1400 today, pt took nap around 0900 this am and woke up w/ arm numbness/weakness. denies headache/blurred vision/dizziness. hx of DM, HTN, CVA w/ no deficits in past.  Reason for Consultation  ?stroke  History of Present Illness  admitted on 7/25/17 after awakening from a nap w/ RUE swelling, weakness & numbness  went down for nap @ 9:30 & awoke @ 2:15 (normal nap time for her) and noticed symptoms upon awakening  no facial droop/drift/confusion/vision change  she presented to the ER ~1700  remains with R-hand weakness  slept on R-side?for her?(and every night due to her chronic back pain)  ?  pmh: cva, dm, cad, htn, schizophrenia, cirrhosis, chronic neutropenia/thrombocytopenia, JAMA  lives in her own apartment w/ 24hr sitters (brother lives [place] of state)  uses walker & WC at home - unable to walk long distances - spends majority of the time in WC  Review of Systems  CONSTITUTIONAL: As per HPI  EYE: No visual loss, diplopia, blurred vision  ENMT: No tinnitus, hearing loss  RESPIRATORY: No SOB, cough  CARDIOVASCULAR: No chest pain, palpitations  GASTROINTESTINAL: No nausea, vomiting, diarrhea  GENITOURINARY: No incontinence, urinary problems  MUSCULOSKELETAL: No joint pain, swelling  NEUROLOGICAL: No new weakness, numbness, tingling, dizziness or vertigo, headache, balance problems  PSYCHOLOGICAL: Negative except for those listed in HPI?  ENDOCRINE: Negative except for those listed in HPI  ALL OTHER ROS: Reviewed and negative.  Physical Exam  Vitals & Measurements  T:?36.6? ?C ?(Oral)? HR:?79?(Monitored)? BP:?137/86? SpO2:?93%? WT:?101.5?kg?  GENERAL:?NAD, calm, cooperative, appropriate  RESP: CTAB  HEART: RRR w/o murmur  no LE edema  MENTAL STATUS: Oriented x4, follows  commands reliably  SPEECH/LANGUAGE: Clear, coherent  CN:  No tactile or motor facial asymmetry  t/p midline  Motor:?unable to dorsiflex the R-wrist or give thumbs up  Cerebellar: No?tremor or dysmetria  Sensory: decreased tactile sensation to dorsal R-forearm  Gait:?not observed  Assessment/Plan  R-radial nerve palsy vs. ?cva  TIA in 2014  htn  cirrhosis  dm  neutropenia/thrombocytopenia, chronic  JAMA  ?  continue ASA  will complete cva w/u  recc that she sleep in recliner  if this is a radial nerve palsy, things should improve in the coming weeks to months  await imaging  ?  brother Ash lives?out of state [phone]  further to follow from dr. burks   Problem List/Past Medical History  Acute low back pain  ADL  ADL - activity of daily living  CAD (coronary artery disease)  Diabetes  GERD [Gastroesophageal reflux disease]  Hypertension  JAMA (iron deficiency anemia)  Leukopenia  Schizophrenia  Thrombocytopenia  Ureterolithiasis  Historical  back injury from fall  Blood disorder  major depression  Mild mental retardation  Pneumonia  Procedure/Surgical History  Biopsy Gastrointestinal (07/19/2017), Esophagogastroduodenoscopy (07/19/2017), Esophagogastroduodenoscopy, flexible, transoral; with biopsy, single or multiple (07/19/2017), Excision of Stomach, Pylorus, Via Natural or Artificial Opening Endoscopic, Diagnostic (07/19/2017), Colonoscopy (07/05/2017), COLORECTAL CANCER SCREENING; COLONOSCOPY ON INDIVIDUAL NOT MEETING CRITERIA FOR HIGH RISK (07/05/2017), Inspection of Lower Intestinal Tract, Via Natural or Artificial Opening Endoscopic (07/05/2017), Repair Left Hand Skin, External Approach (10/05/2016), Simple repair of superficial wounds of scalp, neck, axillae, external genitalia, trunk and/or extremities (including hands and feet); 2.5 cm or less (10/05/2016), Repair Left Hand Skin, External Approach (02/28/2016), Simple repair of superficial wounds of scalp, neck, axillae, external genitalia, trunk  and/or extremities (including hands and feet); 2.5 cm or less (02/28/2016), Biopsy Bone Marrow Aspiration (.) (02/23/2016), BONE MARROW ASPIRATION PERFORMED WITH BONE MARROW BIOPSY THROUGH THE SAME INCISION ON THE SAME DATE OF SERVICE (02/23/2016), Bone marrow; biopsy, needle or trocar (02/23/2016), Drainage of Bone Marrow, Percutaneous Approach, Diagnostic (02/23/2016), Extraction of Iliac Bone Marrow, Percutaneous Approach, Diagnostic (02/23/2016), Drainage of Abdomen Skin, External Approach (02/11/2016), Drained boil on abdomen (02/11/2016), Incision and drainage of abscess (eg, carbuncle, suppurative hidradenitis, cutaneous or subcutaneous abscess, cyst, furuncle, or paronychia); complicated or multiple (02/11/2016), Arteriography of cerebral arteries (09/12/2014), Excision of intervertebral disc (02/21/2014), Laminectomy Lumbar MIS (02/21/2014), Other exploration and decompression of spinal canal (02/21/2014), Central Venous Catheter Placement with Guidance (09/01/2013), Insertion of indwelling urinary catheter (07/31/2013), Acquired absence of both cervix and uterus, appendectomy, Appendectomy, compressed disc, hysterectomy, right foot surgery due to fracture.  Medications  Inpatient  aspirin, 325 mg, 1 tab(s), Oral, Daily  Dextrose 10% in Water, 25 gm, 250 mL, IV, As Directed, PRN  Dextrose 10% in Water, 12.5 gm, 125 mL, IV, Once, PRN  Dextrose 10% in Water, 12.5 gm, 125 mL, IV, As Directed, PRN  Dextrose 10% in Water, 12.5 gm, 125 mL, IV, As Directed, PRN  glucagon, 1 mg, 1 EA, IM, q10min, PRN  glucagon, 1 mg, 1 EA, IM, q10min, PRN  hydrALAZINE 20 mg/mL injectable solution, 10 mg, 0.5 mL, IV Push, q4hr, PRN  insulin lispro, 2-14 units, Subcutaneous, As Directed, PRN  labetalol, 10 mg, 2 mL, IV Push, q10min, PRN  Marcaine HCl 0.25% injectable solution, 20 mL, IM, Once-NOW  Percocet 10/325, 1 tab(s), Oral, q6hr, PRN  Home  Aller-Ease 180 mg oral tablet, 180 mg, 1 tab(s), Oral, Daily  alPRAzolam 0.25 mg oral  tab, 0.25 mg, 1 tab(s), Oral, TID  aspirin 81 mg oral tablet, 81 mg, 1 tab(s), Oral, Daily  Coreg 3.125 mg oral tablet, 3.125 mg, 1 tab(s), Oral, BID, 3 refills  DULoxetine, 60 mg, Oral, Daily  ferrous gluconate 324 mg oral tablet, 324 mg, 1 tab(s), Oral, Daily, 5 refills  Geodon 20 mg oral capsule, 40 mg, 2 cap(s), Oral, BID  Invega 9 mg oral tablet, extended release, 9 mg, 1 tab(s), Oral, qAM  Invokana 300 mg oral tablet, 300 mg, 1 tab(s), Oral, Daily  Lexapro 20 mg oral tablet, 20 mg, 1 tab(s), Oral, Daily  Linzess 290 mcg oral capsule, 290 mcg, 1 cap(s), Oral, Daily  LINZESS CAP 290MCG, 290 mcg, 1 cap(s), Oral, Daily  Lyrica 75 mg oral capsule, 150 mg, Oral, BID  metFORMIN 1000 mg oral tablet, 1000 mg, 1 tab(s), Oral, BID  oxybutynin 10 mg oral tablet, extended release, 1 tab, Oral, BID  oxybutynin 5 mg oral tablet, 10 mg, 2 tab(s), Oral, Daily  Pantoprazole 40 mg ORAL EC-Tablet, 40 mg, Oral, BID  Reglan 5 mg oral tablet, 5 mg, 1 tab(s), Oral, Daily  tamsulosin 0.4 mg oral capsule, 0.4 mg, 1 cap(s), Oral, Daily  terbinafine 250 mg oral tablet, 250 mg, 1 tab(s), Oral, Daily  Therems M oral tablet, 1 tab(s), Oral, At Bedtime  Victoza 18 mg/3 mL subcutaneous injection, 1.8 mg, Subcutaneous, Daily  Vimpat 100 mg oral tablet, 100 mg, 1 tab(s), Oral, BID  Allergies  Lortab  SEROquel  Seafood  codeine?(C/O: itching, HA - Hyperactivity)  penicillins  traZODone  Social History  Alcohol - Denies Alcohol Use  Never  Employment/School - No Risk  disabled  Home/Environment - No Risk  Lives with Alone. Living situation: Home with assistance. Home equipment: Wheelchair.  Nutrition/Health - No Risk  Calorie restricted, Caffeine intake amount: 1 cup of coffee daily.  Substance Abuse - Denies Substance Abuse  Never  Tobacco - Denies Tobacco Use  Never smoker  Family History  Acute myocardial infarction.: Sister.  CAD (coronary artery disease)....: Mother and Father.  Diabetes mellitus type 2: Brother.  Esophageal cancer....:  Mother.  Lab Results  Sodium Lvl 142 mmol/L 07/25/2017 22:24 CDT  Potassium Lvl 3.9 mmol/L 07/25/2017 22:24 CDT  Chloride 105 mmol/L 07/25/2017 22:24 CDT  CO2 25.0 mmol/L 07/25/2017 22:24 CDT  Calcium Lvl 8.9 mg/dL 07/25/2017 22:24 CDT  Glucose Lvl 124 mg/dL 07/25/2017 22:24 CDT (High)  BUN 23.0 mg/dL 07/25/2017 22:24 CDT (High)  Creatinine 0.98 mg/dL 07/25/2017 22:24 CDT  Hgb A1c 6.4 % 07/25/2017 22:24 CDT (High)  CRP High Sens 14.50 mg/L 07/25/2017 22:24 CDT (High)  Chol 320 mg/dL 07/25/2017 22:24 CDT (High)  HDL 31 mg/dL 07/25/2017 22:24 CDT (Low)  Trig 892 mg/dL 07/25/2017 22:24 CDT (High)   mg/dL 07/25/2017 22:24 CDT  Total CK 88 unit/L 07/25/2017 22:24 CDT  WBC 2.0 x10(3)/mcL 07/25/2017 22:24 CDT (Low)  RBC 3.97 x10(6)/mcL 07/25/2017 22:24 CDT (Low)  Hgb 11.9 gm/dL 07/25/2017 22:24 CDT (Low)  Hct 35.5 % 07/25/2017 22:24 CDT (Low)  Platelet 48 x10(3)/mcL 07/25/2017 22:24 CDT (Low)  Sed Rate 19 mm/hr 07/25/2017 22:24 CDT  Diagnostic Results  cth -  No acute cortical infarct, hemorrhage or abnormal extra-axial fluid collection identified. No midline shift, mass lesion or focal mass effect noted. Ventricles are normal in size.? Visualized paranasal sinuses and mastoid air cells are clear.  Impression: No acute findings.? [1]  ?  ?  PRIOR MRI BRAIN 9/12/2014  NORMAL MRI BRAIN WITHOUT CONTRAST.? [2]     [1]?CT Head W/O Contrast; Talisha Clarke 07/25/2017 18:52 CDT  [2]?MRI Brain W/O Contrast; Young Holcomb 09/12/2014 13:36 CDT   pt seen and examined in conjunction with NP  imaging report reviewed. images also independently reviewed. agree w report of Ct scan.  Counseling and/or coordination of care consumes >50% of the encounter.?  time is?greater than?40 minutes  Items discussed include differential diagnoses.  Potential for additional testing, treatment options, and prognosis also discussed.  Assessment and Plan as above  herson brother allyssa via phone just now